# Patient Record
Sex: MALE | Race: WHITE | HISPANIC OR LATINO | Employment: OTHER | URBAN - METROPOLITAN AREA
[De-identification: names, ages, dates, MRNs, and addresses within clinical notes are randomized per-mention and may not be internally consistent; named-entity substitution may affect disease eponyms.]

---

## 2017-02-22 ENCOUNTER — PATIENT MESSAGE (OUTPATIENT)
Dept: INTERNAL MEDICINE | Facility: CLINIC | Age: 77
End: 2017-02-22

## 2017-02-24 ENCOUNTER — OFFICE VISIT (OUTPATIENT)
Dept: INTERNAL MEDICINE | Facility: CLINIC | Age: 77
End: 2017-02-24
Payer: MEDICARE

## 2017-02-24 VITALS
BODY MASS INDEX: 25.25 KG/M2 | DIASTOLIC BLOOD PRESSURE: 90 MMHG | SYSTOLIC BLOOD PRESSURE: 179 MMHG | HEIGHT: 64 IN | WEIGHT: 147.94 LBS | HEART RATE: 81 BPM

## 2017-02-24 DIAGNOSIS — B36.0 TINEA VERSICOLOR: Primary | ICD-10-CM

## 2017-02-24 DIAGNOSIS — R21 RASH: ICD-10-CM

## 2017-02-24 DIAGNOSIS — I10 ESSENTIAL HYPERTENSION: ICD-10-CM

## 2017-02-24 PROCEDURE — 1157F ADVNC CARE PLAN IN RCRD: CPT | Mod: S$GLB,,, | Performed by: INTERNAL MEDICINE

## 2017-02-24 PROCEDURE — 3080F DIAST BP >= 90 MM HG: CPT | Mod: S$GLB,,, | Performed by: INTERNAL MEDICINE

## 2017-02-24 PROCEDURE — 99213 OFFICE O/P EST LOW 20 MIN: CPT | Mod: S$GLB,,, | Performed by: INTERNAL MEDICINE

## 2017-02-24 PROCEDURE — 99999 PR PBB SHADOW E&M-EST. PATIENT-LVL III: CPT | Mod: PBBFAC,,, | Performed by: INTERNAL MEDICINE

## 2017-02-24 PROCEDURE — 1159F MED LIST DOCD IN RCRD: CPT | Mod: S$GLB,,, | Performed by: INTERNAL MEDICINE

## 2017-02-24 PROCEDURE — 99499 UNLISTED E&M SERVICE: CPT | Mod: S$GLB,,, | Performed by: INTERNAL MEDICINE

## 2017-02-24 PROCEDURE — 3077F SYST BP >= 140 MM HG: CPT | Mod: S$GLB,,, | Performed by: INTERNAL MEDICINE

## 2017-02-24 PROCEDURE — 1160F RVW MEDS BY RX/DR IN RCRD: CPT | Mod: S$GLB,,, | Performed by: INTERNAL MEDICINE

## 2017-02-24 PROCEDURE — 1126F AMNT PAIN NOTED NONE PRSNT: CPT | Mod: S$GLB,,, | Performed by: INTERNAL MEDICINE

## 2017-02-24 RX ORDER — KETOCONAZOLE 20 MG/ML
SHAMPOO, SUSPENSION TOPICAL
Qty: 120 ML | Refills: 0 | Status: SHIPPED | OUTPATIENT
Start: 2017-02-27 | End: 2017-05-27

## 2017-02-24 RX ORDER — KETOCONAZOLE 20 MG/G
CREAM TOPICAL DAILY
Qty: 60 G | Refills: 0 | Status: SHIPPED | OUTPATIENT
Start: 2017-02-24 | End: 2017-03-10

## 2017-02-24 NOTE — MR AVS SNAPSHOT
[facility]  [address]  [place] [address]  Phone: [phone]  Fax: [phone]                  Taran Hernandes   2017 2:20 PM   Office Visit    Description:  Male : 1940   Provider:  Malissa King MD   Department:  [facility] Internal Medicine           Reason for Visit     Rash           Diagnoses this Visit        Comments    Tinea versicolor    -  Primary     Rash         Essential hypertension                To Do List           Goals (5 Years of Data)     None      Follow-Up and Disposition     Return if symptoms worsen or fail to improve.       These Medications        Disp Refills Start End    ketoconazole (NIZORAL) 2 % shampoo 120 mL 0 2017     Apply topically twice a week. - Topical (Top)    Pharmacy: [facility] [address] Ph #: [phone]       ketoconazole (NIZORAL) 2 % cream 60 g 0 2017 3/10/2017    Apply topically once daily. - Topical (Top)    Pharmacy: [facility] [address] Ph #: [phone]         [facility]     [facility] On Call Nurse [facility] -  Assistance  Registered nurses in the [facility] provide clinical advisement, health education, appointment booking, and other advisory services.  Call for this free service at [phone].             Medications           Message regarding Medications     Verify the changes and/or additions to your medication regime listed below are the same as discussed with your clinician today.  If any of these changes or additions are incorrect, please notify your healthcare provider.        START taking these NEW medications        Refills    ketoconazole (NIZORAL) 2 % shampoo 0    Starting on: 2017    Sig: Apply topically twice a week.    Class: Normal    Route: Topical (Top)    ketoconazole (NIZORAL) 2  % cream 0    Sig: Apply topically once daily.    Class: Normal    Route: Topical (Top)      STOP taking these medications     ibuprofen (ADVIL) 200 MG tablet Take 200 mg by mouth every 6 (six) hours as needed for Pain.           Verify that the below list of medications is an accurate representation of the medications you are currently taking.  If none reported, the list may be blank. If incorrect, please contact your healthcare provider. Carry this list with you in case of emergency.           Current Medications     amlodipine (NORVASC) 10 MG tablet Take 1 tablet (10 mg total) by mouth once daily.    aspirin (ECOTRIN) 81 MG EC tablet Take 81 mg by mouth once daily.    benazepril (LOTENSIN) 20 MG tablet Take 1 tablet (20 mg total) by mouth once daily.    hydrocodone-acetaminophen 5-325mg (NORCO) 5-325 mg per tablet Take 1 tablet by mouth every 6 (six) hours as needed.    omeprazole (PRILOSEC) 20 MG capsule Take 1 capsule (20 mg total) by mouth once daily.    tamsulosin (FLOMAX) 0.4 mg Cp24 TAKE 1 CAPSULE(0.4 MG) BY MOUTH EVERY DAY    vitamin D 185 MG Tab Take by mouth. 1 Tablet Oral Every day    ketoconazole (NIZORAL) 2 % cream Apply topically once daily.    ketoconazole (NIZORAL) 2 % shampoo Starting on Feb 27, 2017. Apply topically twice a week.           Clinical Reference Information           Your Vitals Were     BP                   179/90 (BP Location: Left arm, Patient Position: Sitting, BP Method: Automatic)           Blood Pressure          Most Recent Value    BP  (!)  179/90      Allergies as of 2/24/2017     No Known Allergies      Immunizations Administered on Date of Encounter - 2/24/2017     None      Instructions    Dear Taran Hernandes,     Thank you for choosing [facility] for your healthcare needs.    Please use the anti-fungal cream and shampoo as we discussed.     Please take your blood pressure medications as prescribed, and avoid excess salt in your diet. Please keep a log of  your blood pressures, and bring the log and your blood pressure machine with you to your next appointment.     Please schedule an appointment with your primary care doctor for follow up of your blood pressure and routine health maintenance.     It was a pleasure taking care of you, and we wish you the best health!     Sincerely,     Malissa King MD  Hospital Medicine [facility]     .  Fungal Skin Infection (Tinea)  A fungal infection is when too much fungus grows on or in the body. Fungus normally lives on the skin in small amounts and does not cause harm. But when too much grows on the skin, it causes an infection. This is also known as tinea. Fungal skin infections are common and not often serious.  The infection often starts as a small red area the size of a pea. The skin may turn dry and flaky. The area may itch. As the fungus grows, it spreads out in a red [place]. Because of how it looks, fungal skin infection is often called ringworm, but it is not caused by a worm. Fungal skin infections can occur on many parts of the body. They can grow on the head, chest, arms, or legs. They can occur on the buttocks. On the feet, fungal infection is known as athletes foot. It causes itchy, sometimes painful sores between the toes and the bottom or sides of the feet. In the groin, the rash is called jock itch.  People with weakened immune systems can get a fungal infection more easily. This includes people with diabetes or HIV, or who are being treated for cancer. In these cases, the fungal infection can spread and cause severe illness. Fungal infections are also more common in people who are obese.  In most cases, treatment is done with antifungal cream or ointment. If the infection is on your scalp, you may take oral medication. In some cases, a tiny piece of the skin (biopsy) may be taken. This is so it can be tested in a lab.  Common fungal infections are treated with creams on the skin or oral  medicine.  Home care  Follow all instructions when using antifungal cream or ointment on your skin. The health care provider may advise using cornstarch powder to keep your skin dry or petroleum jelly to provide a barrier.  General care:  · If you were prescribed an oral medicine, read the patient information. Talk with the health care provider about the risks and side effects.  · Let your skin dry completely after bathing. Carefully dry your feet and between your toes.  · Dress in loose cotton clothing.  · Dont scratch the affected area. This can delay healing and may spread the infection. It can also cause a bacterial infection.  · Keep your skin clean, but dont wash the skin too much. This can irritate your skin.  · Keep in mind that it may take a week before the fungus starts to go away. It can take 2 to 4 weeks to fully clear. To prevent it from coming back, use the medicine until the rash is all gone.  Follow-up care  Follow up with your health care provider if the rash does not get better after 10 days of treatment. Also follow up if the rash spreads to other parts of your body.  When to seek medical advice  Call your health care provider right away if any of these occur:  · Fever of 100.4°F (38°C) or higher  · Redness or swelling that gets worse  · Pain that gets worse  · Foul-smelling fluid leaking from the skin  Date Last Reviewed: 7/23/2014  © 7356-1717 [employer]. [address] [place] [address]. All rights reserved. This information is not intended as a substitute for professional medical care. Always follow your healthcare professional's instructions.        Step-by-Step  Checking Your Blood Pressure    Date Last Reviewed: 4/27/2016  © 0277-0514 [employer]. [address] [place] [address]. All rights reserved. This information is not intended as a substitute for professional medical care. Always follow your healthcare professional's  instructions.             Language Assistance Services     ATTENTION: Language assistance services are available, free of charge. Please call [phone].      ATENCIÓN: Si habla everett, tiene a cano disposición servicios gratuitos de asistencia lingüística. Llame al [phone].     CHÚ Ý: N?u b?n nói Ti?ng Vi?t, có các d?ch v? h? tr? ngôn ng? mi?n phí dành cho b?n. G?i s? [phone].         [facility] complies with applicable Federal civil rights laws and does not discriminate on the basis of race, color, national origin, age, disability, or sex.

## 2017-02-24 NOTE — PATIENT INSTRUCTIONS
Dear Taran Hernandes,     Thank you for choosing [facility] for your healthcare needs.    Please use the anti-fungal cream and shampoo as we discussed.     Please take your blood pressure medications as prescribed, and avoid excess salt in your diet. Please keep a log of your blood pressures, and bring the log and your blood pressure machine with you to your next appointment.     Please schedule an appointment with your primary care doctor for follow up of your blood pressure and routine health maintenance.     It was a pleasure taking care of you, and we wish you the best health!     Sincerely,     Malissa King MD  Hospital Medicine  [facility]     .  Fungal Skin Infection (Tinea)  A fungal infection is when too much fungus grows on or in the body. Fungus normally lives on the skin in small amounts and does not cause harm. But when too much grows on the skin, it causes an infection. This is also known as tinea. Fungal skin infections are common and not often serious.  The infection often starts as a small red area the size of a pea. The skin may turn dry and flaky. The area may itch. As the fungus grows, it spreads out in a red [place]. Because of how it looks, fungal skin infection is often called ringworm, but it is not caused by a worm. Fungal skin infections can occur on many parts of the body. They can grow on the head, chest, arms, or legs. They can occur on the buttocks. On the feet, fungal infection is known as athletes foot. It causes itchy, sometimes painful sores between the toes and the bottom or sides of the feet. In the groin, the rash is called jock itch.  People with weakened immune systems can get a fungal infection more easily. This includes people with diabetes or HIV, or who are being treated for cancer. In these cases, the fungal infection can spread and cause severe illness. Fungal infections are also more common in people who are obese.  In most cases,  treatment is done with antifungal cream or ointment. If the infection is on your scalp, you may take oral medication. In some cases, a tiny piece of the skin (biopsy) may be taken. This is so it can be tested in a lab.  Common fungal infections are treated with creams on the skin or oral medicine.  Home care  Follow all instructions when using antifungal cream or ointment on your skin. The health care provider may advise using cornstarch powder to keep your skin dry or petroleum jelly to provide a barrier.  General care:  · If you were prescribed an oral medicine, read the patient information. Talk with the health care provider about the risks and side effects.  · Let your skin dry completely after bathing. Carefully dry your feet and between your toes.  · Dress in loose cotton clothing.  · Dont scratch the affected area. This can delay healing and may spread the infection. It can also cause a bacterial infection.  · Keep your skin clean, but dont wash the skin too much. This can irritate your skin.  · Keep in mind that it may take a week before the fungus starts to go away. It can take 2 to 4 weeks to fully clear. To prevent it from coming back, use the medicine until the rash is all gone.  Follow-up care  Follow up with your health care provider if the rash does not get better after 10 days of treatment. Also follow up if the rash spreads to other parts of your body.  When to seek medical advice  Call your health care provider right away if any of these occur:  · Fever of 100.4°F (38°C) or higher  · Redness or swelling that gets worse  · Pain that gets worse  · Foul-smelling fluid leaking from the skin  Date Last Reviewed: 7/23/2014  © 7548-4291 [employer]. [address] [place] [address], [place] [address]. All rights reserved. This information is not intended as a substitute for professional medical care. Always follow your healthcare professional's instructions.        Step-by-Step  Checking Your Blood  Pressure    Date Last Reviewed: 4/27/2016  © 4752-1456 [employer]. [address] [place] [address], [place] [address]. All rights reserved. This information is not intended as a substitute for professional medical care. Always follow your healthcare professional's instructions.

## 2017-02-24 NOTE — PROGRESS NOTES
[facility]  Reason for Visit: Rash (itchy on pt back)    HPI:   I had the pleasure to meet Taran Hernandes, who is a 76 y.o. male, with HTN on 2 antihypertensives, and BPH on tamsulosin, who came in for evaluation of Rash (itchy on pt back).     Rash has been going on for 2 months, located on his back, 0/10 pain, but pruritic. The rash began at his scalp, but it spread down from his neck to his back. His wife said he has a discoloration as well. Patient notes darkening on his neck and back as well. Flakiness improves with aveno and moisturizing lotions. Patient denies any change in his soaps and or shampoos, or laundry detergents. Denies h/o rash like this before.     Patient reports taking his BP medications in the AM, amlodipine 10mg daily, benazepril 20mg daily. Patient's home BP is usually under 140 systolic. Patient gets daily headaches in the evenings, and feels like it's when his BP is up. Patient eats out once weekly, he loves seafood, catfish, shrimp, and oysters, and notes that it's very salty. Denies CP, SOB. Palpitations, N/V, F/C.     ROS:    Review of Systems   Constitutional: Negative for fever and unexpected weight change.   HENT: Negative for hearing loss and sore throat.    Respiratory: Negative for shortness of breath and wheezing.    Cardiovascular: Negative for chest pain and palpitations.   Gastrointestinal: Negative for abdominal pain, blood in stool, nausea and vomiting.   Genitourinary: Positive for frequency (3-4 times per night). Negative for dysuria and hematuria.   Skin: Positive for rash. Negative for pallor.   Neurological: Positive for headaches. Negative for dizziness and numbness.   Psychiatric/Behavioral: Negative for confusion and sleep disturbance.   All other systems reviewed and are negative.    PMH:     Past Medical History:   Diagnosis Date    BPH (benign prostatic hyperplasia)     DDD (degenerative disc disease), lumbar     Dyslipidemia     Fatty liver      GERD (gastroesophageal reflux disease)     Hematuria     History of colonic polyps     Hypertension     Lumbar disc disease     Mild vitamin D deficiency     Renal disorder      Past Surgical History:   Procedure Laterality Date    CATARACT EXTRACTION W/  INTRAOCULAR LENS IMPLANT Left     Dr Ruggiero     CATARACT EXTRACTION W/  INTRAOCULAR LENS IMPLANT Right 03/27/16    Dr Ruggiero    CHOLECYSTECTOMY      FRACTURE SURGERY      left ankle     Allergies:     Review of patient's allergies indicates:   Allergen Reactions    No known allergies      Medications:     Current Outpatient Prescriptions on File Prior to Visit   Medication Sig Dispense Refill    amlodipine (NORVASC) 10 MG tablet Take 1 tablet (10 mg total) by mouth once daily. 90 tablet 3    aspirin (ECOTRIN) 81 MG EC tablet Take 81 mg by mouth once daily.      benazepril (LOTENSIN) 20 MG tablet Take 1 tablet (20 mg total) by mouth once daily. 90 tablet 3    hydrocodone-acetaminophen 5-325mg (NORCO) 5-325 mg per tablet Take 1 tablet by mouth every 6 (six) hours as needed. 20 tablet 0    ibuprofen (ADVIL) 200 MG tablet Take 200 mg by mouth every 6 (six) hours as needed for Pain.      omeprazole (PRILOSEC) 20 MG capsule Take 1 capsule (20 mg total) by mouth once daily. 90 capsule 3    tamsulosin (FLOMAX) 0.4 mg Cp24 TAKE 1 CAPSULE(0.4 MG) BY MOUTH EVERY DAY 90 capsule 3    vitamin D 185 MG Tab Take by mouth. 1 Tablet Oral Every day       No current facility-administered medications on file prior to visit.      Medications have been reviewed and reconciled.    Social History:     Social History   Substance Use Topics    Smoking status: Never Smoker    Smokeless tobacco: Not on file    Alcohol use Yes      Comment: A couple of beers a week   Patient works as an .   Family History:     Family History   Problem Relation Age of Onset    Cancer Mother      complications of c-scope/ suspect colon cancer    Hypertension Father     Diabetes  "Father     Heart disease Father     Cancer Brother      liver/ etoh    Hypertension Brother     Melanoma Neg Hx      Physical Exam:   BP (!) 179/90 (BP Location: Left arm, Patient Position: Sitting, BP Method: Automatic)  Pulse 81  Ht 5' 4" (1.626 m)  Wt 67.1 kg (147 lb 14.9 oz)  BMI 25.39 kg/m2   Body mass index is 25.39 kg/(m^2).    Repeat /82    Physical Exam   Constitutional: He is oriented to person, place, and time. He appears well-developed. No distress.   HENT:   Head: Normocephalic and atraumatic.   Eyes: EOM are normal.   Neck: Neck supple. No JVD present. No thyromegaly present.   Cardiovascular: Normal rate, regular rhythm and normal heart sounds.  Exam reveals no gallop and no friction rub.    No murmur heard.  Pulmonary/Chest: Breath sounds normal. No respiratory distress. He has no wheezes. He has no rales.   Abdominal: Soft. Bowel sounds are normal. There is no tenderness. There is no rebound and no guarding.   Musculoskeletal: He exhibits no edema.   Neurological: He is alert and oriented to person, place, and time.   Skin: Skin is warm and dry. Rash noted. Rash is macular and papular.   Hyperpigmented and erythematous macules on the posterior neck extending from the scalp, also noted on the upper third of the back, NTTP (see pictures with measurements).   Vitals reviewed.                        Labs:     Lab Results   Component Value Date     08/29/2016    K 4.0 08/29/2016     08/29/2016    CO2 23 08/29/2016    BUN 9 08/29/2016    CREATININE 1.2 08/29/2016    ANIONGAP 8 08/29/2016     Lab Results   Component Value Date    HGBA1C 5.9 07/11/2013    Lab Results   Component Value Date    WBC 6.76 08/29/2016    HGB 13.9 (L) 08/29/2016    HCT 40.0 08/29/2016     08/29/2016    GRAN 3.7 08/29/2016    GRAN 55.1 08/29/2016     Lab Results   Component Value Date    CHOL 171 07/20/2016    HDL 32 (L) 07/20/2016    LDLCALC 117.4 07/20/2016    TRIG 67 08/27/2016      "     Assessment:       1. Tinea versicolor    2. Rash    3. Essential hypertension        Plan:   Tinea versicolor  Rash  Patient has a superficial fungal infection. Will do empiric trial of topical antifungals, and follow up response.    ketoconazole (NIZORAL) 2 % shampoo; Apply topically twice a week.  Dispense: 120 mL; Refill: 0   ketoconazole (NIZORAL) 2 % cream; Apply topically once daily.  Dispense: 60 g; Refill: 0   Patient advised that changes in cutaneous pigment often persist after successful treatment. Restoration of normal pigmentation may take months after the completion of successful therapy.    If rash does not improve, can refer patient to Derm for JUSTO prep and further evaluation.     Essential hypertension  Initial /90, then after rest was 140/82 which is similar to his reported home BP's and within tolerable range.   Continue home antihypertensives as prescribed   Counseled patient on high blood pressure and lifestyle modifications that can help, like avoiding salty foods and excess salt, patient understood   Avoid NSAIDS   Patient to keep BP log and bring it and his BP machine with him to his PCP f/u    Recommend PCP F/U in 2-4 weeks for repeat BP check and routine health maintenance.    It was a pleasure seeing Taran Hernandes in clinic today.     Signed:    Malissa King MD  Internal Medicine  2/24/2017 2:45 PM

## 2017-03-03 ENCOUNTER — PATIENT MESSAGE (OUTPATIENT)
Dept: INTERNAL MEDICINE | Facility: CLINIC | Age: 77
End: 2017-03-03

## 2017-03-09 ENCOUNTER — OFFICE VISIT (OUTPATIENT)
Dept: INTERNAL MEDICINE | Facility: CLINIC | Age: 77
End: 2017-03-09
Payer: MEDICARE

## 2017-03-09 ENCOUNTER — PATIENT MESSAGE (OUTPATIENT)
Dept: INTERNAL MEDICINE | Facility: CLINIC | Age: 77
End: 2017-03-09

## 2017-03-09 VITALS
HEART RATE: 77 BPM | BODY MASS INDEX: 30.26 KG/M2 | SYSTOLIC BLOOD PRESSURE: 134 MMHG | DIASTOLIC BLOOD PRESSURE: 78 MMHG | WEIGHT: 177.25 LBS | HEIGHT: 64 IN

## 2017-03-09 DIAGNOSIS — R73.09 ELEVATED RANDOM BLOOD GLUCOSE LEVEL: ICD-10-CM

## 2017-03-09 DIAGNOSIS — N40.1 BPH WITH URINARY OBSTRUCTION: ICD-10-CM

## 2017-03-09 DIAGNOSIS — R10.13 EPIGASTRIC ABDOMINAL PAIN: ICD-10-CM

## 2017-03-09 DIAGNOSIS — N13.8 BPH WITH URINARY OBSTRUCTION: ICD-10-CM

## 2017-03-09 DIAGNOSIS — N18.2 CHRONIC KIDNEY DISEASE, STAGE II (MILD): ICD-10-CM

## 2017-03-09 DIAGNOSIS — I10 ESSENTIAL HYPERTENSION: Primary | ICD-10-CM

## 2017-03-09 LAB
BILIRUB UR QL STRIP: NEGATIVE
CLARITY UR REFRACT.AUTO: CLEAR
COLOR UR AUTO: YELLOW
GLUCOSE UR QL STRIP: NEGATIVE
HGB UR QL STRIP: NEGATIVE
KETONES UR QL STRIP: NEGATIVE
LEUKOCYTE ESTERASE UR QL STRIP: NEGATIVE
NITRITE UR QL STRIP: NEGATIVE
PH UR STRIP: 6 [PH] (ref 5–8)
PROT UR QL STRIP: ABNORMAL
SP GR UR STRIP: 1.02 (ref 1–1.03)
URN SPEC COLLECT METH UR: ABNORMAL
UROBILINOGEN UR STRIP-ACNC: NEGATIVE EU/DL

## 2017-03-09 PROCEDURE — 1157F ADVNC CARE PLAN IN RCRD: CPT | Mod: S$GLB,,, | Performed by: INTERNAL MEDICINE

## 2017-03-09 PROCEDURE — 1159F MED LIST DOCD IN RCRD: CPT | Mod: S$GLB,,, | Performed by: INTERNAL MEDICINE

## 2017-03-09 PROCEDURE — 99214 OFFICE O/P EST MOD 30 MIN: CPT | Mod: S$GLB,,, | Performed by: INTERNAL MEDICINE

## 2017-03-09 PROCEDURE — 99999 PR PBB SHADOW E&M-EST. PATIENT-LVL III: CPT | Mod: PBBFAC,,, | Performed by: INTERNAL MEDICINE

## 2017-03-09 PROCEDURE — 99499 UNLISTED E&M SERVICE: CPT | Mod: S$GLB,,, | Performed by: INTERNAL MEDICINE

## 2017-03-09 PROCEDURE — 1125F AMNT PAIN NOTED PAIN PRSNT: CPT | Mod: S$GLB,,, | Performed by: INTERNAL MEDICINE

## 2017-03-09 PROCEDURE — 3075F SYST BP GE 130 - 139MM HG: CPT | Mod: S$GLB,,, | Performed by: INTERNAL MEDICINE

## 2017-03-09 PROCEDURE — 1160F RVW MEDS BY RX/DR IN RCRD: CPT | Mod: S$GLB,,, | Performed by: INTERNAL MEDICINE

## 2017-03-09 PROCEDURE — 81003 URINALYSIS AUTO W/O SCOPE: CPT

## 2017-03-09 PROCEDURE — 3078F DIAST BP <80 MM HG: CPT | Mod: S$GLB,,, | Performed by: INTERNAL MEDICINE

## 2017-03-09 RX ORDER — LOSARTAN POTASSIUM 50 MG/1
50 TABLET ORAL DAILY
Qty: 30 TABLET | Refills: 3 | Status: SHIPPED | OUTPATIENT
Start: 2017-03-09 | End: 2017-06-06 | Stop reason: SDUPTHER

## 2017-03-09 NOTE — MR AVS SNAPSHOT
[facility]  [address]  [facility] [address]  Phone: [phone]  Fax: [phone]                  Taran Hernandes   3/9/2017 11:30 AM   Office Visit    Description:  Male : 1940   Provider:  Rosita Rios MD   Department:  [facility]           Reason for Visit     Hypertension           Diagnoses this Visit        Comments    Essential hypertension    -  Primary     Chronic kidney disease, stage II (mild)         BPH with urinary obstruction         Epigastric abdominal pain         Elevated random blood glucose level                To Do List           Future Appointments        Provider Department Dept Phone    3/9/2017 12:20 PM LAB, APPOINTMENT [facility] [phone]      Goals (5 Years of Data)     None       These Medications        Disp Refills Start End    losartan (COZAAR) 50 MG tablet 30 tablet 3 3/9/2017     Take 1 tablet (50 mg total) by mouth once daily. - Oral    Pharmacy: [facility] [address] [place] [address] [address] AT [facility] [address] Ph #: [phone]       Notes to Pharmacy: Stop Benazepril.      [facility]     [facility] On [facility] Nurse Care Line -  Assistance  Registered nurses in the [facility] Center provide clinical advisement, health education, appointment booking, and other advisory services.  Call for this free service at [phone].             Medications           Message regarding Medications     Verify the changes and/or additions to your medication regime listed below are the same as discussed with your clinician today.  If any of these changes or additions are incorrect, please notify your healthcare provider.        START taking these NEW medications        Refills    losartan (COZAAR) 50 MG tablet 3    Sig: Take 1 tablet (50 mg total) by mouth once daily.    Class: Normal    Route: Oral      STOP taking these medications   "   benazepril (LOTENSIN) 20 MG tablet Take 1 tablet (20 mg total) by mouth once daily.           Verify that the below list of medications is an accurate representation of the medications you are currently taking.  If none reported, the list may be blank. If incorrect, please contact your healthcare provider. Carry this list with you in case of emergency.           Current Medications     amlodipine (NORVASC) 10 MG tablet Take 1 tablet (10 mg total) by mouth once daily.    aspirin (ECOTRIN) 81 MG EC tablet Take 81 mg by mouth once daily.    hydrocodone-acetaminophen 5-325mg (NORCO) 5-325 mg per tablet Take 1 tablet by mouth every 6 (six) hours as needed.    ketoconazole (NIZORAL) 2 % cream Apply topically once daily.    ketoconazole (NIZORAL) 2 % shampoo Apply topically twice a week.    omeprazole (PRILOSEC) 20 MG capsule Take 1 capsule (20 mg total) by mouth once daily.    tamsulosin (FLOMAX) 0.4 mg Cp24 TAKE 1 CAPSULE(0.4 MG) BY MOUTH EVERY DAY    vitamin D 185 MG Tab Take by mouth. 1 Tablet Oral Every day    losartan (COZAAR) 50 MG tablet Take 1 tablet (50 mg total) by mouth once daily.           Clinical Reference Information           Your Vitals Were     BP Pulse Height             134/78 77 5' 4" (1.626 m)         Blood Pressure          Most Recent Value    BP  134/78      Allergies as of 3/9/2017     No Known Allergies      Immunizations Administered on Date of Encounter - 3/9/2017     None      Orders Placed During Today's Visit      Normal Orders This Visit    Urinalysis     Future Labs/Procedures Expected by Expires    CBC auto differential  3/9/2017 3/9/2018    Comprehensive metabolic panel  3/9/2017 3/9/2018    Hemoglobin A1c  3/9/2017 3/9/2018    Lipase  3/9/2017 5/8/2018      Language Assistance Services     ATTENTION: Language assistance services are available, free of charge. Please call [phone].      ATENCIÓN: Si doritala everett, tiene a cano disposición servicios gratuitos de asistencia " lingüística. Dale al [phone].     DANNY Ý: N?u b?n nói Ti?ng Vi?t, có các d?ch v? h? tr? ngôn ng? mi?n phí dành cho b?n. G?i s? [phone].         [facility] complies with applicable Federal civil rights laws and does not discriminate on the basis of race, color, national origin, age, disability, or sex.

## 2017-03-09 NOTE — PROGRESS NOTES
"Subjective:       Patient ID: Taran Hernandes is a 76 y.o. male.    Chief Complaint: Hypertension (bp elevated)    HPI Comments: Patient known to me, last seen for annual exam 8 months ago. Returns urgently c/o elevated blood pressure. He was seen by another provider for a rash two weeks ago, BP was 170/90. Started monitoring daily at home, range is 140-150/70-80's. He has a bitemporal headache (no associated neurologic symptoms), and complains of "Flomax not working" because he has nocturia 4-5 times per night. Compliant with daily dosing of Amlodipine maxed at 10mg and Benazepril 20mg. Complains of a dry hacking cough, so Benazepril will not be increased. Also, still has mild mid-epigastric pain since an episode of Acute Idiopathic Pancreatitis in . Evaluated in the past six months with EGD and EUS revealing no peptic ulcer disease or malignancy. Labs have been stable aside from increased Lipase. Noted very slightly elevated Glucose in the 110's.     PMH:  Hypertension. Stress Echo negative , EF 60%.  Mild Renal Insufficiency.   Fatty Liver.  GERD.  Colon Polyp, adenomas.   Hematuria, recurrent UTI, BPH - Cystoscopy 3/10, TRUS with biopsy .  Mild Dyslipidemia.   Mild Lumbar DJD/Disc disease.   Diffuse Diverticulosis.  Acute Prostatitis with sepsis 2013.  Acute Pancreatitis Aug. '16.      Colonoscopy 7/15 one tubular adenoma, subepithelial lipoma in rectum. PSA 4.2 , followed by Dr. King/Urology. Eye exam . Flu shot . Tetanus . Pneumovax . Prevnar 6/15. Labs : TChol 171, , HDL 32, .     PSH: Cholecystectomy. Left Ankle Fracture. Bilateral Cataract Extraction.    Social: Never smoked, No alcohol. , 6 children all living, 5 local.  - plans to retire soon and would like to move back to [place].     FMH: Father  age 76 with HTN, DM, Heart disease. Mother  age 82 complications of C-scope, suspected to have Colon cancer. " "Half brother had osteosarcoma. Half brother had liver cancer, heavy EtOH. Half brother  of a stroke noncompliant with HTN management.     NKDA. No food allergies.     MEDICATIONS: list reviewed and reconciled.           Review of Systems   Constitutional: Negative for chills, diaphoresis and fever.        Mild weight gain 5-6 pounds in past few weeks.   Respiratory: Negative for chest tightness, shortness of breath and wheezing.    Cardiovascular: Negative for chest pain, palpitations and leg swelling.   Gastrointestinal: Positive for abdominal pain. Negative for blood in stool, diarrhea, nausea and vomiting.   Genitourinary: Positive for frequency. Negative for dysuria and hematuria.   Neurological: Negative for dizziness, seizures, syncope, facial asymmetry, speech difficulty and weakness.       Objective:    /78, Pulse 77, Ht 5' 4", Wt 177 lbs (from 170)  Physical Exam   Constitutional: He is oriented to person, place, and time. He appears well-developed and well-nourished. No distress.   HENT:   Nose: Nose normal.   Mouth/Throat: Oropharynx is clear and moist.   Eyes: Conjunctivae are normal. No scleral icterus.   Cardiovascular: Normal rate, regular rhythm and normal heart sounds.    Pulmonary/Chest: Effort normal and breath sounds normal. No respiratory distress. He has no wheezes. He has no rales.   Musculoskeletal: Normal range of motion. He exhibits no edema.   Neurological: He is alert and oriented to person, place, and time. No cranial nerve deficit. Coordination normal.   Skin: Skin is warm and dry. He is not diaphoretic.   Psychiatric: He has a normal mood and affect. His behavior is normal. Thought content normal.       Assessment:       1. Essential hypertension    2. Chronic kidney disease, stage II (mild)    3. BPH with urinary obstruction    4. Epigastric abdominal pain    5. Elevated random blood glucose level        Plan:       Essential hypertension  -     Change Benazepril to Losartan " (COZAAR) 50 MG tablet; Take 1 tablet (50 mg total) by mouth once daily.  Dispense: 30 tablet; Refill: 3  -     Continue Amlodipine the same.     Chronic kidney disease, stage II (mild)  -     Urinalysis  -     Avoid NSAIDS (uses Ibuprofen for back pain - recommended Tylenol).    BPH with urinary obstruction        -     Continue Flomax. If no other cause for nocturia identified, may need f/u with Urology.    Epigastric abdominal pain  -     CBC auto differential; Future; Expected date: 3/9/17  -     Comprehensive metabolic panel; Future; Expected date: 3/9/17  -     Lipase; Future; Expected date: 3/9/17  -     Currently on daily Omeprazole which I would have him avoid if evidence of progressive renal disease.     Elevated random blood glucose level  -     Hemoglobin A1c; Future; Expected date: 3/9/17

## 2017-05-02 ENCOUNTER — TELEPHONE (OUTPATIENT)
Dept: ORTHOPEDICS | Facility: CLINIC | Age: 77
End: 2017-05-02

## 2017-05-02 ENCOUNTER — OFFICE VISIT (OUTPATIENT)
Dept: INTERNAL MEDICINE | Facility: CLINIC | Age: 77
End: 2017-05-02
Payer: MEDICARE

## 2017-05-02 ENCOUNTER — TELEPHONE (OUTPATIENT)
Dept: INTERNAL MEDICINE | Facility: CLINIC | Age: 77
End: 2017-05-02

## 2017-05-02 VITALS
BODY MASS INDEX: 30.45 KG/M2 | HEIGHT: 64 IN | HEART RATE: 60 BPM | SYSTOLIC BLOOD PRESSURE: 140 MMHG | WEIGHT: 178.38 LBS | DIASTOLIC BLOOD PRESSURE: 80 MMHG

## 2017-05-02 DIAGNOSIS — N18.30 CKD (CHRONIC KIDNEY DISEASE), STAGE III: ICD-10-CM

## 2017-05-02 DIAGNOSIS — Z00.00 ENCOUNTER FOR PREVENTIVE HEALTH EXAMINATION: ICD-10-CM

## 2017-05-02 DIAGNOSIS — N40.1 BPH WITH URINARY OBSTRUCTION: ICD-10-CM

## 2017-05-02 DIAGNOSIS — R97.20 ELEVATED PROSTATE SPECIFIC ANTIGEN (PSA): ICD-10-CM

## 2017-05-02 DIAGNOSIS — E78.5 HYPERLIPIDEMIA, UNSPECIFIED HYPERLIPIDEMIA TYPE: ICD-10-CM

## 2017-05-02 DIAGNOSIS — N13.8 BPH WITH URINARY OBSTRUCTION: ICD-10-CM

## 2017-05-02 DIAGNOSIS — M51.36 DDD (DEGENERATIVE DISC DISEASE), LUMBAR: Primary | ICD-10-CM

## 2017-05-02 DIAGNOSIS — M54.50 LUMBAR SPINE PAIN: Primary | ICD-10-CM

## 2017-05-02 DIAGNOSIS — I10 ESSENTIAL HYPERTENSION: ICD-10-CM

## 2017-05-02 DIAGNOSIS — Z86.010 HISTORY OF ADENOMATOUS POLYP OF COLON: ICD-10-CM

## 2017-05-02 PROBLEM — B36.0 TINEA VERSICOLOR: Status: RESOLVED | Noted: 2017-02-24 | Resolved: 2017-05-02

## 2017-05-02 PROCEDURE — 99999 PR PBB SHADOW E&M-EST. PATIENT-LVL IV: CPT | Mod: PBBFAC,,, | Performed by: NURSE PRACTITIONER

## 2017-05-02 PROCEDURE — 99499 UNLISTED E&M SERVICE: CPT | Mod: S$GLB,,, | Performed by: NURSE PRACTITIONER

## 2017-05-02 PROCEDURE — 3079F DIAST BP 80-89 MM HG: CPT | Mod: S$GLB,,, | Performed by: NURSE PRACTITIONER

## 2017-05-02 PROCEDURE — G0439 PPPS, SUBSEQ VISIT: HCPCS | Mod: S$GLB,,, | Performed by: NURSE PRACTITIONER

## 2017-05-02 PROCEDURE — 3077F SYST BP >= 140 MM HG: CPT | Mod: S$GLB,,, | Performed by: NURSE PRACTITIONER

## 2017-05-02 NOTE — TELEPHONE ENCOUNTER
Spoke to pt regarding appt Wednesday 5/3/17 at 330p with Erin Rabago PA-C. Pt was informed to arrive on the 2nd floor at 3p, then up to floor 5 to see Erin. Pt verbalized understanding. Phone number was provided for any further questions.    Kayy BOWMAN

## 2017-05-02 NOTE — PATIENT INSTRUCTIONS
Counseling and Referral of Other Preventative  (Italic type indicates deductible and co-insurance are waived)    Patient Name: Taran Hernandes  Today's Date: 5/2/2017      SERVICE LIMITATIONS RECOMMENDATION    Vaccines    · Pneumococcal (once after 65)    · Influenza (annually)    · Hepatitis B (if medium/high risk)    · Prevnar 13      Hepatitis B medium/high risk factors:       - End-stage renal disease       - Hemophiliacs who received Factor VII or         IX concentrates       - Clients of institutions for the mentally             retarded       - Persons who live in the same house as          a HepB carrier       - Homosexual men       - Illicit injectable drug abusers     Pneumococcal: Scheduled - see appointments     Influenza: Done, repeat in one year     Hepatitis B: N/A     Prevnar 13: Done, no repeat necessary    Prostate cancer screening (annually to age 75)     Prostate specific antigen (PSA) Shared decision making with Provider. Sometimes a co-pay may be required if the patient decides to have this test. The USPSTF no longer recommends prostate cancer screening routinely in medicine: not to follow    Colorectal cancer screening (to age 75)    · Fecal occult blood test (annual)  · Flexible sigmoidoscopy (5y)  · Screening colonoscopy (10y)  · Barium enema   Last done 2015, recommend to repeat every 5  years    Diabetes self-management training (no USPSTF recommendations)  Requires referral by treating physician for patient with diabetes or renal disease. 10 hours of initial DSMT sessions of no less than 30 minutes each in a continuous 12-month period. 2 hours of follow-up DSMT in subsequent years.  N/A    Glaucoma screening (no USPSTF recommendation)  Diabetes mellitus, family history   , age 50 or over    American, age 65 or over  Done this year, repeat every year    Medical nutrition therapy for diabetes or renal disease (no recommended schedule)  Requires referral by  treating physician for patient with diabetes or renal disease or kidney transplant within the past 3 years.  Can be provided in same year as diabetes self-management training (DSMT), and CMS recommends medical nutrition therapy take place after DSMT. Up to 3 hours for initial year and 2 hours in subsequent years.  Done this year, repeat every year    Cardiovascular screening blood tests (every 5 years)  · Fasting lipid panel  Order as a panel if possible  Done this year, repeat every year    Diabetes screening tests (at least every 3 years, Medicare covers annually or at 6-month intervals for prediabetic patients)  · Fasting blood sugar (FBS) or glucose tolerance test (GTT)  Patient must be diagnosed with one of the following:       - Hypertension       - Dyslipidemia       - Obesity (BMI 30kg/m2)       - Previous elevated impaired FBS or GTT       ... or any two of the following:       - Overweight (BMI 25 but <30)       - Family history of diabetes       - Age 65 or older       - History of gestational diabetes or birth of baby weighing more than 9 pounds  Done this year, repeat every year    Abdominal aortic aneurysm screening (once)  · Sonogram   Limited to patients who meet one of the following criteria:       - Men who are 65-75 years old and have smoked more than 100 cigarette in their lifetime       - Anyone with a family history of abdominal aortic aneurysm       - Anyone recommended for screening by the USPSTF  N/A    HIV screening (annually for increased risk patients)  · HIV-1 and HIV-2 by EIA, or JUWAN, rapid antibody test or oral mucosa transudate  Patients must be at increased risk for HIV infection per USPSTF guidelines or pregnant. Tests covered annually for patient at increased risk or as requested by the patient. Pregnant patients may receive up to 3 tests during pregnancy.  Risks discussed, screening is not recommended    Smoking cessation counseling (up to 8 sessions per year)  Patients must be  asymptomatic of tobacco-related conditions to receive as a preventative service.  Non-smoker    Subsequent annual wellness visit  At least 12 months since last AWV  Return in one year     The following information is provided to all patients.  This information is to help you find resources for any of the problems found today that may be affecting your health:                Living healthy guide: www.[facility].[place].[facility]      Understanding Diabetes: www.diabetes.org      Eating healthy: www.cdc.gov/healthyweight      CDC home safety checklist: www.cdc.gov/steadi/patient.html      Agency on Aging: www.goea.[place].[facility]      Alcoholics anonymous (AA): www.aa.org      Physical Activity: www.christina.nih.gov/ur7spqs      Tobacco use: www.quitwithusla.org     Counseling and Referral of Other Preventative  (Italic type indicates deductible and co-insurance are waived)    Patient Name: Taran Hernandes  Today's Date: 5/2/2017      SERVICE LIMITATIONS RECOMMENDATION    Vaccines    · Pneumococcal (once after 65)    · Influenza (annually)    · Hepatitis B (if medium/high risk)    · Prevnar 13      Hepatitis B medium/high risk factors:       - End-stage renal disease       - Hemophiliacs who received Factor VII or         IX concentrates       - Clients of institutions for the mentally             retarded       - Persons who live in the same house as          a HepB carrier       - Homosexual men       - Illicit injectable drug abusers     Pneumococcal: Done, no repeat necessary     Influenza: Done, repeat in one year     Hepatitis B: N/A     Prevnar 13: Done, no repeat necessary    Prostate cancer screening (annually to age 75)     Prostate specific antigen (PSA) Shared decision making with Provider. Sometimes a co-pay may be required if the patient decides to have this test. The USPSTF no longer recommends prostate cancer screening routinely in medicine: not to follow    Colorectal cancer screening (to age 75)    · Fecal occult  blood test (annual)  · Flexible sigmoidoscopy (5y)  · Screening colonoscopy (10y)  · Barium enema   Last done 2015, recommend to repeat every 5  years    Diabetes self-management training (no USPSTF recommendations)  Requires referral by treating physician for patient with diabetes or renal disease. 10 hours of initial DSMT sessions of no less than 30 minutes each in a continuous 12-month period. 2 hours of follow-up DSMT in subsequent years.  N/A    Glaucoma screening (no USPSTF recommendation)  Diabetes mellitus, family history   , age 50 or over    American, age 65 or over  Done this year, repeat every year    Medical nutrition therapy for diabetes or renal disease (no recommended schedule)  Requires referral by treating physician for patient with diabetes or renal disease or kidney transplant within the past 3 years.  Can be provided in same year as diabetes self-management training (DSMT), and CMS recommends medical nutrition therapy take place after DSMT. Up to 3 hours for initial year and 2 hours in subsequent years.  Done this year, repeat every year    Cardiovascular screening blood tests (every 5 years)  · Fasting lipid panel  Order as a panel if possible  Done this year, repeat every year    Diabetes screening tests (at least every 3 years, Medicare covers annually or at 6-month intervals for prediabetic patients)  · Fasting blood sugar (FBS) or glucose tolerance test (GTT)  Patient must be diagnosed with one of the following:       - Hypertension       - Dyslipidemia       - Obesity (BMI 30kg/m2)       - Previous elevated impaired FBS or GTT       ... or any two of the following:       - Overweight (BMI 25 but <30)       - Family history of diabetes       - Age 65 or older       - History of gestational diabetes or birth of baby weighing more than 9 pounds  Done this year, repeat every year    Abdominal aortic aneurysm screening (once)  · Sonogram   Limited to patients who meet  one of the following criteria:       - Men who are 65-75 years old and have smoked more than 100 cigarette in their lifetime       - Anyone with a family history of abdominal aortic aneurysm       - Anyone recommended for screening by the USPSTF  N/A    HIV screening (annually for increased risk patients)  · HIV-1 and HIV-2 by EIA, or JUWAN, rapid antibody test or oral mucosa transudate  Patients must be at increased risk for HIV infection per USPSTF guidelines or pregnant. Tests covered annually for patient at increased risk or as requested by the patient. Pregnant patients may receive up to 3 tests during pregnancy.  Risks discussed, screening is not recommended    Smoking cessation counseling (up to 8 sessions per year)  Patients must be asymptomatic of tobacco-related conditions to receive as a preventative service.  Non-smoker    Subsequent annual wellness visit  At least 12 months since last AWV  Return in one year     The following information is provided to all patients.  This information is to help you find resources for any of the problems found today that may be affecting your health:                Living healthy guide: www.[facility].[place].[facility]      Understanding Diabetes: www.diabetes.org      Eating healthy: www.cdc.gov/healthyweight      CDC home safety checklist: www.cdc.gov/steadi/patient.html      Agency on Aging: www.goea.[place].[facility]      Alcoholics anonymous (AA): www.aa.org      Physical Activity: www.christina.nih.gov/qg8zwxa      Tobacco use: www.quitwithusla.org

## 2017-05-02 NOTE — MR AVS SNAPSHOT
[facility]  [address]  [place] [address]  Phone: [phone]  Fax: [phone]                  Taran Hernandes   2017 10:00 AM   Office Visit    Description:  Male : 1940   Provider:  ERLINDA GARZON 6   Department:  [facility]           Reason for Visit     Health Risk Assessment           Diagnoses this Visit        Comments    DDD (degenerative disc disease), lumbar    -  Primary            To Do List           Goals (5 Years of Data)     None      [facility]     [facility] On Call Nurse Care Line -  Assistance  Unless otherwise directed by your provider, please contact [facility], our nurse care line that is available for  assistance.     Registered nurses in the [facility] Call Center provide: appointment scheduling, clinical advisement, health education, and other advisory services.  Call: [phone] (toll free)               Medications           Message regarding Medications     Verify the changes and/or additions to your medication regime listed below are the same as discussed with your clinician today.  If any of these changes or additions are incorrect, please notify your healthcare provider.             Verify that the below list of medications is an accurate representation of the medications you are currently taking.  If none reported, the list may be blank. If incorrect, please contact your healthcare provider. Carry this list with you in case of emergency.           Current Medications     amlodipine (NORVASC) 10 MG tablet Take 1 tablet (10 mg total) by mouth once daily.    aspirin (ECOTRIN) 81 MG EC tablet Take 81 mg by mouth once daily.    losartan (COZAAR) 50 MG tablet Take 1 tablet (50 mg total) by mouth once daily.    omeprazole (PRILOSEC) 20 MG capsule Take 1 capsule (20 mg total) by mouth once daily.    tamsulosin (FLOMAX) 0.4 mg Cp24 TAKE 1 CAPSULE(0.4 MG) BY MOUTH EVERY DAY    vitamin D 185 MG Tab Take by  "mouth. 1 Tablet Oral Every day    ketoconazole (NIZORAL) 2 % shampoo Apply topically twice a week.           Clinical Reference Information           Your Vitals Were     BP Pulse Height Weight BMI    140/80 60 5' 4" (1.626 m) 80.9 kg (178 lb 5.6 oz) 30.61 kg/m2      Blood Pressure          Most Recent Value    BP  (!)  140/80      Allergies as of 5/2/2017     No Known Allergies      Immunizations Administered on Date of Encounter - 5/2/2017     None      Orders Placed During Today's Visit      Normal Orders This Visit    Ambulatory Referral to Orthopedics       Language Assistance Services     ATTENTION: Language assistance services are available, free of charge. Please call [phone].      ATENCIÓN: Si kenyon everett, tiene a cano disposición servicios gratuitos de asistencia lingüística. Llame al [phone].     DANNY Ý: N?u b?n nói Ti?ng Vi?t, có các d?ch v? h? tr? ngôn ng? mi?n phí dành cho b?n. G?i s? [phone].         [facility] complies with applicable Federal civil rights laws and does not discriminate on the basis of race, color, national origin, age, disability, or sex.        "

## 2017-05-03 ENCOUNTER — HOSPITAL ENCOUNTER (OUTPATIENT)
Dept: RADIOLOGY | Facility: HOSPITAL | Age: 77
Discharge: HOME | End: 2017-05-03
Attending: ORTHOPAEDIC SURGERY
Payer: MEDICARE

## 2017-05-03 ENCOUNTER — OFFICE VISIT (OUTPATIENT)
Dept: ORTHOPEDICS | Facility: CLINIC | Age: 77
End: 2017-05-03
Payer: MEDICARE

## 2017-05-03 VITALS
DIASTOLIC BLOOD PRESSURE: 83 MMHG | WEIGHT: 180.44 LBS | BODY MASS INDEX: 30.81 KG/M2 | HEIGHT: 64 IN | HEART RATE: 86 BPM | SYSTOLIC BLOOD PRESSURE: 142 MMHG

## 2017-05-03 DIAGNOSIS — M54.16 LUMBAR RADICULOPATHY: Primary | ICD-10-CM

## 2017-05-03 DIAGNOSIS — M54.50 LUMBAR SPINE PAIN: ICD-10-CM

## 2017-05-03 PROCEDURE — 1125F AMNT PAIN NOTED PAIN PRSNT: CPT | Mod: S$GLB,,, | Performed by: PHYSICIAN ASSISTANT

## 2017-05-03 PROCEDURE — 1160F RVW MEDS BY RX/DR IN RCRD: CPT | Mod: S$GLB,,, | Performed by: PHYSICIAN ASSISTANT

## 2017-05-03 PROCEDURE — 1159F MED LIST DOCD IN RCRD: CPT | Mod: S$GLB,,, | Performed by: PHYSICIAN ASSISTANT

## 2017-05-03 PROCEDURE — 99204 OFFICE O/P NEW MOD 45 MIN: CPT | Mod: S$GLB,,, | Performed by: PHYSICIAN ASSISTANT

## 2017-05-03 PROCEDURE — 99999 PR PBB SHADOW E&M-EST. PATIENT-LVL III: CPT | Mod: PBBFAC,,, | Performed by: PHYSICIAN ASSISTANT

## 2017-05-03 PROCEDURE — 72120 X-RAY BEND ONLY L-S SPINE: CPT | Mod: 26,,, | Performed by: RADIOLOGY

## 2017-05-03 PROCEDURE — 72100 X-RAY EXAM L-S SPINE 2/3 VWS: CPT | Mod: 26,,, | Performed by: RADIOLOGY

## 2017-05-03 PROCEDURE — 3077F SYST BP >= 140 MM HG: CPT | Mod: S$GLB,,, | Performed by: PHYSICIAN ASSISTANT

## 2017-05-03 PROCEDURE — 3079F DIAST BP 80-89 MM HG: CPT | Mod: S$GLB,,, | Performed by: PHYSICIAN ASSISTANT

## 2017-05-03 RX ORDER — METHYLPREDNISOLONE 4 MG/1
TABLET ORAL
Qty: 1 PACKAGE | Refills: 0 | Status: SHIPPED | OUTPATIENT
Start: 2017-05-03 | End: 2017-05-27

## 2017-05-03 NOTE — LETTER
May 3, 2017      Janel Robles, FNP  [address]  [place] [address]           [facility]  [address]  [place] [address]  Phone: [phone]          Patient: Taran Hernandes   MR Number: [id number]   YOB: 1940   Date of Visit: 5/3/2017       Dear Janel Robles:    Thank you for referring Taran Hernandes to me for evaluation. Attached you will find relevant portions of my assessment and plan of care.    If you have questions, please do not hesitate to call me. I look forward to following Taran Hernandes along with you.    Sincerely,    Erin Rabago PA-C    Enclosure  CC:  No Recipients    If you would like to receive this communication electronically, please contact [email or web address] or [phone] to request more information on [employer] access.    For providers and/or their staff who would like to refer a patient to [facility], please contact us through our one-stop-shop provider referral line, [facility] , at [phone].    If you feel you have received this communication in error or would no longer like to receive these types of communications, please e-mail [email or web address]

## 2017-05-03 NOTE — PROGRESS NOTES
DATE: 5/3/2017  PATIENT: Taran Hernandes    Supervising Physician: Torsten Vo M.D.    CHIEF COMPLAINT: back and right leg pain    HISTORY:  Taran Hernandes is a 76 y.o. male here for initial evaluation of low back and right lateral leg pain (Back - 6, Leg - 8).  The pain in the leg is what bothers him most.  The pain has been present for 2 years but has progressively worsened over the last few months. The patient describes the pain as aching and numbness/tingling.  The pain is worse with walking and improved by sitting or leaning over a shopping cart. There is associated numbness and tingling. There is no subjective weakness. Prior treatments have included ibuprofen and BC poweder, but no ESIs or surgery.    The patient denies myelopathic symptoms such as handwriting changes or difficulty with buttons/coins/keys. Denies perineal paresthesias, bowel/bladder dysfunction.    PAST MEDICAL/SURGICAL HISTORY:  Past Medical History:   Diagnosis Date    BPH (benign prostatic hyperplasia)     DDD (degenerative disc disease), lumbar     Dyslipidemia     Fatty liver     GERD (gastroesophageal reflux disease)     Hematuria     History of colonic polyps     Hypertension     Lumbar disc disease     Mild vitamin D deficiency     Renal disorder      Past Surgical History:   Procedure Laterality Date    CATARACT EXTRACTION W/  INTRAOCULAR LENS IMPLANT Left     Dr Ruggiero     CATARACT EXTRACTION W/  INTRAOCULAR LENS IMPLANT Right 03/27/16    Dr Ruggiero    CHOLECYSTECTOMY      FRACTURE SURGERY      left ankle    NEUROMA SURGERY Right 2006       Medications:   Current Outpatient Prescriptions on File Prior to Visit   Medication Sig Dispense Refill    amlodipine (NORVASC) 10 MG tablet Take 1 tablet (10 mg total) by mouth once daily. 90 tablet 3    aspirin (ECOTRIN) 81 MG EC tablet Take 81 mg by mouth once daily.      losartan (COZAAR) 50 MG tablet Take 1 tablet (50 mg total) by mouth once daily. 30 tablet 3     "omeprazole (PRILOSEC) 20 MG capsule Take 1 capsule (20 mg total) by mouth once daily. 90 capsule 3    tamsulosin (FLOMAX) 0.4 mg Cp24 TAKE 1 CAPSULE(0.4 MG) BY MOUTH EVERY DAY 90 capsule 3    vitamin D 185 MG Tab Take by mouth. 1 Tablet Oral Every day      ketoconazole (NIZORAL) 2 % shampoo Apply topically twice a week. 120 mL 0     No current facility-administered medications on file prior to visit.        Social History:   Social History     Social History    Marital status:      Spouse name: N/A    Number of children: N/A    Years of education: N/A     Occupational History           Social History Main Topics    Smoking status: Never Smoker    Smokeless tobacco: Not on file    Alcohol use Yes      Comment: A couple of beers a week    Drug use: No    Sexual activity: Not on file     Other Topics Concern    Not on file     Social History Narrative       REVIEW OF SYSTEMS:  Constitution: Negative. Negative for chills, fever and night sweats.   Cardiovascular: Negative for chest pain and syncope.   Respiratory: Negative for cough and shortness of breath.   Gastrointestinal: See HPI. Negative for nausea/vomiting. Negative for abdominal pain.  Genitourinary: See HPI. Negative for discoloration or dysuria.  Skin: Negative for dry skin, itching and rash.   Hematologic/Lymphatic: Negative for bleeding problem. Does not bruise/bleed easily.   Musculoskeletal: Negative for falls and muscle weakness.   Neurological: See HPI. No seizures.   Endocrine: Negative for polydipsia, polyphagia and polyuria.   Allergic/Immunologic: Negative for hives and persistent infections.     EXAM:  BP (!) 142/83 (BP Location: Right arm, Patient Position: Sitting, BP Method: Automatic)  Pulse 86  Ht 5' 4" (1.626 m)  Wt 81.8 kg (180 lb 7.1 oz)  BMI 30.97 kg/m2    General: The patient is a very pleasant 76 y.o. male in no apparent distress, the patient is oriented to person, place and time.  Psych: Normal mood and " affect  HEENT: Vision grossly intact, hearing intact to the spoken word.  Lungs: Respirations unlabored.  Gait: Normal station and gait, no difficulty with toe or heel walk.   Skin: Dorsal lumbar skin negative for rashes, lesions, hairy patches and surgical scars. There is mild lumbar tenderness to palpation.  Range of motion: Lumbar range of motion is acceptable.  Spinal Balance: Global saggital and coronal spinal balance acceptable, not significant for scoliosis and kyphosis.  Musculoskeletal: No pain with the range of motion of the bilateral hips. No trochanteric tenderness to palpation.  Vascular: Bilateral lower extremities warm and well perfused, dorsalis pedis pulses 2+ bilaterally.  Neurological: Normal strength and tone in all major motor groups in the bilateral lower extremities. Normal sensation to light touch in the L2-S1 dermatomes bilaterally with the exception of decreased sensation in the L4/5 and L5/S1 drematomes on the right.  Deep tendon reflexes symmetric 1+ in the bilateral lower extremities.  Negative Babinski bilaterally. Straight leg raise reproduces back pain on the right.    IMAGING:      Today I personally reviewed AP, Lat and Flex/Ex  upright L-spine films that demonstrate mild degenerative changes with slight anterolisthesis of L5/S1.      ASSESSMENT/PLAN:    Diagnoses and all orders for this visit:    Lumbar radiculopathy  -     methylPREDNISolone (MEDROL DOSEPACK) 4 mg tablet; use as directed  -     MRI Lumbar Spine Without Contrast; Future    The patient is having back pain with right lower extremity radiculopathy.  He has decreased sensation in the right lower extremity.  MRI lumbar spine for further evaluation.  Follow up after the MRI to discuss results and further treatment including ESIs and possibly surgery.       Return if symptoms worsen or fail to improve.

## 2017-05-03 NOTE — PROGRESS NOTES
"Taran Hernandes presented for a  Medicare AWV and comprehensive Health Risk Assessment today. The following components were reviewed and updated:    · Medical history  · Family History  · Social history  · Allergies and Current Medications  · Health Risk Assessment  · Health Maintenance  · Care Team     ** See Completed Assessments for Annual Wellness Visit within the encounter summary.**       The following assessments were completed:  · Living Situation  · CAGE  · Depression Screening  · Timed Get Up and Go  · Whisper Test  · Cognitive Function Screening  · Nutrition Screening  · ADL Screening  · PAQ Screening    Vitals:    05/02/17 0957   BP: (!) 140/80   Pulse: 60   Weight: 80.9 kg (178 lb 5.6 oz)   Height: 5' 4" (1.626 m)     Body mass index is 30.61 kg/(m^2).  Physical Exam   Constitutional: He is oriented to person, place, and time. He appears well-developed and well-nourished.   HENT:   Head: Normocephalic and atraumatic.   Nose: Nose normal.   Eyes: Conjunctivae and EOM are normal.   Neck: Normal range of motion. Neck supple.   Cardiovascular: Normal rate, regular rhythm, normal heart sounds and intact distal pulses.    Pulmonary/Chest: Effort normal and breath sounds normal.   Musculoskeletal: Normal range of motion.   Neurological: He is alert and oriented to person, place, and time.   Skin: Skin is warm and dry.   Psychiatric: He has a normal mood and affect. His behavior is normal. Judgment and thought content normal.   Nursing note and vitals reviewed.        Diagnoses and health risks identified today and associated recommendations/orders:    1. Encounter for preventive health examination  Assessment performed. Health maintenance updated. Chart review completed.    2. DDD (degenerative disc disease), lumbar  - Ambulatory Referral to Orthopedics    3. Essential hypertension  Chronic. Stable on medication. Followed by PCP.    4. Hyperlipidemia, unspecified hyperlipidemia type  Stable. HDL low. " Followed by PCP.    5. Elevated prostate specific antigen (PSA)  Chronic. Not to follow per Urology not 8/22/2016.    6. History of adenomatous polyp of colon  Stable. Followed by PCP. Colonoscopy due 2020.    7. BPH with urinary obstruction  Stable. Followed by Urology.    8. CKD (chronic kidney disease), stage III  Stable. Last creatinine 1.2. Followed by PCP.    9. BMI 30.0-30.9,adult  Exercises regularly. Stable. Discuss eating habits.    *Discussed immunizations. Declined today.    Provided Taran with a 5-10 year written screening schedule and personal prevention plan. Recommendations were developed using the USPSTF age appropriate recommendations. Education, counseling, and referrals were provided as needed. After Visit Summary printed and given to patient which includes a list of additional screenings\tests needed.    Return for follow up visit with Primary Care Provider, ;sooner if problems, HRA in 1 year.    SEAN Fisher

## 2017-05-10 ENCOUNTER — HOSPITAL ENCOUNTER (OUTPATIENT)
Dept: RADIOLOGY | Facility: HOSPITAL | Age: 77
Discharge: HOME | End: 2017-05-10
Attending: ORTHOPAEDIC SURGERY
Payer: MEDICARE

## 2017-05-10 DIAGNOSIS — M54.16 LUMBAR RADICULOPATHY: ICD-10-CM

## 2017-05-10 PROCEDURE — 72148 MRI LUMBAR SPINE W/O DYE: CPT | Mod: TC

## 2017-05-10 PROCEDURE — 72148 MRI LUMBAR SPINE W/O DYE: CPT | Mod: 26,,, | Performed by: RADIOLOGY

## 2017-05-12 ENCOUNTER — OFFICE VISIT (OUTPATIENT)
Dept: ORTHOPEDICS | Facility: CLINIC | Age: 77
End: 2017-05-12
Payer: MEDICARE

## 2017-05-12 VITALS — HEIGHT: 64 IN | WEIGHT: 175.94 LBS | BODY MASS INDEX: 30.04 KG/M2

## 2017-05-12 DIAGNOSIS — M43.10 SPONDYLOLISTHESIS, UNSPECIFIED SPINAL REGION: Primary | ICD-10-CM

## 2017-05-12 PROCEDURE — 1126F AMNT PAIN NOTED NONE PRSNT: CPT | Mod: S$GLB,,, | Performed by: PHYSICIAN ASSISTANT

## 2017-05-12 PROCEDURE — 99213 OFFICE O/P EST LOW 20 MIN: CPT | Mod: S$GLB,,, | Performed by: PHYSICIAN ASSISTANT

## 2017-05-12 PROCEDURE — 1160F RVW MEDS BY RX/DR IN RCRD: CPT | Mod: S$GLB,,, | Performed by: PHYSICIAN ASSISTANT

## 2017-05-12 PROCEDURE — 1159F MED LIST DOCD IN RCRD: CPT | Mod: S$GLB,,, | Performed by: PHYSICIAN ASSISTANT

## 2017-05-12 PROCEDURE — 99999 PR PBB SHADOW E&M-EST. PATIENT-LVL III: CPT | Mod: PBBFAC,,, | Performed by: PHYSICIAN ASSISTANT

## 2017-05-12 NOTE — PROGRESS NOTES
"DATE: 5/12/2017  PATIENT: Taran Hernandes    Attending Physician: Torsten Vo M.D.    HISTORY:  Taran Hernandes is a 76 y.o. male who returns to me today for MRI results.  He was last seen by me 5/3/2017.  Today he is doing well but notes his pain is significantly improved.  He denies pain today.  He does still have some pain in his right foot where he had a neuroma removed.  He says it feels like he is walking on rocks.     The Patient denies myelopathic symptoms such as handwriting changes or difficulty with buttons/coins/keys. Denies perineal paresthesias, bowel/bladder dysfunction.    PMH/PSH/FamHx/SocHx:  Unchanged from prior visit    ROS:  REVIEW OF SYSTEMS:  Constitution: Negative. Negative for chills, fever and night sweats.   HENT: Negative for congestion and headaches.    Eyes: Negative for blurred vision, left vision loss and right vision loss.   Cardiovascular: Negative for chest pain and syncope.   Respiratory: Negative for cough and shortness of breath.    Endocrine: Negative for polydipsia, polyphagia and polyuria.   Hematologic/Lymphatic: Negative for bleeding problem. Does not bruise/bleed easily.   Skin: Negative for dry skin, itching and rash.   Musculoskeletal: Negative for falls and muscle weakness.   Gastrointestinal: Negative for abdominal pain and bowel incontinence.   Allergic/Immunologic: Negative for hives and persistent infections.  Genitourinary: Negative for urinary retention/incontinence and nocturia.   Neurological: Negative for disturbances in coordination, no myelopathic symptoms such as handwriting changes or difficulty with buttons, coins, keys or small objects. No loss of balance and seizures.   Psychiatric/Behavioral: Negative for depression. The patient does not have insomnia.   Denies perineal paresthesias, bowel or bladder incontinence    EXAM:  Ht 5' 4" (1.626 m)  Wt 79.8 kg (175 lb 14.8 oz)  BMI 30.2 kg/m2    My physical examination was notable for the following " findings:     Normal station and gait, no difficulty with toe or heel walk.   Dorsal lumbar skin negative for rashes, lesions, hairy patches and surgical scars. There is minimal lumbar tenderness to palpation.  Lumbar range of motion is acceptable.  Global saggital and coronal spinal balance acceptable, not significant for scoliosis and kyphosis.  No pain with the range of motion of the bilateral hips. No trochanteric tenderness to palpation.  Bilateral lower extremities warm and well perfused, dorsalis pedis pulses 2+ bilaterally.  Normal strength and tone in all major motor groups in the bilateral lower extremities. Normal sensation to light touch in the L2-S1 dermatomes bilaterally.  Deep tendon reflexes symmetric 1+ in the bilateral lower extremities.  Negative Babinski bilaterally. Straight leg raise negative bilaterally.      IMAGING:  No new imaging today.    Today I personally re- reviewed AP, Lat and Flex/Ex  upright L-spine that demonstrate mild degenerative changes with slight anterolisthesis of L5/S1.      MRI lumbar spine demonstrates mild disc protrusion at L4/5 with moderate spinal stenosis.        ASSESSMENT/PLAN:    Diagnoses and all orders for this visit:    Spondylolisthesis, unspecified spinal region      The patient's pain is significantly improved.  He has an appointment with Dr. Barry in June to discuss the hall's neuroma.  Follow up with any new or worsening symptoms.  Will consider LUBNA if symptoms return.     Return if symptoms worsen or fail to improve.

## 2017-05-26 ENCOUNTER — PATIENT MESSAGE (OUTPATIENT)
Dept: INTERNAL MEDICINE | Facility: CLINIC | Age: 77
End: 2017-05-26

## 2017-05-27 ENCOUNTER — OFFICE VISIT (OUTPATIENT)
Dept: INTERNAL MEDICINE | Facility: CLINIC | Age: 77
End: 2017-05-27
Payer: MEDICARE

## 2017-05-27 VITALS
SYSTOLIC BLOOD PRESSURE: 137 MMHG | DIASTOLIC BLOOD PRESSURE: 83 MMHG | HEART RATE: 85 BPM | WEIGHT: 178.81 LBS | TEMPERATURE: 98 F | BODY MASS INDEX: 30.53 KG/M2 | HEIGHT: 64 IN | OXYGEN SATURATION: 96 %

## 2017-05-27 DIAGNOSIS — J30.9 CHRONIC ALLERGIC RHINITIS: Primary | ICD-10-CM

## 2017-05-27 PROCEDURE — 1126F AMNT PAIN NOTED NONE PRSNT: CPT | Mod: S$GLB,,, | Performed by: INTERNAL MEDICINE

## 2017-05-27 PROCEDURE — 1159F MED LIST DOCD IN RCRD: CPT | Mod: S$GLB,,, | Performed by: INTERNAL MEDICINE

## 2017-05-27 PROCEDURE — 99999 PR PBB SHADOW E&M-EST. PATIENT-LVL IV: CPT | Mod: PBBFAC,,, | Performed by: INTERNAL MEDICINE

## 2017-05-27 PROCEDURE — 99214 OFFICE O/P EST MOD 30 MIN: CPT | Mod: S$GLB,,, | Performed by: INTERNAL MEDICINE

## 2017-05-27 RX ORDER — DEXAMETHASONE 4 MG/1
4 TABLET ORAL EVERY 12 HOURS
Qty: 10 TABLET | Refills: 0 | Status: SHIPPED | OUTPATIENT
Start: 2017-05-27 | End: 2017-06-01

## 2017-05-27 RX ORDER — CETIRIZINE HYDROCHLORIDE 10 MG/1
10 TABLET ORAL DAILY
Refills: 0 | COMMUNITY
Start: 2017-05-27 | End: 2019-03-27

## 2017-05-27 RX ORDER — FLUTICASONE PROPIONATE 50 MCG
2 SPRAY, SUSPENSION (ML) NASAL DAILY
Qty: 16 G | Refills: 12 | Status: SHIPPED | OUTPATIENT
Start: 2017-05-27 | End: 2019-03-27

## 2017-05-27 NOTE — PATIENT INSTRUCTIONS
Allergic Rhinitis Treatment:    1. Take Zyrtec 1 tablet daily as needed during allergy season.  2. Take Flonase 2 puff each nostril daily during allergy season/air pollution.  3. Take Decadron 4 m tablet twice daily for 5 days.

## 2017-05-27 NOTE — PROGRESS NOTES
[facility]  Progress Note    PRESENTING HISTORY     PCP: Rosita Rios MD  Chief Complaint/Reason for Visit:     Chief Complaint   Patient presents with    URI     History of Present Illness & ROS : Mr. Taran Hernandes is a 76 y.o. male.      He has a cold for 2 weeks. He is taking OTC meds.  He has dry cough. Watery eyes and itchy.  Chest congestion. No chest pain.  SOB with cough.  Running nose.    Usually he has sinus allergy throughout the year.  No fever or chills.    PAST HISTORY:     Past Medical History:   Diagnosis Date    BPH (benign prostatic hyperplasia)     BPH with urinary obstruction 8/22/2016    DDD (degenerative disc disease), lumbar     Dyslipidemia     Elevated prostate specific antigen (PSA) 8/22/2016    Fatty liver     GERD (gastroesophageal reflux disease)     Hematuria     History of colonic polyps     Hypertension     Lumbar disc disease     Mild vitamin D deficiency     Renal disorder        Past Surgical History:   Procedure Laterality Date    CATARACT EXTRACTION W/  INTRAOCULAR LENS IMPLANT Left     Dr Ruggiero     CATARACT EXTRACTION W/  INTRAOCULAR LENS IMPLANT Right 03/27/16    Dr Ruggiero    CHOLECYSTECTOMY      FRACTURE SURGERY      left ankle    NEUROMA SURGERY Right 2006       Family History   Problem Relation Age of Onset    Cancer Mother      complications of c-scope/ suspect colon cancer    Hypertension Father     Diabetes Father     Heart disease Father     Cancer Brother      liver/ etoh    Hypertension Brother     No Known Problems Daughter     Heart attack Son     No Known Problems Son     No Known Problems Son     No Known Problems Son     Melanoma Neg Hx        Social History     Social History    Marital status:      Spouse name: N/A    Number of children: N/A    Years of education: N/A     Occupational History           Social History Main Topics    Smoking status: Never Smoker    Smokeless tobacco: None     Alcohol use Yes      Comment: A couple of beers a week    Drug use: No    Sexual activity: Not Asked     Other Topics Concern    None     Social History Narrative    None       MEDICATIONS & ALLERGIES:     Current Outpatient Prescriptions on File Prior to Visit   Medication Sig Dispense Refill    amlodipine (NORVASC) 10 MG tablet Take 1 tablet (10 mg total) by mouth once daily. 90 tablet 3    aspirin (ECOTRIN) 81 MG EC tablet Take 81 mg by mouth once daily.      losartan (COZAAR) 50 MG tablet Take 1 tablet (50 mg total) by mouth once daily. 30 tablet 3    omeprazole (PRILOSEC) 20 MG capsule Take 1 capsule (20 mg total) by mouth once daily. 90 capsule 3    vitamin D 185 MG Tab Take by mouth. 1 Tablet Oral Every day         0       0       3     No current facility-administered medications on file prior to visit.         Review of patient's allergies indicates:   Allergen Reactions    No known allergies        Medications Reconciliation:   I have reconciled the patient's home medications with the patient/family. I have updated all changes.  Refer to After-Visit Medication List.    OBJECTIVE:     Vital Signs:  Vitals:    05/27/17 1406   BP: 137/83   Pulse: 85   Temp: 98.3 °F (36.8 °C)     Wt Readings from Last 1 Encounters:   05/27/17 1406 81.1 kg (178 lb 12.7 oz)     Body mass index is 30.69 kg/m².     Physical Exam:  General: Well developed, well nourished. No distress.  HEENT: Head is normocephalic, atraumatic.  Ears: both external ears are normal.  TMs are normal bilaterally.  Nasal turbinates - moderately swollen and erythematous.  Pharynx - clear.  Sinus - mild.  Eyes: Clear conjunctiva bilaterally.  Neck: Supple, symmetrical neck; trachea midline.  Lymph Nodes: No cervical or supraclavicular adenopathy.  Lungs: Clear to auscultation bilaterally and normal respiratory effort.  Cardiovascular: Heart with regular rate and rhythm.      Laboratory  Lab Results   Component Value Date    WBC 7.13 03/09/2017     HGB 14.9 2017    HCT 44.3 2017     2017    CHOL 171 2016    TRIG 67 2016    HDL 32 (L) 2016    ALT 64 (H) 2017    AST 70 (H) 2017     2017    K 4.7 2017     2017    CREATININE 1.2 2017    BUN 13 2017    CO2 23 2017    TSH 2.016 2014    PSA 4.2 (H) 2016    INR 1.1 2010    HGBA1C 5.8 2017       ASSESSMENT & PLAN:     Chronic allergic rhinitis  - Due to pollution from airport [place].    Will treat PRN.  -     fluticasone (FLONASE) 50 mcg/actuation nasal spray; 2 sprays by Each Nare route once daily.  Dispense: 16 g; Refill: 12  -     cetirizine (ZYRTEC) 10 MG tablet; Take 1 tablet (10 mg total) by mouth once daily.; Refill: 0  -     dexamethasone (DECADRON) 4 MG Tab; Take 1 tablet (4 mg total) by mouth every 12 (twelve) hours.  Dispense: 10 tablet; Refill: 0    Instructions for the patient:  Allergic Rhinitis Treatment:\  1. Take Zyrtec 1 tablet daily as needed during allergy season.  2. Take Flonase 2 puff each nostril daily during allergy season/air pollution.  3. Take Decadron 4 m tablet twice daily for 5 days.    Scheduled Follow-up :  Future Appointments  Date Time Provider Department Center   2017 8:00 AM Sheng Barry MD [facility]       After Visit Medication List :     Medication List          Accurate as of 17  2:29 PM. Always use your most recent med list.               amlodipine 10 MG tablet  Commonly known as:  NORVASC  Take 1 tablet (10 mg total) by mouth once daily.     aspirin 81 MG EC tablet  Commonly known as:  ECOTRIN     cetirizine 10 MG tablet  Commonly known as:  ZYRTEC  Take 1 tablet (10 mg total) by mouth once daily.     dexamethasone 4 MG Tab  Commonly known as:  DECADRON  Take 1 tablet (4 mg total) by mouth every 12 (twelve) hours.     fluticasone 50 mcg/actuation nasal spray  Commonly known as:  FLONASE  2 sprays by Each Nare  route once daily.     losartan 50 MG tablet  Commonly known as:  COZAAR  Take 1 tablet (50 mg total) by mouth once daily.     omeprazole 20 MG capsule  Commonly known as:  PRILOSEC  Take 1 capsule (20 mg total) by mouth once daily.     vitamin D 1000 units Tab           Where to Get Your Medications      These medications were sent to [facility] [address] [place] [address] [address] [facility]  [address] [facility] [address]    Phone:  [phone]    dexamethasone 4 MG Tab   fluticasone 50 mcg/actuation nasal spray     You can get these medications from any pharmacy    You don't need a prescription for these medications   cetirizine 10 MG tablet         Signing Physician:  John Israel MD

## 2017-06-06 DIAGNOSIS — I10 ESSENTIAL HYPERTENSION: ICD-10-CM

## 2017-06-06 RX ORDER — LOSARTAN POTASSIUM 50 MG/1
TABLET ORAL
Qty: 90 TABLET | Refills: 0 | Status: SHIPPED | OUTPATIENT
Start: 2017-06-06 | End: 2017-10-04 | Stop reason: SDUPTHER

## 2017-06-12 ENCOUNTER — OFFICE VISIT (OUTPATIENT)
Dept: ORTHOPEDICS | Facility: CLINIC | Age: 77
End: 2017-06-12
Payer: MEDICARE

## 2017-06-12 ENCOUNTER — HOSPITAL ENCOUNTER (OUTPATIENT)
Dept: RADIOLOGY | Facility: HOSPITAL | Age: 77
Discharge: HOME | End: 2017-06-12
Attending: ORTHOPAEDIC SURGERY
Payer: MEDICARE

## 2017-06-12 VITALS — WEIGHT: 181.31 LBS | HEIGHT: 64 IN | BODY MASS INDEX: 30.95 KG/M2

## 2017-06-12 DIAGNOSIS — G57.61 MORTON'S NEUROMA OF THIRD INTERSPACE OF RIGHT FOOT: ICD-10-CM

## 2017-06-12 DIAGNOSIS — M79.671 RIGHT FOOT PAIN: Primary | ICD-10-CM

## 2017-06-12 DIAGNOSIS — M79.671 RIGHT FOOT PAIN: ICD-10-CM

## 2017-06-12 PROCEDURE — 99214 OFFICE O/P EST MOD 30 MIN: CPT | Mod: S$GLB,,, | Performed by: ORTHOPAEDIC SURGERY

## 2017-06-12 PROCEDURE — 1159F MED LIST DOCD IN RCRD: CPT | Mod: S$GLB,,, | Performed by: ORTHOPAEDIC SURGERY

## 2017-06-12 PROCEDURE — 73630 X-RAY EXAM OF FOOT: CPT | Mod: TC,RT

## 2017-06-12 PROCEDURE — 1125F AMNT PAIN NOTED PAIN PRSNT: CPT | Mod: S$GLB,,, | Performed by: ORTHOPAEDIC SURGERY

## 2017-06-12 PROCEDURE — 73630 X-RAY EXAM OF FOOT: CPT | Mod: 26,RT,, | Performed by: RADIOLOGY

## 2017-06-12 PROCEDURE — 99999 PR PBB SHADOW E&M-EST. PATIENT-LVL II: CPT | Mod: PBBFAC,,, | Performed by: ORTHOPAEDIC SURGERY

## 2017-06-12 RX ORDER — METHYLPREDNISOLONE 4 MG/1
TABLET ORAL
Qty: 1 PACKAGE | Refills: 0 | Status: SHIPPED | OUTPATIENT
Start: 2017-06-12 | End: 2017-08-02

## 2017-06-12 NOTE — PROGRESS NOTES
DATE: 6/12/2017  PATIENT: Taran Hernandes    CHIEF COMPLAINT: right plantar forefoot pain    HISTORY:  Taran Hernandes is a 76 y.o. male  here for initial evaluation of right plantar forefoot pain. The pain has been present for mildly and intermittently over past couple years but worse in past few months.  Pt underwent hall's neurectomy by myself in 2006 with resolution of pain initially.   . There is no history of trauma. The patient describes the pain as sharp; 8/10 a few weeks ago when he had steroid pack for his spine, now it is 3/10.  The pain is worse with ambulation and improved by rest. There is  associated numbness and tingling in 2nd and 3rd toes.  Prior treatments have included steroid pack.      PAST MEDICAL/SURGICAL HISTORY:  Past Medical History:   Diagnosis Date    BPH (benign prostatic hyperplasia)     BPH with urinary obstruction 8/22/2016    DDD (degenerative disc disease), lumbar     Dyslipidemia     Elevated prostate specific antigen (PSA) 8/22/2016    Fatty liver     GERD (gastroesophageal reflux disease)     Hematuria     History of colonic polyps     Hypertension     Lumbar disc disease     Mild vitamin D deficiency     Renal disorder      Past Surgical History:   Procedure Laterality Date    CATARACT EXTRACTION W/  INTRAOCULAR LENS IMPLANT Left     Dr Ruggiero     CATARACT EXTRACTION W/  INTRAOCULAR LENS IMPLANT Right 03/27/16    Dr Ruggiero    CHOLECYSTECTOMY      FRACTURE SURGERY      left ankle    NEUROMA SURGERY Right 2006       Current Medications:   Current Outpatient Prescriptions:     amlodipine (NORVASC) 10 MG tablet, Take 1 tablet (10 mg total) by mouth once daily., Disp: 90 tablet, Rfl: 3    aspirin (ECOTRIN) 81 MG EC tablet, Take 81 mg by mouth once daily., Disp: , Rfl:     cetirizine (ZYRTEC) 10 MG tablet, Take 1 tablet (10 mg total) by mouth once daily., Disp: , Rfl: 0    fluticasone (FLONASE) 50 mcg/actuation nasal spray, 2 sprays by Each Nare route once  "daily., Disp: 16 g, Rfl: 12    losartan (COZAAR) 50 MG tablet, TAKE 1 TABLET BY MOUTH ONCE DAILY(STOP BENAZEPRIL), Disp: 90 tablet, Rfl: 0    omeprazole (PRILOSEC) 20 MG capsule, Take 1 capsule (20 mg total) by mouth once daily., Disp: 90 capsule, Rfl: 3    vitamin D 185 MG Tab, Take by mouth. 1 Tablet Oral Every day, Disp: , Rfl:     Social History:   Social History     Social History    Marital status:      Spouse name: N/A    Number of children: N/A    Years of education: N/A     Occupational History           Social History Main Topics    Smoking status: Never Smoker    Smokeless tobacco: Never Used    Alcohol use Yes      Comment: A couple of beers a week    Drug use: No    Sexual activity: Not on file     Other Topics Concern    Not on file     Social History Narrative    No narrative on file       REVIEW OF SYSTEMS:  Constitution: Negative. Negative for chills, fever and night sweats.   Cardiovascular: Negative for chest pain and syncope.   Respiratory: Negative for cough and shortness of breath.   Gastrointestinal: See HPI. Negative for nausea/vomiting. Negative for abdominal pain.  Genitourinary: See HPI. Negative for discoloration or dysuria.  Skin: Negative for dry skin, itching and rash.   Hematologic/Lymphatic: Negative for bleeding problem. Does not bruise/bleed easily.   Musculoskeletal: Negative for falls and muscle weakness.   Neurological: See HPI. No seizures.   Endocrine: Negative for polydipsia, polyphagia and polyuria.   Allergic/Immunologic: Negative for hives and persistent infections.    PHYSICAL EXAMINATION:    Ht 5' 4" (1.626 m)   Wt 82.2 kg (181 lb 5.3 oz)   BMI 31.12 kg/m²     General: The patient is a  76 y.o. male in no apparent distress, the patient is orientatied to person, place and time.   Psych: Normal mood and affect  HEENT:  NCAT  Lungs:  Respirations are equal and unlabored.  CV:  2+ bilateral upper and lower extremity pulses.  Skin:  Intact " throughout.    MSK:    RLE:  - minimal scarring; tenderness in 2nd webspace plantarly  - AROM and PROM intact.  - TA/EHL/Gastroc/FHL assessed in isolation without deficit  - SILT throughout  - DP and PT palpated  2+  - Capillary Refill <3s          IMAGING:     Radiographs of the right foot were ordered and personally reviewed with the patient today.  They show no significant bony abnormality.    ASSESSMENT/PLAN:    Taran was seen today for pain.    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    Improved on medrol dose pack.  Will prescribe another dose pack as pt is going on vacation in a couple of weeks.  Will see back in 3 months for follow-up.    I have personally taken the history and examined this patient and agree with the residents note as stated above.  Possible recurrent neuroma from surgery for primary neuroma in 2006 from right 3rd space. Current flare up resolved with medrol mony. I prescribed another that he will take with him on vacation in July just in case he has another flare up.  May require further intervention if symptoms worsen.

## 2017-06-12 NOTE — LETTER
June 12, 2017      Erin Rabago PA-C  [address] [address]           [facility] - Orthopedics  [address]  [facility] [address]  Phone: [phone]          Patient: Taran Hernandes   MR Number: [id number]   YOB: 1940   Date of Visit: 6/12/2017       Dear Erin Rabago:    Thank you for referring Taran Hernandes to me for evaluation. Attached you will find relevant portions of my assessment and plan of care.    If you have questions, please do not hesitate to call me. I look forward to following Taran Hernandes along with you.    Sincerely,    Sheng Barry MD    Enclosure  CC:  No Recipients    If you would like to receive this communication electronically, please contact [email or web address] or [phone] to request more information on [employer] Link access.    For providers and/or their staff who would like to refer a patient to [facility], please contact us through our one-stop-shop provider referral line, [facility], at [phone].    If you feel you have received this communication in error or would no longer like to receive these types of communications, please e-mail [email or web address]

## 2017-07-17 ENCOUNTER — PATIENT MESSAGE (OUTPATIENT)
Dept: UROLOGY | Facility: CLINIC | Age: 77
End: 2017-07-17

## 2017-08-02 ENCOUNTER — OFFICE VISIT (OUTPATIENT)
Dept: UROLOGY | Facility: CLINIC | Age: 77
End: 2017-08-02
Payer: MEDICARE

## 2017-08-02 VITALS
BODY MASS INDEX: 30.78 KG/M2 | SYSTOLIC BLOOD PRESSURE: 131 MMHG | HEIGHT: 64 IN | DIASTOLIC BLOOD PRESSURE: 79 MMHG | WEIGHT: 180.31 LBS | HEART RATE: 75 BPM

## 2017-08-02 DIAGNOSIS — N40.1 BPH WITH URINARY OBSTRUCTION: Primary | ICD-10-CM

## 2017-08-02 DIAGNOSIS — N13.8 BPH WITH URINARY OBSTRUCTION: Primary | ICD-10-CM

## 2017-08-02 PROCEDURE — 99999 PR PBB SHADOW E&M-EST. PATIENT-LVL III: CPT | Mod: PBBFAC,,, | Performed by: UROLOGY

## 2017-08-02 PROCEDURE — 1159F MED LIST DOCD IN RCRD: CPT | Mod: S$GLB,,, | Performed by: UROLOGY

## 2017-08-02 PROCEDURE — 1126F AMNT PAIN NOTED NONE PRSNT: CPT | Mod: S$GLB,,, | Performed by: UROLOGY

## 2017-08-02 PROCEDURE — 99214 OFFICE O/P EST MOD 30 MIN: CPT | Mod: S$GLB,,, | Performed by: UROLOGY

## 2017-08-02 RX ORDER — TAMSULOSIN HYDROCHLORIDE 0.4 MG/1
0.4 CAPSULE ORAL DAILY
Qty: 30 CAPSULE | Refills: 11 | Status: SHIPPED | OUTPATIENT
Start: 2017-08-02 | End: 2019-01-21 | Stop reason: SDUPTHER

## 2017-08-02 NOTE — PROGRESS NOTES
CHIEF COMPLAINT:    Mr. Hernandes is a 76 y.o. male presenting with LUTS.    PRESENTING ILLNESS:    Taran Hernandes is a 76 y.o. male with a history of an elevated PSA s/p a negative TRUS bx in 2013 when his PSA was 4.6.      He c/o LUTS.   He was started on flomax.  This improved his symptoms somewhat.  However, he stopped the flomax on his own and now re-presents.   He now c/o nocturia x 6-8.  + hesitancy.  + intermittency.  + decreased FOS.  No dysuria.  No hematuria.  No f/c    He denies ED.      REVIEW OF SYSTEMS:    Patient denies bleeding diathesis, chills, dysuria, fever, flank pain, frequency or urgency, hematuria, stones, stress or urgency incontinence, TB or genitourinary trauma and urethral discharge.  Taran Hernandes denies any history of headache, blurred vision, fever, nausea, vomiting, chills, abdominal pain, bleeding per rectum, cough, SOB, recent loss of consciousness, recent mental status changes, seizures, dizziness, or upper or lower extremity weakness.    FALLON  1. 3  2. 3  3. 3  4. 3  5. 3     PATIENT HISTORY:    Past Medical History:   Diagnosis Date    BPH (benign prostatic hyperplasia)     BPH with urinary obstruction 8/22/2016    DDD (degenerative disc disease), lumbar     Dyslipidemia     Elevated prostate specific antigen (PSA) 8/22/2016    Fatty liver     GERD (gastroesophageal reflux disease)     Hematuria     History of adenomatous polyp of colon 7/17/2015    History of colonic polyps     Hypertension     Lumbar disc disease     Mild vitamin D deficiency     Renal disorder        Past Surgical History:   Procedure Laterality Date    CATARACT EXTRACTION W/  INTRAOCULAR LENS IMPLANT Left     Dr Ruggiero     CATARACT EXTRACTION W/  INTRAOCULAR LENS IMPLANT Right 03/27/16    Dr Ruggiero    CHOLECYSTECTOMY      FRACTURE SURGERY      left ankle    LASIK Bilateral 2016    NEUROMA SURGERY Right 2006       Family History   Problem Relation Age of Onset    Cancer Mother       complications of c-scope/ suspect colon cancer    Hypertension Father     Diabetes Father     Heart disease Father     Cancer Brother      liver/ etoh    Hypertension Brother     No Known Problems Daughter     Heart attack Son     No Known Problems Son     No Known Problems Son     No Known Problems Son     Melanoma Neg Hx        Social History     Social History    Marital status:      Spouse name: N/A    Number of children: N/A    Years of education: N/A     Occupational History           Social History Main Topics    Smoking status: Never Smoker    Smokeless tobacco: Never Used    Alcohol use Yes      Comment: A couple of beers a week    Drug use: No    Sexual activity: Yes     Partners: Female     Other Topics Concern    Not on file     Social History Narrative    No narrative on file       Allergies:  No known allergies    Medications:    Current Outpatient Prescriptions:     amlodipine (NORVASC) 10 MG tablet, Take 1 tablet (10 mg total) by mouth once daily., Disp: 90 tablet, Rfl: 3    aspirin (ECOTRIN) 81 MG EC tablet, Take 81 mg by mouth once daily., Disp: , Rfl:     cetirizine (ZYRTEC) 10 MG tablet, Take 1 tablet (10 mg total) by mouth once daily., Disp: , Rfl: 0    fluticasone (FLONASE) 50 mcg/actuation nasal spray, 2 sprays by Each Nare route once daily., Disp: 16 g, Rfl: 12    losartan (COZAAR) 50 MG tablet, TAKE 1 TABLET BY MOUTH ONCE DAILY(STOP BENAZEPRIL), Disp: 90 tablet, Rfl: 0    omeprazole (PRILOSEC) 20 MG capsule, Take 1 capsule (20 mg total) by mouth once daily., Disp: 90 capsule, Rfl: 3    vitamin D 185 MG Tab, Take by mouth. 1 Tablet Oral Every day, Disp: , Rfl:     PHYSICAL EXAMINATION:    The patient generally appears in good health, is appropriately interactive, and is in no apparent distress.     Eyes: anicteric sclerae, moist conjunctivae; no lid-lag; PERRLA     HENT: Atraumatic; oropharynx clear with moist mucous membranes and no mucosal  ulcerations;normal hard and soft palate.  No evidence of lymphadenopathy.    Neck: Trachea midline.  No thyromegaly.    Musculoskeletal: No abnormal gait.    Skin: No lesions.    Mental: Cooperative with normal affect.  Is oriented to time, place, and person.    Neuro: Grossly intact.    Chest: Normal inspiratory effort.   No accessory muscles.  No audible wheezes.  Respirations symmetric on inspiration and expiration.    Heart: Regular rhythm.      Abdomen:  Soft, non-tender. No masses or organomegaly. Bladder is not palpable. No evidence of flank discomfort. No evidence of inguinal hernia.    Genitourinary: The penis is not circumcised with no evidence of plaques or induration. The urethral meatus is normal. The testes, epididymides, and cord structures are normal in size and contour bilaterally. The scrotum is normal in size and contour.    Normal anal sphincter tone. No rectal mass.    The prostate is 40 g. Normal landmarks. Lateral sulci. Median furrow intact.  No nodularity or induration. Seminal vesicles are normal.    Extremities: No clubbing, cyanosis, or edema      LABS:      Lab Results   Component Value Date    PSA 4.2 (H) 07/20/2016    PSA 4.9 (H) 06/03/2015    PSA 5.4 (H) 05/27/2014       IMPRESSION:    Encounter Diagnoses   Name Primary?    BPH with urinary obstruction Yes         PLAN:    1. Would not recommend checking any further PSA's based off age and life expectancy.  2. Recommend that he restart flomax. Side effects discussed.  A new Rx was given  3. RTC 3 months to see an CARYL.    Copy to:

## 2017-09-14 ENCOUNTER — OFFICE VISIT (OUTPATIENT)
Dept: ORTHOPEDICS | Facility: CLINIC | Age: 77
End: 2017-09-14
Payer: MEDICARE

## 2017-09-14 VITALS — WEIGHT: 180.44 LBS | HEIGHT: 64 IN | BODY MASS INDEX: 30.81 KG/M2

## 2017-09-14 DIAGNOSIS — G57.61 MORTON'S NEUROMA OF THIRD INTERSPACE OF RIGHT FOOT: ICD-10-CM

## 2017-09-14 DIAGNOSIS — M79.671 RIGHT FOOT PAIN: Primary | ICD-10-CM

## 2017-09-14 PROCEDURE — 99213 OFFICE O/P EST LOW 20 MIN: CPT | Mod: S$GLB,,, | Performed by: ORTHOPAEDIC SURGERY

## 2017-09-14 PROCEDURE — 1159F MED LIST DOCD IN RCRD: CPT | Mod: S$GLB,,, | Performed by: ORTHOPAEDIC SURGERY

## 2017-09-14 PROCEDURE — 3008F BODY MASS INDEX DOCD: CPT | Mod: S$GLB,,, | Performed by: ORTHOPAEDIC SURGERY

## 2017-09-14 PROCEDURE — 99999 PR PBB SHADOW E&M-EST. PATIENT-LVL III: CPT | Mod: PBBFAC,,, | Performed by: ORTHOPAEDIC SURGERY

## 2017-09-14 PROCEDURE — 1126F AMNT PAIN NOTED NONE PRSNT: CPT | Mod: S$GLB,,, | Performed by: ORTHOPAEDIC SURGERY

## 2017-09-14 RX ORDER — METHYLPREDNISOLONE 4 MG/1
TABLET ORAL
Qty: 1 PACKAGE | Refills: 0 | Status: SHIPPED | OUTPATIENT
Start: 2017-09-14 | End: 2017-10-23

## 2017-09-15 NOTE — PROGRESS NOTES
Mr. Hernandes returns today.  This is a 76-year-old gentleman who I performed   Henao neuroma surgery on in 2006.  A nerve was removed from his right third   intermetatarsal space.  He presented to me about three months ago with a couple   of year history of recurrent pain in the bottom of his foot in the region of his   previous surgery.  His clinical exam at that time suggested a possible   recurrent neuroma of his right third space.  X-rays at that time did not show   any obvious abnormalities.  He was getting ready to go on a trip, so I gave him   a Medrol pack, which he states helped for a couple of weeks.  He returns today   for followup.  He has continued symptoms.  He would like to get another Medrol   pack for another trip he is leaving on tomorrow.  Examination today reveals   continued pain.  I did have some discussion regarding possible reoperation, but   I would like to get an MRI scan prior to considering any further surgery.  We   are going to set him up for the MRI and will have him return to see me with the   results.      SUDHAKAR/EDUARDO  dd: 09/14/2017 09:44:20 (CELINE)  td: 09/14/2017 21:38:09 (CELINE)  Doc ID   #[id number]  Job ID #[id number]    CC:

## 2017-09-25 ENCOUNTER — HOSPITAL ENCOUNTER (OUTPATIENT)
Dept: RADIOLOGY | Facility: HOSPITAL | Age: 77
Discharge: HOME | End: 2017-09-25
Attending: ORTHOPAEDIC SURGERY
Payer: MEDICARE

## 2017-09-25 DIAGNOSIS — M79.671 RIGHT FOOT PAIN: ICD-10-CM

## 2017-09-25 DIAGNOSIS — G57.61 MORTON'S NEUROMA OF THIRD INTERSPACE OF RIGHT FOOT: ICD-10-CM

## 2017-09-25 LAB
CREAT SERPL-MCNC: 1.1 MG/DL (ref 0.5–1.4)
SAMPLE: NORMAL

## 2017-09-25 PROCEDURE — A9585 GADOBUTROL INJECTION: HCPCS | Performed by: ORTHOPAEDIC SURGERY

## 2017-09-25 PROCEDURE — 73718 MRI LOWER EXTREMITY W/O DYE: CPT | Mod: TC,RT

## 2017-09-25 PROCEDURE — 25500020 PHARM REV CODE 255: Performed by: ORTHOPAEDIC SURGERY

## 2017-09-25 PROCEDURE — 73718 MRI LOWER EXTREMITY W/O DYE: CPT | Mod: 26,RT,, | Performed by: RADIOLOGY

## 2017-09-25 RX ORDER — GADOBUTROL 604.72 MG/ML
9 INJECTION INTRAVENOUS
Status: COMPLETED | OUTPATIENT
Start: 2017-09-25 | End: 2017-09-25

## 2017-09-25 RX ADMIN — GADOBUTROL 9 ML: 604.72 INJECTION INTRAVENOUS at 05:09

## 2017-10-04 DIAGNOSIS — I10 ESSENTIAL HYPERTENSION: ICD-10-CM

## 2017-10-04 RX ORDER — AMLODIPINE BESYLATE 10 MG/1
TABLET ORAL
Qty: 90 TABLET | Refills: 1 | Status: SHIPPED | OUTPATIENT
Start: 2017-10-04 | End: 2018-02-27 | Stop reason: SDUPTHER

## 2017-10-04 RX ORDER — LOSARTAN POTASSIUM 50 MG/1
TABLET ORAL
Qty: 90 TABLET | Refills: 0 | Status: SHIPPED | OUTPATIENT
Start: 2017-10-04 | End: 2017-11-30 | Stop reason: DRUGHIGH

## 2017-10-23 ENCOUNTER — OFFICE VISIT (OUTPATIENT)
Dept: ORTHOPEDICS | Facility: CLINIC | Age: 77
End: 2017-10-23
Payer: MEDICARE

## 2017-10-23 VITALS — HEIGHT: 64 IN | WEIGHT: 177.5 LBS | BODY MASS INDEX: 30.3 KG/M2

## 2017-10-23 DIAGNOSIS — M79.671 RIGHT FOOT PAIN: ICD-10-CM

## 2017-10-23 DIAGNOSIS — G57.61 MORTON'S NEUROMA OF THIRD INTERSPACE OF RIGHT FOOT: Primary | ICD-10-CM

## 2017-10-23 PROCEDURE — 64455 NJX AA&/STRD PLTR COM DG NRV: CPT | Mod: RT,S$GLB,, | Performed by: ORTHOPAEDIC SURGERY

## 2017-10-23 PROCEDURE — 99213 OFFICE O/P EST LOW 20 MIN: CPT | Mod: 25,S$GLB,, | Performed by: ORTHOPAEDIC SURGERY

## 2017-10-23 PROCEDURE — 99999 PR PBB SHADOW E&M-EST. PATIENT-LVL III: CPT | Mod: PBBFAC,,, | Performed by: ORTHOPAEDIC SURGERY

## 2017-10-23 RX ORDER — METHYLPREDNISOLONE ACETATE 80 MG/ML
80 INJECTION, SUSPENSION INTRA-ARTICULAR; INTRALESIONAL; INTRAMUSCULAR; SOFT TISSUE
Status: COMPLETED | OUTPATIENT
Start: 2017-10-23 | End: 2017-10-23

## 2017-10-23 RX ORDER — METHYLPREDNISOLONE 4 MG/1
TABLET ORAL
Qty: 1 PACKAGE | Refills: 0 | Status: SHIPPED | OUTPATIENT
Start: 2017-10-23 | End: 2017-11-30 | Stop reason: ALTCHOICE

## 2017-10-23 RX ORDER — ACETAMINOPHEN 500 MG
1000 TABLET ORAL DAILY PRN
COMMUNITY

## 2017-10-23 RX ADMIN — METHYLPREDNISOLONE ACETATE 80 MG: 80 INJECTION, SUSPENSION INTRA-ARTICULAR; INTRALESIONAL; INTRAMUSCULAR; SOFT TISSUE at 11:10

## 2017-10-24 NOTE — PROGRESS NOTES
Mr. Ackermans negative as returns today.  This is a 76-year-old gentleman from   whom I removed a neuroma on his right foot third intermetatarsal space in 2006.    He came back recently with a recurrence of pain.  There is previous surgery and   I was concerned that he may have a recurrent neuroma.  I obtained an MRI scan   to rule out any other structural abnormalities.  An MRI does not show any   structural abnormalities in the region of his pain.  He has gotten temporary   relief from Medrol George when he has gone on trips.  I suggested that we do a   diagnostic and possibly therapeutic injection today.  He continues to have   tenderness in the third intermetatarsal space, especially plantarly.  After   verbal consent and sterile prep, I injected the third intermetatarsal space with   2 mL of lidocaine and 1 mL of Depo-Medrol.  We will see how he does over the   next several weeks.  I am going to have him return to see me to discuss his   response to the injection.      DEEPAK  dd: 10/23/2017 11:11:00 (CELINE)  td: 10/23/2017 22:57:46 (CELINE)  Doc ID   #[id number]  Job ID #[id number]    CC:

## 2017-11-27 ENCOUNTER — PATIENT MESSAGE (OUTPATIENT)
Dept: INTERNAL MEDICINE | Facility: CLINIC | Age: 77
End: 2017-11-27

## 2017-11-30 ENCOUNTER — IMMUNIZATION (OUTPATIENT)
Dept: INTERNAL MEDICINE | Facility: CLINIC | Age: 77
End: 2017-11-30
Payer: MEDICARE

## 2017-11-30 ENCOUNTER — OFFICE VISIT (OUTPATIENT)
Dept: INTERNAL MEDICINE | Facility: CLINIC | Age: 77
End: 2017-11-30
Payer: MEDICARE

## 2017-11-30 VITALS
BODY MASS INDEX: 30.5 KG/M2 | HEIGHT: 64 IN | HEART RATE: 94 BPM | SYSTOLIC BLOOD PRESSURE: 140 MMHG | WEIGHT: 178.63 LBS | DIASTOLIC BLOOD PRESSURE: 80 MMHG | TEMPERATURE: 99 F

## 2017-11-30 DIAGNOSIS — R35.89 FREQUENCY OF URINATION AND POLYURIA: ICD-10-CM

## 2017-11-30 DIAGNOSIS — Z23 INFLUENZA VACCINE NEEDED: ICD-10-CM

## 2017-11-30 DIAGNOSIS — N18.30 CKD (CHRONIC KIDNEY DISEASE) STAGE 3, GFR 30-59 ML/MIN: ICD-10-CM

## 2017-11-30 DIAGNOSIS — N40.1 BPH WITH URINARY OBSTRUCTION: ICD-10-CM

## 2017-11-30 DIAGNOSIS — H91.93 BILATERAL HEARING LOSS, UNSPECIFIED HEARING LOSS TYPE: ICD-10-CM

## 2017-11-30 DIAGNOSIS — R35.0 FREQUENCY OF URINATION AND POLYURIA: ICD-10-CM

## 2017-11-30 DIAGNOSIS — K76.0 FATTY LIVER: ICD-10-CM

## 2017-11-30 DIAGNOSIS — N13.8 BPH WITH URINARY OBSTRUCTION: ICD-10-CM

## 2017-11-30 DIAGNOSIS — I10 ESSENTIAL HYPERTENSION: Primary | ICD-10-CM

## 2017-11-30 DIAGNOSIS — R73.03 PRE-DIABETES: ICD-10-CM

## 2017-11-30 LAB
BILIRUB UR QL STRIP: NEGATIVE
CLARITY UR REFRACT.AUTO: CLEAR
COLOR UR AUTO: YELLOW
GLUCOSE SERPL-MCNC: 102 MG/DL (ref 70–110)
GLUCOSE UR QL STRIP: NEGATIVE
HGB UR QL STRIP: NEGATIVE
KETONES UR QL STRIP: NEGATIVE
LEUKOCYTE ESTERASE UR QL STRIP: NEGATIVE
NITRITE UR QL STRIP: NEGATIVE
PH UR STRIP: 7 [PH] (ref 5–8)
PROT UR QL STRIP: NEGATIVE
SP GR UR STRIP: 1.01 (ref 1–1.03)
URN SPEC COLLECT METH UR: NORMAL
UROBILINOGEN UR STRIP-ACNC: 4 EU/DL

## 2017-11-30 PROCEDURE — 90662 IIV NO PRSV INCREASED AG IM: CPT | Mod: S$GLB,,, | Performed by: INTERNAL MEDICINE

## 2017-11-30 PROCEDURE — 82948 REAGENT STRIP/BLOOD GLUCOSE: CPT | Mod: S$GLB,,, | Performed by: INTERNAL MEDICINE

## 2017-11-30 PROCEDURE — 81003 URINALYSIS AUTO W/O SCOPE: CPT

## 2017-11-30 PROCEDURE — 99499 UNLISTED E&M SERVICE: CPT | Mod: S$GLB,,, | Performed by: INTERNAL MEDICINE

## 2017-11-30 PROCEDURE — 99999 PR PBB SHADOW E&M-EST. PATIENT-LVL V: CPT | Mod: PBBFAC,,, | Performed by: INTERNAL MEDICINE

## 2017-11-30 PROCEDURE — 87086 URINE CULTURE/COLONY COUNT: CPT

## 2017-11-30 PROCEDURE — G0008 ADMIN INFLUENZA VIRUS VAC: HCPCS | Mod: S$GLB,,, | Performed by: INTERNAL MEDICINE

## 2017-11-30 PROCEDURE — 99214 OFFICE O/P EST MOD 30 MIN: CPT | Mod: S$GLB,,, | Performed by: INTERNAL MEDICINE

## 2017-11-30 RX ORDER — LOSARTAN POTASSIUM 100 MG/1
100 TABLET ORAL DAILY
Qty: 90 TABLET | Refills: 1 | Status: SHIPPED | OUTPATIENT
Start: 2017-11-30 | End: 2018-06-10 | Stop reason: SDUPTHER

## 2017-11-30 NOTE — PROGRESS NOTES
Two patient identifiers and allergies reviewed . High Dose Flu Vaccine ordered per Blanca Narayan ,  verified and administered to left  deltoid. Pt tolerated injection well; no swelling, redness, or bruising noted at injection site. Pt advised to remain in clinic 15 minutes following injection for observation , verbalizes understanding .

## 2017-12-01 LAB — BACTERIA UR CULT: NO GROWTH

## 2017-12-01 NOTE — PROGRESS NOTES
Subjective:       Patient ID: Taran Hernandes is a 77 y.o. male.    Chief Complaint: Urinary Frequency    Last seen 9 months ago. Returns urgently with multiple complaints - headache, urinary frequency, hearing loss.   1. Headache began one week ago in the setting of acute viral syndrome with fever, malaise and generalized body aches. He never developed any respiratory symptoms, but did have nausea, with no vomiting or diarrhea. Symptoms began to subside after about 48 hours. Mild frontal headache remains, relieved with Tylenol. No recent travel. He wakes with a bitter taste in his mouth and when he rinses it out the water is brown; denies any oral or dental pain, nosebleeds or sinus symptoms. Has had itchy watery eyes. No home BP monitoring lately.   2. Increased urinary frequency is chronic and unchanged since he was evaluated by Urology three months ago and prescribed Flomax for BPH with no improvement. No dysuria, hematuria. No polydipsia.   3. Gradual hearing loss both ears, no ear pain, tinnitus. Just noticed he has to play the TV louder.    He requests a Flu shot today, wants to get it before leaving for [place] in three weeks.     PMH:  Hypertension. Stress Echo negative 12/11, EF 60%.  Mild Renal Insufficiency.   Fatty Liver.  GERD.  Colon Polyp, adenomas.   Hematuria, recurrent UTI, BPH - Cystoscopy 3/10, TRUS with biopsy 2/13.  Mild Dyslipidemia.   Mild Lumbar DJD/Disc disease.   Diffuse Diverticulosis.  Acute Prostatitis with sepsis Feb. 2013.  Acute Pancreatitis Aug. '16.      Colonoscopy 7/15 one tubular adenoma, subepithelial lipoma in rectum. PSA 4.2 July '16, followed by Dr. King/Urology. Eye exam 2016. Flu shot 2014. Tetanus 2008. Pneumovax 12/11. Prevnar 6/15. Labs 3/17: CBC normal, CMP normal except AST 70, ALT 64, GFR 58, Lipase 51, HbA1c 5.8%, Urinalysis clear.     PSH: Cholecystectomy. Left Ankle Fracture. Bilateral Cataract Extraction.    Social: Never smoked, No alcohol. , 6  children all living, 5 local.  - plans to retire soon and would like to move back to [place].     FMH: Father  age 76 with HTN, DM, Heart disease. Mother  age 82 complications of C-scope, suspected to have Colon cancer. Half brother had osteosarcoma. Half brother had liver cancer, heavy EtOH. Half brother  of a stroke noncompliant with HTN management.     NKDA. No food allergies.     MEDICATIONS: list reviewed and reconciled. Uses Omeprazole daily. Compliant with daily BP meds. Took a Medrol pack a month ago.             Review of Systems   Constitutional: Negative for activity change, appetite change, chills, fatigue, fever and unexpected weight change.   HENT: Positive for hearing loss. Negative for congestion, ear pain, postnasal drip, rhinorrhea, sore throat, trouble swallowing and voice change.    Eyes: Positive for discharge. Negative for pain, itching and visual disturbance.   Respiratory: Negative for apnea, cough, chest tightness, shortness of breath and wheezing.    Cardiovascular: Negative for chest pain, palpitations and leg swelling.   Gastrointestinal: Negative for abdominal pain, blood in stool, constipation, diarrhea, nausea and vomiting.   Endocrine: Positive for polyuria. Negative for polydipsia.   Genitourinary: Positive for frequency and urgency. Negative for difficulty urinating, dysuria, hematuria and scrotal swelling.   Musculoskeletal: Positive for arthralgias and joint swelling. Negative for back pain, gait problem, myalgias, neck pain and neck stiffness.        Mild paresthesias in legs with no pain or weakness; known history of lumbar spondylosis.    Skin: Negative for color change, rash and wound.   Neurological: Positive for headaches. Negative for dizziness, seizures, syncope, weakness and numbness.   Hematological: Negative for adenopathy. Does not bruise/bleed easily.   Psychiatric/Behavioral: Negative for agitation, confusion, decreased concentration, dysphoric  "mood and sleep disturbance. The patient is not nervous/anxious.        Objective:    /90, repeat 140/80, Pulse 94, Ht 5' 4", Wt 178.6 lbs (stable), BMI=30.7, Non-fasting Glucose =102 here this afternoon.   Physical Exam   Constitutional: He is oriented to person, place, and time. He appears well-developed and well-nourished. No distress.   Well groomed, ambulatory, not ill-appearing.    HENT:   Head: Normocephalic and atraumatic.   Right Ear: External ear normal.   Left Ear: External ear normal.   Nose: Nose normal.   Mouth/Throat: Oropharynx is clear and moist.   Eyes: Conjunctivae and EOM are normal. Pupils are equal, round, and reactive to light. Right eye exhibits no discharge. Left eye exhibits no discharge. No scleral icterus.   Neck: Normal range of motion. Neck supple. No JVD present. No thyromegaly present.   Cardiovascular: Normal rate, regular rhythm, normal heart sounds and intact distal pulses.  Exam reveals no gallop and no friction rub.    No murmur heard.  Pulmonary/Chest: Effort normal and breath sounds normal. No respiratory distress. He has no wheezes. He has no rales.   Abdominal: Soft. Bowel sounds are normal. He exhibits no distension and no mass. There is no tenderness.   Musculoskeletal: Normal range of motion. He exhibits no edema, tenderness or deformity.   Lymphadenopathy:     He has no cervical adenopathy.   Neurological: He is alert and oriented to person, place, and time. He has normal reflexes. No cranial nerve deficit. He exhibits normal muscle tone. Coordination normal.   Skin: Skin is warm and dry. No rash noted. He is not diaphoretic.   Psychiatric: He has a normal mood and affect. His behavior is normal. Thought content normal.       Assessment:       1. Essential hypertension    2. Pre-diabetes    3. BPH with urinary obstruction    4. CKD (chronic kidney disease) stage 3, GFR 30-59 ml/min    5. Fatty liver    6. Frequency of urination and polyuria    7. Bilateral hearing " loss, unspecified hearing loss type    8. Influenza vaccine needed        Plan:       Essential hypertension  -     Increase Losartan (COZAAR) from 50 to 100 MG tablet; Take 1 tablet (100 mg total) by mouth once daily.  Dispense: 90 tablet; Refill: 1  -     Comprehensive metabolic panel; Future; Expected date: 11/30/2017  -     Lipid panel; Future; Expected date: 11/30/2017    Pre-diabetes  -     POCT Glucose    BPH with urinary obstruction        -     Continue Flomax.    CKD (chronic kidney disease) stage 3, GFR 30-59 ml/min    Fatty liver    Frequency of urination and polyuria  -     Urinalysis  -     Urine culture    Bilateral hearing loss, unspecified hearing loss type  -     Ambulatory Referral to ENT    Influenza vaccine needed        -     Flu shot today.

## 2017-12-12 ENCOUNTER — LAB VISIT (OUTPATIENT)
Dept: LAB | Facility: HOSPITAL | Age: 77
End: 2017-12-12
Attending: INTERNAL MEDICINE
Payer: MEDICARE

## 2017-12-12 ENCOUNTER — PATIENT MESSAGE (OUTPATIENT)
Dept: INTERNAL MEDICINE | Facility: CLINIC | Age: 77
End: 2017-12-12

## 2017-12-12 DIAGNOSIS — I10 ESSENTIAL HYPERTENSION: ICD-10-CM

## 2017-12-12 LAB
ALBUMIN SERPL BCP-MCNC: 4.1 G/DL
ALP SERPL-CCNC: 65 U/L
ALT SERPL W/O P-5'-P-CCNC: 47 U/L
ANION GAP SERPL CALC-SCNC: 10 MMOL/L
AST SERPL-CCNC: 38 U/L
BILIRUB SERPL-MCNC: 1 MG/DL
BUN SERPL-MCNC: 16 MG/DL
CALCIUM SERPL-MCNC: 9.2 MG/DL
CHLORIDE SERPL-SCNC: 108 MMOL/L
CHOLEST SERPL-MCNC: 188 MG/DL
CHOLEST/HDLC SERPL: 4.9 {RATIO}
CO2 SERPL-SCNC: 23 MMOL/L
CREAT SERPL-MCNC: 1.4 MG/DL
EST. GFR  (AFRICAN AMERICAN): 55 ML/MIN/1.73 M^2
EST. GFR  (NON AFRICAN AMERICAN): 48 ML/MIN/1.73 M^2
GLUCOSE SERPL-MCNC: 96 MG/DL
HDLC SERPL-MCNC: 38 MG/DL
HDLC SERPL: 20.2 %
LDLC SERPL CALC-MCNC: 130.6 MG/DL
NONHDLC SERPL-MCNC: 150 MG/DL
POTASSIUM SERPL-SCNC: 4.1 MMOL/L
PROT SERPL-MCNC: 7.9 G/DL
SODIUM SERPL-SCNC: 141 MMOL/L
TRIGL SERPL-MCNC: 97 MG/DL

## 2017-12-12 PROCEDURE — 80061 LIPID PANEL: CPT

## 2017-12-12 PROCEDURE — 80053 COMPREHEN METABOLIC PANEL: CPT

## 2017-12-12 PROCEDURE — 36415 COLL VENOUS BLD VENIPUNCTURE: CPT

## 2018-01-09 ENCOUNTER — CLINICAL SUPPORT (OUTPATIENT)
Dept: AUDIOLOGY | Facility: CLINIC | Age: 78
End: 2018-01-09
Payer: MEDICARE

## 2018-01-09 ENCOUNTER — OFFICE VISIT (OUTPATIENT)
Dept: OTOLARYNGOLOGY | Facility: CLINIC | Age: 78
End: 2018-01-09
Payer: MEDICARE

## 2018-01-09 VITALS
SYSTOLIC BLOOD PRESSURE: 149 MMHG | HEART RATE: 91 BPM | WEIGHT: 186.06 LBS | BODY MASS INDEX: 31.77 KG/M2 | HEIGHT: 64 IN | TEMPERATURE: 98 F | DIASTOLIC BLOOD PRESSURE: 88 MMHG

## 2018-01-09 DIAGNOSIS — Z77.122 HISTORY OF EXPOSURE TO NOISE: ICD-10-CM

## 2018-01-09 DIAGNOSIS — H90.3 SENSORINEURAL HEARING LOSS, BILATERAL: Primary | ICD-10-CM

## 2018-01-09 DIAGNOSIS — Q18.1 CYST OF EAR CANAL: ICD-10-CM

## 2018-01-09 PROCEDURE — 92550 TYMPANOMETRY & REFLEX THRESH: CPT | Mod: S$GLB,,, | Performed by: PHYSICIAN ASSISTANT

## 2018-01-09 PROCEDURE — 99999 PR PBB SHADOW E&M-EST. PATIENT-LVL III: CPT | Mod: PBBFAC,,, | Performed by: OTOLARYNGOLOGY

## 2018-01-09 PROCEDURE — 99203 OFFICE O/P NEW LOW 30 MIN: CPT | Mod: S$GLB,,, | Performed by: OTOLARYNGOLOGY

## 2018-01-09 PROCEDURE — 92557 COMPREHENSIVE HEARING TEST: CPT | Mod: S$GLB,,, | Performed by: PHYSICIAN ASSISTANT

## 2018-01-09 PROCEDURE — 99999 PR PBB SHADOW E&M-EST. PATIENT-LVL I: CPT | Mod: PBBFAC,,,

## 2018-01-09 NOTE — PROGRESS NOTES
Taran Hernandes was seen in the clinic today for an audiological evaluation.   He reported bilateral hearing loss but denied tinnitus and vertigo.    Audiological testing revealed normal sloping to a profound SNHL, AD and a normal sloping to severe SNHL, AS.  A speech reception threshold was obtained at 45 dBHL for the right ear and at 30 dBHL for the left ear.  Speech discrimination testing was 32% at 85 dBHL for the right ear and 56% at 70 dBHL for the left ear.      Tympanometry testing revealed normal tympanograms, AU.  Ipsilateral acoustic reflexes were present at 95 dBHL for 1000 Hz and were absent for 2000 Hz, AD and were present at 95 dBHL for 1000 Hz and 2000 Hz, AS.      Recommendations:  1. Otologic evaluation  2. Annual hearing evaluation  3. Hearing protection when in noise   4. Hearing aid consultation following medical clearance

## 2018-01-09 NOTE — LETTER
January 10, 2018      Rosita Rios MD  [address]  [place] [address]           [facility]  [address]  [place] [address]  Phone: [phone]  Fax: [phone]          Patient: Taran Hernandes   MR Number: [id number]   YOB: 1940   Date of Visit: 1/9/2018       Dear Dr. Rosita Rios:    Thank you for referring Taran Hernandes to me for evaluation. Attached you will find relevant portions of my assessment and plan of care.    If you have questions, please do not hesitate to call me. I look forward to following Taran Hernandes along with you.    Sincerely,    Jim Grant III, MD    Enclosure  CC:  No Recipients    If you would like to receive this communication electronically, please contact [email or web address] or [phone] to request more information on [employer] Link access.    For providers and/or their staff who would like to refer a patient to [facility], please contact us through our one-stop-shop provider referral line, [facility], at [phone].    If you feel you have received this communication in error or would no longer like to receive these types of communications, please e-mail [email or web address]

## 2018-01-09 NOTE — PATIENT INSTRUCTIONS
Audiometry reviewed: AD > AS SNHL  Pt. is a candidate for amplificatoon for one or both ears   copy of audiogram/SIDNEY Silva' card/Rx to obtain hearing aid(s) provided  Hearing protection encouraged prn  MRI brain/IACs ordered; call for results  Monitor hearing yearly   Consider excision of right ear canal cyst re: use of hearing aid ( Dr CECILIA Thompson, Dr. MOOK Vo [phone])
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
## 2018-01-15 ENCOUNTER — HOSPITAL ENCOUNTER (OUTPATIENT)
Dept: RADIOLOGY | Facility: HOSPITAL | Age: 78
Discharge: HOME | End: 2018-01-15
Attending: OTOLARYNGOLOGY
Payer: MEDICARE

## 2018-01-15 PROCEDURE — 25500020 PHARM REV CODE 255: Performed by: OTOLARYNGOLOGY

## 2018-01-15 PROCEDURE — A9585 GADOBUTROL INJECTION: HCPCS | Performed by: OTOLARYNGOLOGY

## 2018-01-15 PROCEDURE — 70553 MRI BRAIN STEM W/O & W/DYE: CPT | Mod: TC

## 2018-01-15 PROCEDURE — 70553 MRI BRAIN STEM W/O & W/DYE: CPT | Mod: 26,,, | Performed by: RADIOLOGY

## 2018-01-15 RX ORDER — GADOBUTROL 604.72 MG/ML
9 INJECTION INTRAVENOUS
Status: COMPLETED | OUTPATIENT
Start: 2018-01-15 | End: 2018-01-15

## 2018-01-15 RX ADMIN — GADOBUTROL 9 ML: 604.72 INJECTION INTRAVENOUS at 08:01

## 2018-02-27 ENCOUNTER — OFFICE VISIT (OUTPATIENT)
Dept: INTERNAL MEDICINE | Facility: CLINIC | Age: 78
End: 2018-02-27
Payer: MEDICARE

## 2018-02-27 VITALS
BODY MASS INDEX: 30.87 KG/M2 | DIASTOLIC BLOOD PRESSURE: 80 MMHG | SYSTOLIC BLOOD PRESSURE: 130 MMHG | HEART RATE: 74 BPM | HEIGHT: 64 IN | WEIGHT: 180.81 LBS

## 2018-02-27 DIAGNOSIS — I10 ESSENTIAL HYPERTENSION: Primary | ICD-10-CM

## 2018-02-27 DIAGNOSIS — E78.5 DYSLIPIDEMIA: ICD-10-CM

## 2018-02-27 DIAGNOSIS — R73.03 PRE-DIABETES: ICD-10-CM

## 2018-02-27 DIAGNOSIS — I70.0 ATHEROSCLEROSIS OF ABDOMINAL AORTA: ICD-10-CM

## 2018-02-27 DIAGNOSIS — N18.30 CKD (CHRONIC KIDNEY DISEASE) STAGE 3, GFR 30-59 ML/MIN: ICD-10-CM

## 2018-02-27 DIAGNOSIS — Z23 NEED FOR PNEUMOCOCCAL VACCINE: ICD-10-CM

## 2018-02-27 DIAGNOSIS — R90.82 WHITE MATTER DISEASE: ICD-10-CM

## 2018-02-27 PROCEDURE — 99499 UNLISTED E&M SERVICE: CPT | Mod: S$GLB,,, | Performed by: INTERNAL MEDICINE

## 2018-02-27 PROCEDURE — 3008F BODY MASS INDEX DOCD: CPT | Mod: S$GLB,,, | Performed by: INTERNAL MEDICINE

## 2018-02-27 PROCEDURE — 99999 PR PBB SHADOW E&M-EST. PATIENT-LVL III: CPT | Mod: PBBFAC,,, | Performed by: INTERNAL MEDICINE

## 2018-02-27 PROCEDURE — 99214 OFFICE O/P EST MOD 30 MIN: CPT | Mod: S$GLB,,, | Performed by: INTERNAL MEDICINE

## 2018-02-27 PROCEDURE — 90732 PPSV23 VACC 2 YRS+ SUBQ/IM: CPT | Mod: S$GLB,,, | Performed by: INTERNAL MEDICINE

## 2018-02-27 PROCEDURE — 1126F AMNT PAIN NOTED NONE PRSNT: CPT | Mod: S$GLB,,, | Performed by: INTERNAL MEDICINE

## 2018-02-27 PROCEDURE — G0009 ADMIN PNEUMOCOCCAL VACCINE: HCPCS | Mod: S$GLB,,, | Performed by: INTERNAL MEDICINE

## 2018-02-27 PROCEDURE — 1159F MED LIST DOCD IN RCRD: CPT | Mod: S$GLB,,, | Performed by: INTERNAL MEDICINE

## 2018-02-27 RX ORDER — ATORVASTATIN CALCIUM 10 MG/1
10 TABLET, FILM COATED ORAL DAILY
Qty: 90 TABLET | Refills: 1 | Status: SHIPPED | OUTPATIENT
Start: 2018-02-27 | End: 2018-08-27 | Stop reason: SDUPTHER

## 2018-02-27 RX ORDER — AMLODIPINE BESYLATE 10 MG/1
TABLET ORAL
Qty: 90 TABLET | Refills: 1 | Status: SHIPPED | OUTPATIENT
Start: 2018-02-27 | End: 2019-01-04 | Stop reason: SDUPTHER

## 2018-02-27 NOTE — PROGRESS NOTES
Subjective:       Patient ID: Taran Hernandes is a 77 y.o. male.    Chief Complaint: Hypertension    Last seen three months ago. Hypertension was not well controlled, increased Losartan from 50 to 100mg daily. Blood chemistry one month later showed stable renal function, Creat 1.4, GFR 48, Potassium 4.1. Has been evaluated by ENT for hearing loss. Brain MRI showed no lesions associated with this symptom, but did not chronic microvascular ischemic changes. Also of note is Aortic Atherosclerosis seen on Abdominal CT in the past. Though lipids are normal, I recommend he restart statin therapy.     PMH:  Hypertension. Stress Echo negative , EF 60%.  Pre-Diabetes, HbA1c 5.8% .  Mild Dyslipidemia, TChol 188, TG 97, HDL 38, .6 Dec. '17.   CKD stage 3, Creat 1.4, GFR 48.   Fatty Liver.  GERD.  Colon Polyp, adenomas.   Hematuria, recurrent UTI, BPH - Cystoscopy 3/10, TRUS with biopsy .  Mild Lumbar DJD/Disc disease.   Diffuse Diverticulosis.  Acute Prostatitis with sepsis 2013.  Acute Pancreatitis Aug. '16.      Colonoscopy 7/15 one tubular adenoma, subepithelial lipoma in rectum. PSA 4.2 , followed by Dr. King/Urology. Eye exam . Flu shot . Tetanus . Pneumovax . Prevnar 6/15.     PSH: Cholecystectomy. Left Ankle Fracture. Bilateral Cataract Extraction.    Social: Never smoked, No alcohol. , 6 children all living, 5 local.  - plans to retire soon and would like to move back to [place].     FMH: Father  age 76 with HTN, DM, Heart disease. Mother  age 82 complications of C-scope, suspected to have Colon cancer. Half brother had osteosarcoma. Half brother had liver cancer, heavy EtOH. Half brother  of a stroke noncompliant with HTN management.     NKDA. No food allergies.     MEDICATIONS: list reviewed and reconciled.           Review of Systems   Constitutional: Negative for activity change, fatigue, fever and unexpected weight change.  "  HENT: Positive for rhinorrhea. Negative for sinus pain, sinus pressure, sore throat and trouble swallowing.         Recent URI - resolved.   Eyes: Negative for pain and visual disturbance.   Respiratory: Negative for cough, chest tightness, shortness of breath and wheezing.    Cardiovascular: Negative for chest pain, palpitations and leg swelling.   Gastrointestinal: Negative for abdominal distention, abdominal pain, blood in stool, constipation, diarrhea and vomiting.   Endocrine: Negative for polydipsia and polyuria.   Genitourinary: Negative for difficulty urinating, hematuria and urgency.   Musculoskeletal: Negative for arthralgias, joint swelling and neck pain.   Skin: Negative for color change and rash.   Neurological: Negative for dizziness, syncope, weakness and headaches.   Psychiatric/Behavioral: Negative for confusion and dysphoric mood.       Objective:    /80, Pulse 74, Ht 5' 4", Wt 181 lbs, BMI=31  Physical Exam   Constitutional: He is oriented to person, place, and time. He appears well-developed and well-nourished. No distress.   HENT:   Nose: Nose normal.   Mouth/Throat: Oropharynx is clear and moist.   Cardiovascular: Normal rate, regular rhythm and normal heart sounds.    Pulmonary/Chest: Effort normal and breath sounds normal. No respiratory distress. He has no wheezes. He has no rales.   Musculoskeletal: Normal range of motion. He exhibits no edema.   Neurological: He is alert and oriented to person, place, and time. No cranial nerve deficit. Coordination normal.   Skin: Skin is warm and dry. He is not diaphoretic.   Psychiatric: He has a normal mood and affect. His behavior is normal.       Assessment:       1. Essential hypertension    2. Pre-diabetes    3. Dyslipidemia    4. Atherosclerosis of abdominal aorta    5. White matter disease    6. CKD (chronic kidney disease) stage 3, GFR 30-59 ml/min        Plan:       Essential hypertension controlled        -     Continue Losartan 100mg " daily  -     Continue AmLODIPine (NORVASC) 10 MG tablet; TAKE 1 TABLET(10 MG) BY MOUTH EVERY DAY  Dispense: 90 tablet; Refill: 1  -     CBC auto differential  -     Comprehensive metabolic panel    Pre-diabetes  -     Hemoglobin A1c; Future    Dyslipidemia  -     Start Atorvastatin (LIPITOR) 10 MG tablet; Take 1 tablet (10 mg total) by mouth once daily.  Dispense: 90 tablet; Refill: 1  -     Lipid panel    Atherosclerosis of abdominal aorta  -     Start Atorvastatin (LIPITOR) 10 MG tablet; Take 1 tablet (10 mg total) by mouth once daily.  Dispense: 90 tablet; Refill: 1    White matter disease  -     Start Atorvastatin (LIPITOR) 10 MG tablet; Take 1 tablet (10 mg total) by mouth once daily.  Dispense: 90 tablet; Refill: 1    CKD (chronic kidney disease) stage 3, GFR 30-59 ml/min - stable.     Need for pneumococcal vaccine  -     (In Office Administered) Pneumococcal Polysaccharide Vaccine (23 Valent) (SQ/IM)    Labs above in 2 months.

## 2018-04-26 ENCOUNTER — LAB VISIT (OUTPATIENT)
Dept: LAB | Facility: HOSPITAL | Age: 78
End: 2018-04-26
Attending: INTERNAL MEDICINE
Payer: MEDICARE

## 2018-04-26 DIAGNOSIS — E78.5 DYSLIPIDEMIA: ICD-10-CM

## 2018-04-26 DIAGNOSIS — I10 ESSENTIAL HYPERTENSION: ICD-10-CM

## 2018-04-26 DIAGNOSIS — R73.03 PRE-DIABETES: ICD-10-CM

## 2018-04-26 LAB
ALBUMIN SERPL BCP-MCNC: 4.1 G/DL
ALP SERPL-CCNC: 57 U/L
ALT SERPL W/O P-5'-P-CCNC: 85 U/L
ANION GAP SERPL CALC-SCNC: 8 MMOL/L
AST SERPL-CCNC: 84 U/L
BASOPHILS # BLD AUTO: 0.05 K/UL
BASOPHILS NFR BLD: 0.8 %
BILIRUB SERPL-MCNC: 0.8 MG/DL
BUN SERPL-MCNC: 19 MG/DL
CALCIUM SERPL-MCNC: 9.5 MG/DL
CHLORIDE SERPL-SCNC: 109 MMOL/L
CHOLEST SERPL-MCNC: 131 MG/DL
CHOLEST/HDLC SERPL: 3.6 {RATIO}
CO2 SERPL-SCNC: 26 MMOL/L
CREAT SERPL-MCNC: 1.4 MG/DL
DIFFERENTIAL METHOD: NORMAL
EOSINOPHIL # BLD AUTO: 0.2 K/UL
EOSINOPHIL NFR BLD: 3.6 %
ERYTHROCYTE [DISTWIDTH] IN BLOOD BY AUTOMATED COUNT: 13.6 %
EST. GFR  (AFRICAN AMERICAN): 55 ML/MIN/1.73 M^2
EST. GFR  (NON AFRICAN AMERICAN): 48 ML/MIN/1.73 M^2
ESTIMATED AVG GLUCOSE: 117 MG/DL
GLUCOSE SERPL-MCNC: 98 MG/DL
HBA1C MFR BLD HPLC: 5.7 %
HCT VFR BLD AUTO: 41.3 %
HDLC SERPL-MCNC: 36 MG/DL
HDLC SERPL: 27.5 %
HGB BLD-MCNC: 14 G/DL
LDLC SERPL CALC-MCNC: 83.2 MG/DL
LYMPHOCYTES # BLD AUTO: 2.4 K/UL
LYMPHOCYTES NFR BLD: 39.7 %
MCH RBC QN AUTO: 29.3 PG
MCHC RBC AUTO-ENTMCNC: 33.9 G/DL
MCV RBC AUTO: 86 FL
MONOCYTES # BLD AUTO: 0.6 K/UL
MONOCYTES NFR BLD: 9.6 %
NEUTROPHILS # BLD AUTO: 2.8 K/UL
NEUTROPHILS NFR BLD: 45.6 %
NONHDLC SERPL-MCNC: 95 MG/DL
PLATELET # BLD AUTO: 208 K/UL
PMV BLD AUTO: 9.7 FL
POTASSIUM SERPL-SCNC: 4.2 MMOL/L
PROT SERPL-MCNC: 7.9 G/DL
RBC # BLD AUTO: 4.78 M/UL
SODIUM SERPL-SCNC: 143 MMOL/L
TRIGL SERPL-MCNC: 59 MG/DL
WBC # BLD AUTO: 6.12 K/UL

## 2018-04-26 PROCEDURE — 83036 HEMOGLOBIN GLYCOSYLATED A1C: CPT

## 2018-04-26 PROCEDURE — 80061 LIPID PANEL: CPT

## 2018-04-26 PROCEDURE — 36415 COLL VENOUS BLD VENIPUNCTURE: CPT

## 2018-04-26 PROCEDURE — 80053 COMPREHEN METABOLIC PANEL: CPT

## 2018-04-26 PROCEDURE — 85025 COMPLETE CBC W/AUTO DIFF WBC: CPT

## 2018-05-06 ENCOUNTER — PATIENT MESSAGE (OUTPATIENT)
Dept: INTERNAL MEDICINE | Facility: CLINIC | Age: 78
End: 2018-05-06

## 2018-06-10 DIAGNOSIS — I10 ESSENTIAL HYPERTENSION: ICD-10-CM

## 2018-06-10 RX ORDER — LOSARTAN POTASSIUM 100 MG/1
TABLET ORAL
Qty: 90 TABLET | Refills: 1 | Status: SHIPPED | OUTPATIENT
Start: 2018-06-10 | End: 2019-03-27 | Stop reason: SDUPTHER

## 2018-08-27 ENCOUNTER — PES CALL (OUTPATIENT)
Dept: ADMINISTRATIVE | Facility: CLINIC | Age: 78
End: 2018-08-27

## 2018-08-27 DIAGNOSIS — E78.5 DYSLIPIDEMIA: ICD-10-CM

## 2018-08-27 DIAGNOSIS — R90.82 WHITE MATTER DISEASE: ICD-10-CM

## 2018-08-27 DIAGNOSIS — I70.0 ATHEROSCLEROSIS OF ABDOMINAL AORTA: ICD-10-CM

## 2018-08-28 RX ORDER — ATORVASTATIN CALCIUM 10 MG/1
TABLET, FILM COATED ORAL
Qty: 90 TABLET | Refills: 0 | OUTPATIENT
Start: 2018-08-28

## 2018-08-28 RX ORDER — ATORVASTATIN CALCIUM 10 MG/1
TABLET, FILM COATED ORAL
Qty: 90 TABLET | Refills: 2 | Status: SHIPPED | OUTPATIENT
Start: 2018-08-28 | End: 2019-01-10 | Stop reason: SDUPTHER

## 2018-10-05 ENCOUNTER — PES CALL (OUTPATIENT)
Dept: ADMINISTRATIVE | Facility: CLINIC | Age: 78
End: 2018-10-05

## 2018-10-30 ENCOUNTER — PES CALL (OUTPATIENT)
Dept: ADMINISTRATIVE | Facility: CLINIC | Age: 78
End: 2018-10-30

## 2019-01-04 DIAGNOSIS — I10 ESSENTIAL HYPERTENSION: ICD-10-CM

## 2019-01-04 RX ORDER — AMLODIPINE BESYLATE 10 MG/1
TABLET ORAL
Qty: 90 TABLET | Refills: 0 | Status: SHIPPED | OUTPATIENT
Start: 2019-01-04 | End: 2019-03-27 | Stop reason: SDUPTHER

## 2019-01-07 RX ORDER — TAMSULOSIN HYDROCHLORIDE 0.4 MG/1
CAPSULE ORAL
Qty: 30 CAPSULE | Refills: 0 | OUTPATIENT
Start: 2019-01-07

## 2019-01-10 DIAGNOSIS — E78.5 DYSLIPIDEMIA: ICD-10-CM

## 2019-01-10 DIAGNOSIS — I70.0 ATHEROSCLEROSIS OF ABDOMINAL AORTA: ICD-10-CM

## 2019-01-10 DIAGNOSIS — R90.82 WHITE MATTER DISEASE: ICD-10-CM

## 2019-01-10 RX ORDER — ATORVASTATIN CALCIUM 10 MG/1
10 TABLET, FILM COATED ORAL DAILY
Qty: 90 TABLET | Refills: 1 | Status: SHIPPED | OUTPATIENT
Start: 2019-01-10 | End: 2019-03-27 | Stop reason: SDUPTHER

## 2019-01-14 ENCOUNTER — OFFICE VISIT (OUTPATIENT)
Dept: UROLOGY | Facility: CLINIC | Age: 79
End: 2019-01-14
Payer: MEDICARE

## 2019-01-14 VITALS
BODY MASS INDEX: 30.86 KG/M2 | WEIGHT: 180.75 LBS | HEART RATE: 83 BPM | HEIGHT: 64 IN | SYSTOLIC BLOOD PRESSURE: 132 MMHG | DIASTOLIC BLOOD PRESSURE: 76 MMHG

## 2019-01-14 DIAGNOSIS — N40.1 BPH WITH URINARY OBSTRUCTION: Primary | ICD-10-CM

## 2019-01-14 DIAGNOSIS — N13.8 BPH WITH URINARY OBSTRUCTION: Primary | ICD-10-CM

## 2019-01-14 DIAGNOSIS — R35.0 URINARY FREQUENCY: ICD-10-CM

## 2019-01-14 LAB
BILIRUB SERPL-MCNC: ABNORMAL MG/DL
BLOOD URINE, POC: ABNORMAL
COLOR, POC UA: ABNORMAL
GLUCOSE UR QL STRIP: ABNORMAL
KETONES UR QL STRIP: ABNORMAL
LEUKOCYTE ESTERASE URINE, POC: ABNORMAL
NITRITE, POC UA: ABNORMAL
PH, POC UA: 5
POC RESIDUAL URINE VOLUME: 3 ML (ref 0–100)
PROTEIN, POC: 50
SPECIFIC GRAVITY, POC UA: 1.01
UROBILINOGEN, POC UA: ABNORMAL

## 2019-01-14 PROCEDURE — 3078F PR MOST RECENT DIASTOLIC BLOOD PRESSURE < 80 MM HG: ICD-10-PCS | Mod: CPTII,HCNC,S$GLB, | Performed by: NURSE PRACTITIONER

## 2019-01-14 PROCEDURE — 51798 US URINE CAPACITY MEASURE: CPT | Mod: HCNC,S$GLB,, | Performed by: NURSE PRACTITIONER

## 2019-01-14 PROCEDURE — 99214 OFFICE O/P EST MOD 30 MIN: CPT | Mod: 25,HCNC,S$GLB, | Performed by: NURSE PRACTITIONER

## 2019-01-14 PROCEDURE — 3078F DIAST BP <80 MM HG: CPT | Mod: CPTII,HCNC,S$GLB, | Performed by: NURSE PRACTITIONER

## 2019-01-14 PROCEDURE — 3075F PR MOST RECENT SYSTOLIC BLOOD PRESS GE 130-139MM HG: ICD-10-PCS | Mod: CPTII,HCNC,S$GLB, | Performed by: NURSE PRACTITIONER

## 2019-01-14 PROCEDURE — 81002 POCT URINE DIPSTICK WITHOUT MICROSCOPE: ICD-10-PCS | Mod: HCNC,S$GLB,, | Performed by: NURSE PRACTITIONER

## 2019-01-14 PROCEDURE — 3075F SYST BP GE 130 - 139MM HG: CPT | Mod: CPTII,HCNC,S$GLB, | Performed by: NURSE PRACTITIONER

## 2019-01-14 PROCEDURE — 99999 PR PBB SHADOW E&M-EST. PATIENT-LVL IV: CPT | Mod: PBBFAC,HCNC,, | Performed by: NURSE PRACTITIONER

## 2019-01-14 PROCEDURE — 99214 PR OFFICE/OUTPT VISIT, EST, LEVL IV, 30-39 MIN: ICD-10-PCS | Mod: 25,HCNC,S$GLB, | Performed by: NURSE PRACTITIONER

## 2019-01-14 PROCEDURE — 51798 POCT BLADDER SCAN: ICD-10-PCS | Mod: HCNC,S$GLB,, | Performed by: NURSE PRACTITIONER

## 2019-01-14 PROCEDURE — 1101F PR PT FALLS ASSESS DOC 0-1 FALLS W/OUT INJ PAST YR: ICD-10-PCS | Mod: CPTII,HCNC,S$GLB, | Performed by: NURSE PRACTITIONER

## 2019-01-14 PROCEDURE — 1101F PT FALLS ASSESS-DOCD LE1/YR: CPT | Mod: CPTII,HCNC,S$GLB, | Performed by: NURSE PRACTITIONER

## 2019-01-14 PROCEDURE — 81002 URINALYSIS NONAUTO W/O SCOPE: CPT | Mod: HCNC,S$GLB,, | Performed by: NURSE PRACTITIONER

## 2019-01-14 PROCEDURE — 99999 PR PBB SHADOW E&M-EST. PATIENT-LVL IV: ICD-10-PCS | Mod: PBBFAC,HCNC,, | Performed by: NURSE PRACTITIONER

## 2019-01-14 RX ORDER — TROSPIUM CHLORIDE 20 MG/1
20 TABLET, FILM COATED ORAL 2 TIMES DAILY
Qty: 60 TABLET | Refills: 11 | Status: SHIPPED | OUTPATIENT
Start: 2019-01-14 | End: 2019-01-18 | Stop reason: ALTCHOICE

## 2019-01-14 NOTE — LETTER
January 14, 2019      Rosita Rios MD  [address]  [facility] [address]           [facility] - Urology 4th Floor  [address]  [facility] [address]  Phone: [phone]          Patient: Taran Hernandes   MR Number: [id number]   YOB: 1940   Date of Visit: 1/14/2019       Dear Dr. Rosita Rios:    Thank you for referring Taran Hernandes to me for evaluation. Attached you will find relevant portions of my assessment and plan of care.    If you have questions, please do not hesitate to call me. I look forward to following Taran Hernandes along with you.    Sincerely,    Madonna Aguilar, NP    Enclosure  CC:  No Recipients    If you would like to receive this communication electronically, please contact [email or web address] or [phone] to request more information on [employer] Link access.    For providers and/or their staff who would like to refer a patient to [facility], please contact us through our one-stop-shop provider referral line, Pauline Long, at [phone].    If you feel you have received this communication in error or would no longer like to receive these types of communications, please e-mail [email or web address]

## 2019-01-14 NOTE — PATIENT INSTRUCTIONS
BPH (Enlarged Prostate)  The prostate is a gland at the base of the bladder. As some men get older, the prostate may get bigger in size. This problem is called benign prostatic hyperplasia (BPH). BPH puts pressure on the urethra. This is the tube that carries urine from the bladder to the penis. It may interfere with the flow of urine. It may also keep the bladder from emptying fully.    Symptoms of BPH include trouble starting urination and feeling as though the bladder isnt emptying all the way. It also includes a weak urine stream, dribbling and leaking of urine, and frequent and urgent urination (especially at night). BPH can increase the risk of urinary infections. It can also block off urine flow completely. If this occurs, a thin tube (catheter) may be passed into the bladder to help drain urine.  If symptoms are mild, no treatment may be needed right now. If symptoms are more severe, treatment is likely needed. The goal of treatment is to improve urine flow and reduce symptoms. Treatments can include medicine and procedures. Your healthcare provider will discuss treatment options with you as needed.  Home care  The following guidelines will help you care for yourself at home:  · Urinate as soon as you feel the urge. Don't try to hold your urine.  · Don't limit your fluid intake during the day. Drink 6 to 8 glasses of water or liquids a day. This prevents bacteria from building up in the bladder.  · Avoid drinking fluids after dinner to help reduce urination during the night.  · Avoid medicines that can worsen your symptoms. These include certain cold and allergy medicines and antidepressants. Diuretics used for high blood pressure can also worsen symptoms. Talk to your doctor about the medicines you take. Other choices may work better for you.  Prostate cancer screening  BPH does not increase the risk of prostate cancer. But because prostate cancer is a common cancer in men, screening is sometimes  recommended. This may help detect the cancer in its early stages when treatment is most effective. Factors that can increase the risk of prostate cancer include being -American or having a father or brother who had prostate cancer. A high-fat diet may also increase the risk of prostate cancer. Talk to your healthcare provider to see whether you should be screened for prostate cancer.  Follow-up care  Follow up with your healthcare provider, or as advised  To learn more, go to:  · National Kidney & Urologic Diseases Information Clearinghouse  kidney.niddk.nih.gov, 799.909.9806  When to seek medical advice  Call your healthcare provider right away if any of these occur:  · Fever of 100.4°F (38.0°C) or higher, or as advised  · Unable to pass urine for 8 hours  · Increasing pressure or pain in your bladder (lower abdomen)  · Blood in the urine  · Increasing low back pain, not related to injury  · Symptoms of urinary infection (increased urge to urinate, burning when passing urine, foul-smelling urine)  Date Last Reviewed: 7/1/2016  © 1788-5131 [employer]. [address] [place] [address]. All rights reserved. This information is not intended as a substitute for professional medical care. Always follow your healthcare professional's instructions.        Prostate Problems and Related Urinary Symptoms    Many men have problems with the prostate at some time in their lives. The prostate gland is part of the male reproductive system. Its located just below the bladder. The prostate surrounds the urethra (the tube that carries urine and semen out of the body). The main function of the prostate gland is to add fluid to the semen. When problems occur in the prostate, the bladder and urethra are often affected as well. The most common prostate problems are described below.  BPH  BPH (benign prostatic hyperplasia) develops when changing hormone levels cause the prostate to grow larger. This often  starts around age 50. Excess tissue can block the urethra, making it harder for urine to flow. The enlarged prostate can also press on the bladder, so you may need to urinate more often. Other symptoms include straining during urination, a weak urine stream, urinating more at night, incontinence, dribbling at the end of urination, and feeling that the bladder isnt emptying all the way. Note that BPH is not cancer and does not cause cancer.  How BPH affects the bladder  Pushing to urinate through a narrowed urethra can cause the bladder walls to thicken or stretch out of shape. A stretched bladder may have problems emptying all the way. If urine stays in the bladder longer than it should, you may develop an infection or bladder stones. Also, the kidneys cant drain properly into a bladder that doesnt empty completely. This can lead to kidney failure. Pressure from urine buildup can also cause leaking of urine (called overflow incontinence).  Other prostate problems  · Prostatitis is an infection or inflammation that causes the prostate to become painful and swollen. The swelling narrows the urethra and can block the bladder neck. Prostatitis can cause a burning sensation during urination. You may also feel pressure or pain in the genital area. In some cases, prostatitis can cause fever and chills, and can make you very sick.  · Cancer occurs when abnormal cells form a tumor (a lump of cells that grow uncontrolled). Some tumors can be felt during a physical exam, others cant. Prostate cancer often causes no symptoms at all, especially in its early stages. Prostate symptoms are more likely to be caused by a problem that is NOT cancer.   Date Last Reviewed: 1/1/2017 © 2000-2017 [employer]. [address] [place] [address], [place] [address]. All rights reserved. This information is not intended as a substitute for professional medical care. Always follow your healthcare professional's  instructions.        Overactive Bladder Syndrome (OAB)     Normally, urine stays in the bladder until a person decides to release it. With OAB, the bladder muscles contract involuntarily, causing a sudden urge to urinate and even urine leakage.   When the bladder muscle contract or squeeze involuntarily, it is called overactive bladder syndrome. This causes an intense urge to urinate, known as urgency. Urgency can occur many times during the day and night. If urine leaks with the urgency, it is called urge incontinence.  A disease that affects the bladder nerves, such as multiple sclerosis, can cause overactive bladder syndrome. Other conditions, such as urinary tract infection (UTI) or prostate problems in men, can also lead to OAB. But the exact cause is often not known.  How is overactive bladder syndrome diagnosed?  Your healthcare provider will examine you and ask about your symptoms and health history. You may also have one or more of the following:  · Urine test to take samples of urine and have them checked for problems.  · Urinary diary to record how much fluid you take in and urinate out in a 3 day period.  · Bladder ultrasound to study the bladder as it empties. Ultrasound uses sound waves to create detailed images of the inside of the body.  · Cystoscopy to allow the healthcare provider to look for problems in the urinary tract. The test uses a thin, flexible scope called a cystoscope with a light and camera on the end. The scope is inserted into the urethra (the tube that carries urine out of the body).  · Urodynamic studies, a battery of tests designed to measure and record many aspects of urinary bladder function, including pressures, volume, and urine flow.  How is overactive bladder syndrome treated?  Treatment depends on the cause and severity of your OAB. Treatments may include the following:  · Changing urination habits may be suggested. For instance, your healthcare provider may suggest that you  urinate as soon as you feel the urge. You may also need to limit how much fluid you have during the day.  · Exercising your pelvic muscles can help strengthen muscles used during urination. These exercises are called Kegels. They involve calvin as if you were stopping your urine stream and tightening your rectum as if trying not to pass gas. Your healthcare provider can help you learn how to do Kegels.  · Biofeedback to help you learn to control the movement of your bladder muscles. Sensors are placed on your abdomen. They turn signals given off by your muscles into lines on a computer screen.  · Medicine may be given to relax the bladder muscle. Medicine can also help ease bladder contractions, which reduces the urge to urinate.  · Neuromodulation may be done if medicine and behavioral changes dont work. Electrical pulses are sent to the sacral nerves (nerves that affect the pelvic area). These pulses help relieve OAB and urge incontinence.  · Surgery to make the bladder larger may be done in severe cases.  With treatment, OAB can be managed. A condition, such as UTI, that has caused you to have OAB will be treated. Treatment may involve taking medicine for months or years. You may also need to make changes in your daily routine. This may include going to the bathroom more often than you think you need to. Or, you may need to cut back on caffeine and alcohol because these can make symptoms worse. Your healthcare provider can tell you more.     When to call your healthcare provider  Call the healthcare provider right away if you have any of the following:  · Fever of 100.4°F (38.0 °C) or higher   · No improvement with treatment  · Trouble urinating because of pain  · Back or abdominal pain   Date Last Reviewed: 1/1/2017  © 1664-5436 [employer]. [address] [place] [address], [place] [address]. All rights reserved. This information is not intended as a substitute for professional medical care. Always  follow your healthcare professional's instructions.

## 2019-01-14 NOTE — PROGRESS NOTES
Subjective:       Patient ID: Taran Hernandes is a 78 y.o. male.    Chief Complaint: Medication Refill (flomax) and Nocturia (x5-7)    Taran Hernandes is a 78 y.o. male with a history of an elevated PSA s/p a negative TRUS bx in 2013 when his PSA was 4.6.    He was last seen in clinic with Dr. King 08/02/2017     He c/o LUTS.     He was restarted on flomax.  This improved his symptoms somewhat.    He now c/o nocturia x 6-8.  + hesitancy.  + intermittency.  + decreased FOS.       He denies ED.                            PSA                      4.2 (H)             07/20/2016                 PSA                      4.9 (H)             06/03/2015                 PSA                      5.4 (H)             05/27/2014                 PSA                      4.65 (H)            12/11/2012                 PSA                      3.03                12/06/2011                 PSA                      1.9                 12/08/2010                 PSA                      2.3                 01/14/2009                 PSA                      2.4                 12/14/2007                 PSA                      2.3                 11/04/2004                    Review of Systems   Constitutional: Negative for activity change, appetite change, chills and fever.   HENT: Negative for facial swelling and trouble swallowing.    Eyes: Negative for visual disturbance.   Respiratory: Negative for chest tightness and shortness of breath.    Cardiovascular: Negative for chest pain and palpitations.   Gastrointestinal: Negative.  Negative for abdominal pain, constipation, diarrhea, nausea and vomiting.   Genitourinary: Positive for frequency, nocturia and urgency. Negative for difficulty urinating, dysuria, flank pain, hematuria, penile pain, penile swelling, scrotal swelling and testicular pain.   Musculoskeletal: Negative for back pain, gait problem, myalgias and neck stiffness.   Skin: Negative for rash.   Neurological:  Negative for dizziness and speech difficulty.   Hematological: Does not bruise/bleed easily.   Psychiatric/Behavioral: Negative for behavioral problems.       Objective:      Physical Exam   Nursing note and vitals reviewed.  Constitutional: He is oriented to person, place, and time. He appears well-developed and well-nourished.  Non-toxic appearance. He does not have a sickly appearance.   Urine dipped clear of infection.  PVR in the office today by the nurse was 3.     HENT:   Head: Normocephalic and atraumatic.   Right Ear: External ear normal.   Left Ear: External ear normal.   Nose: Nose normal.   Mouth/Throat: Mucous membranes are normal.   Eyes: Conjunctivae and lids are normal. No scleral icterus.   Neck: Trachea normal, normal range of motion and full passive range of motion without pain. Neck supple. No JVD present. No tracheal deviation present.   Cardiovascular: Normal rate, S1 normal and S2 normal.    Pulmonary/Chest: Effort normal. No respiratory distress. He exhibits no tenderness.   Abdominal: Soft. Normal appearance and bowel sounds are normal. There is no hepatosplenomegaly. There is no tenderness. There is no CVA tenderness.   Genitourinary: Rectum normal, testes normal and penis normal. Rectal exam shows no external hemorrhoid, no mass and no tenderness. Prostate is enlarged. Prostate is not tender. Right testis shows no mass, no swelling and no tenderness. Left testis shows no mass, no swelling and no tenderness. Uncircumcised. No phimosis, paraphimosis, hypospadias, penile erythema or penile tenderness. No discharge found.       Musculoskeletal: Normal range of motion.   Lymphadenopathy: No inguinal adenopathy noted on the right or left side.   Neurological: He is alert and oriented to person, place, and time. He has normal strength.   Skin: Skin is warm, dry and intact.     Psychiatric: He has a normal mood and affect. His behavior is normal. Judgment and thought content normal.        Assessment:       1. BPH with urinary obstruction    2. Urinary frequency        Plan:         I spent 25 minutes with the patient of which more than half was spent in direct consultation with the patient in regards to our treatment and plan.  5  Education and recommendations of today's plan of care including home remedies.  We discussed his LUTS and contributory factors  Discussed office findings.  Continue the Flomax  Discussed adding anticholinergic (benefits,risks,se)  Add Trospuim 20mg BID; can start with just one to see if improves; take in the evening.  RTC 3 months to check LUTS

## 2019-01-15 RX ORDER — TAMSULOSIN HYDROCHLORIDE 0.4 MG/1
CAPSULE ORAL
Qty: 30 CAPSULE | Refills: 0 | OUTPATIENT
Start: 2019-01-15

## 2019-01-18 DIAGNOSIS — R35.0 URINARY FREQUENCY: Primary | ICD-10-CM

## 2019-01-18 RX ORDER — OXYBUTYNIN CHLORIDE 5 MG/1
5 TABLET, EXTENDED RELEASE ORAL DAILY
Qty: 30 TABLET | Refills: 11 | Status: SHIPPED | OUTPATIENT
Start: 2019-01-18 | End: 2019-03-27

## 2019-01-18 NOTE — PROGRESS NOTES
Seen for urinary frequency  Trospium was not covered.  Rx for oxybutynin 5mg ER sent to pharmacy

## 2019-01-20 ENCOUNTER — PATIENT MESSAGE (OUTPATIENT)
Dept: UROLOGY | Facility: CLINIC | Age: 79
End: 2019-01-20

## 2019-01-21 DIAGNOSIS — R35.0 URINARY FREQUENCY: ICD-10-CM

## 2019-01-21 DIAGNOSIS — N13.8 BPH WITH URINARY OBSTRUCTION: Primary | ICD-10-CM

## 2019-01-21 DIAGNOSIS — N40.1 BPH WITH URINARY OBSTRUCTION: Primary | ICD-10-CM

## 2019-01-21 RX ORDER — TAMSULOSIN HYDROCHLORIDE 0.4 MG/1
0.4 CAPSULE ORAL DAILY
Qty: 90 CAPSULE | Refills: 3 | Status: SHIPPED | OUTPATIENT
Start: 2019-01-21 | End: 2019-04-16 | Stop reason: SDUPTHER

## 2019-03-22 ENCOUNTER — OFFICE VISIT (OUTPATIENT)
Dept: INTERNAL MEDICINE | Facility: CLINIC | Age: 79
End: 2019-03-22
Payer: MEDICARE

## 2019-03-22 VITALS
BODY MASS INDEX: 29.96 KG/M2 | HEIGHT: 64 IN | DIASTOLIC BLOOD PRESSURE: 64 MMHG | OXYGEN SATURATION: 96 % | SYSTOLIC BLOOD PRESSURE: 112 MMHG | WEIGHT: 175.5 LBS | HEART RATE: 70 BPM

## 2019-03-22 DIAGNOSIS — M51.36 DDD (DEGENERATIVE DISC DISEASE), LUMBAR: ICD-10-CM

## 2019-03-22 DIAGNOSIS — I70.0 ATHEROSCLEROSIS OF ABDOMINAL AORTA: ICD-10-CM

## 2019-03-22 DIAGNOSIS — N18.30 CKD (CHRONIC KIDNEY DISEASE), STAGE III: ICD-10-CM

## 2019-03-22 DIAGNOSIS — Z00.00 ENCOUNTER FOR PREVENTIVE HEALTH EXAMINATION: Primary | ICD-10-CM

## 2019-03-22 DIAGNOSIS — G57.61 MORTON'S NEUROMA OF THIRD INTERSPACE OF RIGHT FOOT: ICD-10-CM

## 2019-03-22 DIAGNOSIS — E55.9 MILD VITAMIN D DEFICIENCY: ICD-10-CM

## 2019-03-22 PROCEDURE — 99499 UNLISTED E&M SERVICE: CPT | Mod: HCNC,S$GLB,, | Performed by: NURSE PRACTITIONER

## 2019-03-22 PROCEDURE — 99499 RISK ADDL DX/OHS AUDIT: ICD-10-PCS | Mod: HCNC,S$GLB,, | Performed by: NURSE PRACTITIONER

## 2019-03-22 PROCEDURE — 99999 PR PBB SHADOW E&M-EST. PATIENT-LVL IV: ICD-10-PCS | Mod: PBBFAC,HCNC,, | Performed by: NURSE PRACTITIONER

## 2019-03-22 PROCEDURE — 3074F PR MOST RECENT SYSTOLIC BLOOD PRESSURE < 130 MM HG: ICD-10-PCS | Mod: HCNC,CPTII,S$GLB, | Performed by: NURSE PRACTITIONER

## 2019-03-22 PROCEDURE — G0439 PPPS, SUBSEQ VISIT: HCPCS | Mod: HCNC,S$GLB,, | Performed by: NURSE PRACTITIONER

## 2019-03-22 PROCEDURE — 3074F SYST BP LT 130 MM HG: CPT | Mod: HCNC,CPTII,S$GLB, | Performed by: NURSE PRACTITIONER

## 2019-03-22 PROCEDURE — 3078F PR MOST RECENT DIASTOLIC BLOOD PRESSURE < 80 MM HG: ICD-10-PCS | Mod: HCNC,CPTII,S$GLB, | Performed by: NURSE PRACTITIONER

## 2019-03-22 PROCEDURE — 3078F DIAST BP <80 MM HG: CPT | Mod: HCNC,CPTII,S$GLB, | Performed by: NURSE PRACTITIONER

## 2019-03-22 PROCEDURE — 99999 PR PBB SHADOW E&M-EST. PATIENT-LVL IV: CPT | Mod: PBBFAC,HCNC,, | Performed by: NURSE PRACTITIONER

## 2019-03-22 PROCEDURE — G0439 PR MEDICARE ANNUAL WELLNESS SUBSEQUENT VISIT: ICD-10-PCS | Mod: HCNC,S$GLB,, | Performed by: NURSE PRACTITIONER

## 2019-03-22 NOTE — PROGRESS NOTES
"Taran Hernandes presented for a  Medicare AWV and comprehensive Health Risk Assessment today. The following components were reviewed and updated:    · Medical history  · Family History  · Social history  · Allergies and Current Medications  · Health Risk Assessment  · Health Maintenance  · Care Team     ** See Completed Assessments for Annual Wellness Visit within the encounter summary.**       The following assessments were completed:  · Living Situation  · CAGE  · Depression Screening  · Timed Get Up and Go  · Whisper Test  · Cognitive Function Screening  ·   ·   · Nutrition Screening  · ADL Screening  · PAQ Screening    Vitals:    03/22/19 1644   BP: 112/64   Pulse: 70   SpO2: 96%   Weight: 79.6 kg (175 lb 7.8 oz)   Height: 5' 4" (1.626 m)     Body mass index is 30.12 kg/m².  Physical Exam   Constitutional: He is oriented to person, place, and time. He appears well-developed.   HENT:   Head: Normocephalic and atraumatic.   Nose: Nose normal.   Eyes: Conjunctivae and EOM are normal.   Cardiovascular: Normal rate, regular rhythm, normal heart sounds and intact distal pulses.   Pulmonary/Chest: Effort normal and breath sounds normal.   Musculoskeletal: Normal range of motion.   Neurological: He is alert and oriented to person, place, and time.   Skin: Skin is warm and dry.   Psychiatric: He has a normal mood and affect. His behavior is normal. Judgment and thought content normal.   Nursing note and vitals reviewed.        Diagnoses and health risks identified today and associated recommendations/orders:    1. Encounter for preventive health examination  Assessment performed. Health maintenance updated. Chart review completed.    2. Atherosclerosis of abdominal aorta  Noted on imaging. Stable on current regimen. Followed by PCP.    3. CKD (chronic kidney disease), stage III  Chronic. Stable. Followed by PCP.  Component      Latest Ref Rng & Units 4/26/2018   Creatinine      0.5 - 1.4 mg/dL 1.4     4. 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 Stable. Followed by Neurology.    5. DDD (degenerative disc disease), lumbar  Chronic. Stable. Followed by Orthopedics.    6. Mild vitamin D deficiency  Stable.     7. BMI 30.0-30.9,adult  Chronic. Stable. Continue current exercise regimen. Followed by PCP.      Provided Taran with a 5-10 year written screening schedule and personal prevention plan. Recommendations were developed using the USPSTF age appropriate recommendations. Education, counseling, and referrals were provided as needed. After Visit Summary printed and given to patient which includes a list of additional screenings\tests needed.    Follow-up for Annual Wellness Visit in 1 year, follow up with Primary Care Provider as instructed, ;sooner if prob.    SEAN Fisher

## 2019-03-22 NOTE — PATIENT INSTRUCTIONS
Counseling and Referral of Other Preventative  (Italic type indicates deductible and co-insurance are waived)    Patient Name: Taran Hernandes  Today's Date: 3/22/2019    Health Maintenance       Date Due Completion Date    TETANUS VACCINE 11/27/1957 ---    Zoster Vaccine 11/27/1999 ---    Colonoscopy 07/17/2020 7/17/2015    Lipid Panel 04/26/2023 4/26/2018        No orders of the defined types were placed in this encounter.    The following information is provided to all patients.  This information is to help you find resources for any of the problems found today that may be affecting your health:                Living healthy guide: www.[facility]      Understanding Diabetes: www.diabetes.org      Eating healthy: www.cdc.gov/healthyweight      CDC home safety checklist: www.cdc.gov/steadi/patient.html      Agency on Aging: www.goea.[place].[facility]      Alcoholics anonymous (AA): www.aa.org      Physical Activity: www.christina.nih.gov/qn4jkzm      Tobacco use: www.quitwithusla.org

## 2019-03-27 ENCOUNTER — IMMUNIZATION (OUTPATIENT)
Dept: PHARMACY | Facility: CLINIC | Age: 79
End: 2019-03-27
Payer: MEDICARE

## 2019-03-27 ENCOUNTER — OFFICE VISIT (OUTPATIENT)
Dept: INTERNAL MEDICINE | Facility: CLINIC | Age: 79
End: 2019-03-27
Payer: MEDICARE

## 2019-03-27 VITALS
HEART RATE: 75 BPM | WEIGHT: 174.19 LBS | HEIGHT: 64 IN | DIASTOLIC BLOOD PRESSURE: 76 MMHG | BODY MASS INDEX: 29.74 KG/M2 | SYSTOLIC BLOOD PRESSURE: 122 MMHG

## 2019-03-27 DIAGNOSIS — K21.9 GASTROESOPHAGEAL REFLUX DISEASE, ESOPHAGITIS PRESENCE NOT SPECIFIED: ICD-10-CM

## 2019-03-27 DIAGNOSIS — I70.0 ATHEROSCLEROSIS OF ABDOMINAL AORTA: ICD-10-CM

## 2019-03-27 DIAGNOSIS — R73.03 PRE-DIABETES: ICD-10-CM

## 2019-03-27 DIAGNOSIS — K76.0 FATTY LIVER: ICD-10-CM

## 2019-03-27 DIAGNOSIS — I10 ESSENTIAL HYPERTENSION: Primary | ICD-10-CM

## 2019-03-27 DIAGNOSIS — Z23 NEED FOR DIPHTHERIA-TETANUS-PERTUSSIS (TDAP) VACCINE: ICD-10-CM

## 2019-03-27 DIAGNOSIS — E78.5 DYSLIPIDEMIA: ICD-10-CM

## 2019-03-27 DIAGNOSIS — N18.30 CKD (CHRONIC KIDNEY DISEASE) STAGE 3, GFR 30-59 ML/MIN: ICD-10-CM

## 2019-03-27 PROCEDURE — 99999 PR PBB SHADOW E&M-EST. PATIENT-LVL IV: CPT | Mod: PBBFAC,HCNC,, | Performed by: INTERNAL MEDICINE

## 2019-03-27 PROCEDURE — 3074F PR MOST RECENT SYSTOLIC BLOOD PRESSURE < 130 MM HG: ICD-10-PCS | Mod: HCNC,CPTII,S$GLB, | Performed by: INTERNAL MEDICINE

## 2019-03-27 PROCEDURE — 3074F SYST BP LT 130 MM HG: CPT | Mod: HCNC,CPTII,S$GLB, | Performed by: INTERNAL MEDICINE

## 2019-03-27 PROCEDURE — 1101F PR PT FALLS ASSESS DOC 0-1 FALLS W/OUT INJ PAST YR: ICD-10-PCS | Mod: HCNC,CPTII,S$GLB, | Performed by: INTERNAL MEDICINE

## 2019-03-27 PROCEDURE — 99214 OFFICE O/P EST MOD 30 MIN: CPT | Mod: HCNC,S$GLB,, | Performed by: INTERNAL MEDICINE

## 2019-03-27 PROCEDURE — 99999 PR PBB SHADOW E&M-EST. PATIENT-LVL IV: ICD-10-PCS | Mod: PBBFAC,HCNC,, | Performed by: INTERNAL MEDICINE

## 2019-03-27 PROCEDURE — 3078F DIAST BP <80 MM HG: CPT | Mod: HCNC,CPTII,S$GLB, | Performed by: INTERNAL MEDICINE

## 2019-03-27 PROCEDURE — 99214 PR OFFICE/OUTPT VISIT, EST, LEVL IV, 30-39 MIN: ICD-10-PCS | Mod: HCNC,S$GLB,, | Performed by: INTERNAL MEDICINE

## 2019-03-27 PROCEDURE — 3078F PR MOST RECENT DIASTOLIC BLOOD PRESSURE < 80 MM HG: ICD-10-PCS | Mod: HCNC,CPTII,S$GLB, | Performed by: INTERNAL MEDICINE

## 2019-03-27 PROCEDURE — 1101F PT FALLS ASSESS-DOCD LE1/YR: CPT | Mod: HCNC,CPTII,S$GLB, | Performed by: INTERNAL MEDICINE

## 2019-03-27 RX ORDER — ATORVASTATIN CALCIUM 10 MG/1
10 TABLET, FILM COATED ORAL DAILY
Qty: 90 TABLET | Refills: 1 | Status: SHIPPED | OUTPATIENT
Start: 2019-03-27 | End: 2019-10-07 | Stop reason: SDUPTHER

## 2019-03-27 RX ORDER — AMLODIPINE BESYLATE 10 MG/1
10 TABLET ORAL DAILY
Qty: 90 TABLET | Refills: 3 | Status: SHIPPED | OUTPATIENT
Start: 2019-03-27 | End: 2020-04-02

## 2019-03-27 RX ORDER — LOSARTAN POTASSIUM 100 MG/1
100 TABLET ORAL DAILY
Qty: 90 TABLET | Refills: 3 | Status: SHIPPED | OUTPATIENT
Start: 2019-03-27 | End: 2020-04-18

## 2019-03-28 ENCOUNTER — OFFICE VISIT (OUTPATIENT)
Dept: OPTOMETRY | Facility: CLINIC | Age: 79
End: 2019-03-28
Payer: MEDICARE

## 2019-03-28 ENCOUNTER — LAB VISIT (OUTPATIENT)
Dept: LAB | Facility: HOSPITAL | Age: 79
End: 2019-03-28
Attending: INTERNAL MEDICINE
Payer: MEDICARE

## 2019-03-28 DIAGNOSIS — H11.823 CONJUNCTIVAL CHALASIS, BILATERAL: ICD-10-CM

## 2019-03-28 DIAGNOSIS — R73.03 PRE-DIABETES: ICD-10-CM

## 2019-03-28 DIAGNOSIS — H35.033 HYPERTENSIVE RETINOPATHY OF BOTH EYES: Primary | ICD-10-CM

## 2019-03-28 DIAGNOSIS — E78.5 DYSLIPIDEMIA: ICD-10-CM

## 2019-03-28 DIAGNOSIS — I10 ESSENTIAL HYPERTENSION: ICD-10-CM

## 2019-03-28 DIAGNOSIS — Z96.1 PSEUDOPHAKIA OF BOTH EYES: ICD-10-CM

## 2019-03-28 LAB
ALBUMIN SERPL BCP-MCNC: 4.4 G/DL (ref 3.5–5.2)
ALP SERPL-CCNC: 65 U/L (ref 55–135)
ALT SERPL W/O P-5'-P-CCNC: 33 U/L (ref 10–44)
ANION GAP SERPL CALC-SCNC: 10 MMOL/L (ref 8–16)
AST SERPL-CCNC: 35 U/L (ref 10–40)
BASOPHILS # BLD AUTO: 0.02 K/UL (ref 0–0.2)
BASOPHILS NFR BLD: 0.3 % (ref 0–1.9)
BILIRUB SERPL-MCNC: 0.7 MG/DL (ref 0.1–1)
BUN SERPL-MCNC: 19 MG/DL (ref 8–23)
CALCIUM SERPL-MCNC: 9.5 MG/DL (ref 8.7–10.5)
CHLORIDE SERPL-SCNC: 106 MMOL/L (ref 95–110)
CHOLEST SERPL-MCNC: 114 MG/DL (ref 120–199)
CHOLEST/HDLC SERPL: 3.1 {RATIO} (ref 2–5)
CO2 SERPL-SCNC: 26 MMOL/L (ref 23–29)
CREAT SERPL-MCNC: 1.3 MG/DL (ref 0.5–1.4)
DIFFERENTIAL METHOD: NORMAL
EOSINOPHIL # BLD AUTO: 0.2 K/UL (ref 0–0.5)
EOSINOPHIL NFR BLD: 2.9 % (ref 0–8)
ERYTHROCYTE [DISTWIDTH] IN BLOOD BY AUTOMATED COUNT: 13.3 % (ref 11.5–14.5)
EST. GFR  (AFRICAN AMERICAN): 60 ML/MIN/1.73 M^2
EST. GFR  (NON AFRICAN AMERICAN): 52 ML/MIN/1.73 M^2
ESTIMATED AVG GLUCOSE: 117 MG/DL (ref 68–131)
GLUCOSE SERPL-MCNC: 97 MG/DL (ref 70–110)
HBA1C MFR BLD HPLC: 5.7 % (ref 4–5.6)
HCT VFR BLD AUTO: 44.2 % (ref 40–54)
HDLC SERPL-MCNC: 37 MG/DL (ref 40–75)
HDLC SERPL: 32.5 % (ref 20–50)
HGB BLD-MCNC: 14.3 G/DL (ref 14–18)
LDLC SERPL CALC-MCNC: 67.4 MG/DL (ref 63–159)
LYMPHOCYTES # BLD AUTO: 2.6 K/UL (ref 1–4.8)
LYMPHOCYTES NFR BLD: 40.5 % (ref 18–48)
MCH RBC QN AUTO: 28.8 PG (ref 27–31)
MCHC RBC AUTO-ENTMCNC: 32.4 G/DL (ref 32–36)
MCV RBC AUTO: 89 FL (ref 82–98)
MONOCYTES # BLD AUTO: 0.7 K/UL (ref 0.3–1)
MONOCYTES NFR BLD: 10.3 % (ref 4–15)
NEUTROPHILS # BLD AUTO: 2.9 K/UL (ref 1.8–7.7)
NEUTROPHILS NFR BLD: 45.7 % (ref 38–73)
NONHDLC SERPL-MCNC: 77 MG/DL
PLATELET # BLD AUTO: 193 K/UL (ref 150–350)
PMV BLD AUTO: 10.2 FL (ref 9.2–12.9)
POTASSIUM SERPL-SCNC: 4.2 MMOL/L (ref 3.5–5.1)
PROT SERPL-MCNC: 8.3 G/DL (ref 6–8.4)
RBC # BLD AUTO: 4.97 M/UL (ref 4.6–6.2)
SODIUM SERPL-SCNC: 142 MMOL/L (ref 136–145)
TRIGL SERPL-MCNC: 48 MG/DL (ref 30–150)
WBC # BLD AUTO: 6.29 K/UL (ref 3.9–12.7)

## 2019-03-28 PROCEDURE — 92014 COMPRE OPH EXAM EST PT 1/>: CPT | Mod: HCNC,S$GLB,, | Performed by: OPTOMETRIST

## 2019-03-28 PROCEDURE — 36415 COLL VENOUS BLD VENIPUNCTURE: CPT | Mod: HCNC

## 2019-03-28 PROCEDURE — 83036 HEMOGLOBIN GLYCOSYLATED A1C: CPT | Mod: HCNC

## 2019-03-28 PROCEDURE — 92014 PR EYE EXAM, EST PATIENT,COMPREHESV: ICD-10-PCS | Mod: HCNC,S$GLB,, | Performed by: OPTOMETRIST

## 2019-03-28 PROCEDURE — 99999 PR PBB SHADOW E&M-EST. PATIENT-LVL III: CPT | Mod: PBBFAC,HCNC,, | Performed by: OPTOMETRIST

## 2019-03-28 PROCEDURE — 85025 COMPLETE CBC W/AUTO DIFF WBC: CPT | Mod: HCNC

## 2019-03-28 PROCEDURE — 80053 COMPREHEN METABOLIC PANEL: CPT | Mod: HCNC

## 2019-03-28 PROCEDURE — 99999 PR PBB SHADOW E&M-EST. PATIENT-LVL III: ICD-10-PCS | Mod: PBBFAC,HCNC,, | Performed by: OPTOMETRIST

## 2019-03-28 PROCEDURE — 80061 LIPID PANEL: CPT | Mod: HCNC

## 2019-03-28 NOTE — LETTER
March 28, 2019      Rosita Rios MD  [address]  [facility] [address]           [facility]  [address]  [facility] [address]  Phone: [phone]          Patient: Taran Hernandes   MR Number: [id number]   YOB: 1940   Date of Visit: 3/28/2019       Dear Dr. Rosita Rios:    Thank you for referring Taran Hernandes to me for evaluation. Attached you will find relevant portions of my assessment and plan of care.    If you have questions, please do not hesitate to call me. I look forward to following Taran Hernandes along with you.    Sincerely,    Le Corey, OD    Enclosure  CC:  No Recipients    If you would like to receive this communication electronically, please contact [email or web address] or [phone] to request more information on [employer] Link access.    For providers and/or their staff who would like to refer a patient to [facility], please contact us through our one-stop-shop provider referral line, [facility], at [phone].    If you feel you have received this communication in error or would no longer like to receive these types of communications, please e-mail [email or web address]

## 2019-03-28 NOTE — PROGRESS NOTES
Subjective:       Patient ID: Taran Hernandes is a 78 y.o. male.    Chief Complaint: Annual Exam    Last seen 13 months ago. Returns for annual exam and f/u chronic medical conditions. Move to [place] did not work out, he has moved back here. Will have extended traveling throughout [place] soon. Home BP ranges about the same as it is here per history, no log for review.    PMH:  Hypertension. Stress Echo negative , EF 60%.  Pre-Diabetes. HbA1c 5.7%.  Mild Dyslipidemia. TChol 131, TG 59, HDL 36, LDL 83 .  CKD stage 3, Creat 1.4, GFR 48.   Abdominal Aorta Atherosclerosis on imaging.   Fatty Liver.  GERD.  Colon Polyp, adenomas.   Hematuria, recurrent UTI, BPH - Cystoscopy 3/10, TRUS with biopsy , Urology f/u .  Mild Lumbar DJD/Disc disease, MRI .   Diffuse Diverticulosis.  Acute Prostatitis with sepsis 2013.  Acute Pancreatitis Aug. '16.    Hearing impairment, eval by ENT in the past.     Colonoscopy 7/15 one tubular adenoma, subepithelial lipoma in rectum. PSA 4.2 , followed by Dr. King/Urology. Eye exam . Flu shot . Tetanus . Pneumovax . Prevnar 6/15.     PSH: Cholecystectomy. Left Ankle Fracture. Bilateral Cataract Extraction.    Social: Never smoked, No alcohol. , 6 children all living, 5 local.  - plans to retire soon and would like to move back to [place].     FMH: Father  age 76 with HTN, DM, Heart disease. Mother  age 82 complications of C-scope, suspected to have Colon cancer. Half brother had osteosarcoma. Half brother had liver cancer, heavy EtOH. Half brother  of a stroke noncompliant with HTN management.     NKDA. No food allergies.     MEDICATIONS: list reviewed and reconciled. Tylenol ES 500mg prn, one tab about twice a week.         Review of Systems   Constitutional: Negative for activity change, appetite change, chills, fatigue, fever and unexpected weight change.        Exercises at the gym three days a week  "or more.    HENT: Positive for hearing loss. Negative for congestion, ear pain, postnasal drip, rhinorrhea, sore throat, trouble swallowing and voice change.    Eyes: Negative for pain, discharge and visual disturbance.   Respiratory: Negative for apnea, cough, chest tightness, shortness of breath and wheezing.    Cardiovascular: Negative for chest pain, palpitations and leg swelling.   Gastrointestinal: Negative for abdominal pain, blood in stool, constipation, diarrhea, nausea and vomiting.        Mild reflux, using Omeprazole as needed.    Endocrine: Negative for polydipsia and polyuria.   Genitourinary: Negative for difficulty urinating, dysuria, frequency, hematuria, scrotal swelling and urgency.   Musculoskeletal: Negative for arthralgias, back pain, gait problem, joint swelling, myalgias, neck pain and neck stiffness.        Intermittent sciatica in right leg - pain along lateral thigh and calf, relieved with Tylenol, does not feel the need for more intensive intervention at this time.    Skin: Negative for color change and rash.        Pruritis of skin without rash often.    Neurological: Negative for dizziness, seizures, syncope, weakness, numbness and headaches.   Hematological: Negative for adenopathy. Does not bruise/bleed easily.   Psychiatric/Behavioral: Negative for agitation, confusion, dysphoric mood and sleep disturbance. The patient is not nervous/anxious.        Objective:    /76, Pulse 75, Ht 5' 4", Wt 174 lbs, BMI=29.9  Physical Exam   Constitutional: He is oriented to person, place, and time. He appears well-developed and well-nourished. No distress.   HENT:   Head: Normocephalic and atraumatic.   Right Ear: External ear normal.   Left Ear: External ear normal.   Nose: Nose normal.   Mouth/Throat: Oropharynx is clear and moist.   Eyes: Pupils are equal, round, and reactive to light. Conjunctivae and EOM are normal. No scleral icterus.   Neck: Normal range of motion. Neck supple. No JVD " present. Carotid bruit is not present. No thyromegaly present.   Cardiovascular: Normal rate, regular rhythm, normal heart sounds and intact distal pulses. Exam reveals no gallop and no friction rub.   No murmur heard.  Pulmonary/Chest: Effort normal and breath sounds normal. No respiratory distress. He has no wheezes. He has no rales.   Abdominal: Soft. Bowel sounds are normal. He exhibits no distension and no mass. There is no tenderness.   Musculoskeletal: Normal range of motion. He exhibits no edema, tenderness or deformity.   Lymphadenopathy:     He has no cervical adenopathy.   Neurological: He is alert and oriented to person, place, and time. He has normal reflexes. No cranial nerve deficit. He exhibits normal muscle tone. Coordination normal.   Skin: Skin is warm and dry. No rash noted. He is not diaphoretic.   Psychiatric: He has a normal mood and affect. His behavior is normal. Judgment and thought content normal.       Assessment:       1. Essential hypertension    2. Pre-diabetes    3. Dyslipidemia    4. Atherosclerosis of abdominal aorta    5. CKD (chronic kidney disease) stage 3, GFR 30-59 ml/min    6. Fatty liver    7. Gastroesophageal reflux disease, esophagitis presence not specified    8. Need for diphtheria-tetanus-pertussis (Tdap) vaccine        Plan:       Essential hypertension controlled, continue same.   -     CBC auto differential; Future; Expected date: 03/27/2019  -     Comprehensive metabolic panel; Future; Expected date: 03/27/2019  -     Ambulatory Referral to Optometry  -     losartan (COZAAR) 100 MG tablet; Take 1 tablet (100 mg total) by mouth once daily.  Dispense: 90 tablet; Refill: 3  -     amLODIPine (NORVASC) 10 MG tablet; Take 1 tablet (10 mg total) by mouth once daily.  Dispense: 90 tablet; Refill: 3    Pre-diabetes  -     Hemoglobin A1c; Future; Expected date: 03/27/2019    Dyslipidemia  -     Lipid panel; Future; Expected date: 03/27/2019  -     atorvastatin (LIPITOR) 10 MG  tablet; Take 1 tablet (10 mg total) by mouth once daily.  Dispense: 90 tablet; Refill: 1    Atherosclerosis of abdominal aorta  -     atorvastatin (LIPITOR) 10 MG tablet; Take 1 tablet (10 mg total) by mouth once daily.  Dispense: 90 tablet; Refill: 1    CKD (chronic kidney disease) stage 3, GFR 30-59 ml/min - labs as above, avoid NSAIDS, change Prilosec to Zantac.     Fatty liver - counseled on diet and exercise for all of above.     Gastroesophageal reflux disease, esophagitis presence not specified  -     ranitidine (ZANTAC) 150 MG tablet; Take 1 tablet (150 mg total) by mouth nightly. For Acid Reflux.  Dispense: 90 tablet; Refill: 3    Need for diphtheria-tetanus-pertussis (Tdap) vaccine - Tdap today.

## 2019-03-28 NOTE — Clinical Note
Dear Dr. Rios,Thank you for referring Mr. Hernandes for a hypertensive eye examination. He has mild hypertensive retinopathy (likely due to previously uncontrolled hypertension). I will check his eyes again in 1 year. Please let me know if you have questions.Sincerely,Le Corey OD

## 2019-03-28 NOTE — PROGRESS NOTES
HPI     Mr. Taran Hernandes was referred by Rosita Rios MD for   hypertensive eye exam.    Eyes are sometimes itchy and watery OU.  No vision complaints; he declines refraction.    (-)drops  (-)flashes  (-)floaters  (-)diplopia    Diabetic no  Hemoglobin A1C       Date                     Value               Ref Range             Status           03/28/2019               5.7 (H)             4.0 - 5.6 %         Final  04/26/2018               5.7 (H)             4.0 - 5.6 %         Final  03/09/2017               5.8                 4.5 - 6.2 %         Final    (+)HTN  BP Readings from Last 3 Encounters:  03/27/19 : 122/76  03/22/19 : 112/64  01/14/19 : 132/76      OCULAR HISTORY  Last Eye Exam 3/29/16 Dr Ruggiero  CE with IOL OS 2/22/16  3/27/16 PC toric IOL OD Dr Ruggiero   (-)diagnosed or treated for any eye conditions or diseases      FAMILY HISTORY  (-)Glaucoma          Last edited by Le Corey, OD on 3/28/2019  3:29 PM. (History)            Assessment /Plan     For exam results, see Encounter Report.    Hypertensive retinopathy of both eyes   Mild retinopathy OU, likely due to previously uncontrolled HTN, but BP has been well controlled recently. Patient educated on findings and potential risk for retinal vascular occlusions. Continue management of HTN as directed by PCP. Monitor yearly.     Conjunctival chalasis, bilateral   Recommended artificial tears BID-QID OU.    Pseudophakia of both eyes   Doing well OU. Monitor.       RTC 1 year

## 2019-03-29 ENCOUNTER — TELEPHONE (OUTPATIENT)
Dept: INTERNAL MEDICINE | Facility: CLINIC | Age: 79
End: 2019-03-29

## 2019-03-29 ENCOUNTER — PATIENT MESSAGE (OUTPATIENT)
Dept: INTERNAL MEDICINE | Facility: CLINIC | Age: 79
End: 2019-03-29

## 2019-03-29 DIAGNOSIS — I10 ESSENTIAL HYPERTENSION: Primary | ICD-10-CM

## 2019-03-29 NOTE — TELEPHONE ENCOUNTER
----- Message from Félix Khanna sent at 3/29/2019  3:43 PM CDT -----  Contact: Patient [phone]  New prescription request    He said Brittany advised him to have you send a prescription for a blood pressure monitor to the pharmacy and they will cover it.    Pharmacy: [facility] [address] [place] [address] [address] [facility] [phone] (Phone) [phone] (Fax)      Thank you

## 2019-04-01 RX ORDER — ACETAMINOPHEN 500 MG
TABLET ORAL
Qty: 1 EACH | Refills: 0 | Status: SHIPPED | OUTPATIENT
Start: 2019-04-01 | End: 2019-04-01 | Stop reason: SDUPTHER

## 2019-04-01 RX ORDER — ACETAMINOPHEN 500 MG
TABLET ORAL
Qty: 1 EACH | Refills: 0 | Status: SHIPPED | OUTPATIENT
Start: 2019-04-01 | End: 2020-09-22

## 2019-04-08 ENCOUNTER — PATIENT MESSAGE (OUTPATIENT)
Dept: INTERNAL MEDICINE | Facility: CLINIC | Age: 79
End: 2019-04-08

## 2019-04-09 ENCOUNTER — TELEPHONE (OUTPATIENT)
Dept: UROGYNECOLOGY | Facility: CLINIC | Age: 79
End: 2019-04-09

## 2019-04-09 NOTE — TELEPHONE ENCOUNTER
Spoke to pt and r/s him in Urology at 4:20 and informed him I'd send the appointment letter in the mail.

## 2019-04-16 ENCOUNTER — OFFICE VISIT (OUTPATIENT)
Dept: UROLOGY | Facility: CLINIC | Age: 79
End: 2019-04-16
Payer: MEDICARE

## 2019-04-16 VITALS
SYSTOLIC BLOOD PRESSURE: 137 MMHG | HEIGHT: 64 IN | DIASTOLIC BLOOD PRESSURE: 79 MMHG | HEART RATE: 89 BPM | WEIGHT: 174 LBS | BODY MASS INDEX: 29.71 KG/M2

## 2019-04-16 DIAGNOSIS — R35.0 URINARY FREQUENCY: ICD-10-CM

## 2019-04-16 DIAGNOSIS — N50.812 PAIN IN LEFT TESTICLE: ICD-10-CM

## 2019-04-16 DIAGNOSIS — R35.1 NOCTURIA: Primary | ICD-10-CM

## 2019-04-16 DIAGNOSIS — N40.1 BPH WITH URINARY OBSTRUCTION: ICD-10-CM

## 2019-04-16 DIAGNOSIS — N13.8 BPH WITH URINARY OBSTRUCTION: ICD-10-CM

## 2019-04-16 LAB
BILIRUB SERPL-MCNC: NORMAL MG/DL
BLOOD URINE, POC: NORMAL
COLOR, POC UA: YELLOW
GLUCOSE UR QL STRIP: NORMAL
KETONES UR QL STRIP: NORMAL
LEUKOCYTE ESTERASE URINE, POC: NORMAL
NITRITE, POC UA: NORMAL
PH, POC UA: 5
POC RESIDUAL URINE VOLUME: 10 ML (ref 0–100)
PROTEIN, POC: NORMAL
SPECIFIC GRAVITY, POC UA: 1.02
UROBILINOGEN, POC UA: NORMAL

## 2019-04-16 PROCEDURE — 99999 PR PBB SHADOW E&M-EST. PATIENT-LVL III: CPT | Mod: PBBFAC,HCNC,, | Performed by: NURSE PRACTITIONER

## 2019-04-16 PROCEDURE — 3075F PR MOST RECENT SYSTOLIC BLOOD PRESS GE 130-139MM HG: ICD-10-PCS | Mod: HCNC,CPTII,S$GLB, | Performed by: NURSE PRACTITIONER

## 2019-04-16 PROCEDURE — 1101F PT FALLS ASSESS-DOCD LE1/YR: CPT | Mod: HCNC,CPTII,S$GLB, | Performed by: NURSE PRACTITIONER

## 2019-04-16 PROCEDURE — 3075F SYST BP GE 130 - 139MM HG: CPT | Mod: HCNC,CPTII,S$GLB, | Performed by: NURSE PRACTITIONER

## 2019-04-16 PROCEDURE — 51798 US URINE CAPACITY MEASURE: CPT | Mod: HCNC,S$GLB,, | Performed by: NURSE PRACTITIONER

## 2019-04-16 PROCEDURE — 3078F PR MOST RECENT DIASTOLIC BLOOD PRESSURE < 80 MM HG: ICD-10-PCS | Mod: HCNC,CPTII,S$GLB, | Performed by: NURSE PRACTITIONER

## 2019-04-16 PROCEDURE — 81001 POCT URINALYSIS, DIPSTICK OR TABLET REAGENT, AUTOMATED, WITH MICROSCOP: ICD-10-PCS | Mod: HCNC,S$GLB,, | Performed by: NURSE PRACTITIONER

## 2019-04-16 PROCEDURE — 99999 PR PBB SHADOW E&M-EST. PATIENT-LVL III: ICD-10-PCS | Mod: PBBFAC,HCNC,, | Performed by: NURSE PRACTITIONER

## 2019-04-16 PROCEDURE — 51798 PR MEAS,POST-VOID RES,US,NON-IMAGING: ICD-10-PCS | Mod: HCNC,S$GLB,, | Performed by: NURSE PRACTITIONER

## 2019-04-16 PROCEDURE — 1101F PR PT FALLS ASSESS DOC 0-1 FALLS W/OUT INJ PAST YR: ICD-10-PCS | Mod: HCNC,CPTII,S$GLB, | Performed by: NURSE PRACTITIONER

## 2019-04-16 PROCEDURE — 99214 PR OFFICE/OUTPT VISIT, EST, LEVL IV, 30-39 MIN: ICD-10-PCS | Mod: 25,HCNC,S$GLB, | Performed by: NURSE PRACTITIONER

## 2019-04-16 PROCEDURE — 99214 OFFICE O/P EST MOD 30 MIN: CPT | Mod: 25,HCNC,S$GLB, | Performed by: NURSE PRACTITIONER

## 2019-04-16 PROCEDURE — 3078F DIAST BP <80 MM HG: CPT | Mod: HCNC,CPTII,S$GLB, | Performed by: NURSE PRACTITIONER

## 2019-04-16 PROCEDURE — 81001 URINALYSIS AUTO W/SCOPE: CPT | Mod: HCNC,S$GLB,, | Performed by: NURSE PRACTITIONER

## 2019-04-16 RX ORDER — OXYBUTYNIN CHLORIDE 5 MG/1
5 TABLET, EXTENDED RELEASE ORAL DAILY
Qty: 90 TABLET | Refills: 0 | Status: SHIPPED | OUTPATIENT
Start: 2019-04-16 | End: 2019-10-21

## 2019-04-16 RX ORDER — TAMSULOSIN HYDROCHLORIDE 0.4 MG/1
0.4 CAPSULE ORAL DAILY
Qty: 90 CAPSULE | Refills: 3 | Status: SHIPPED | OUTPATIENT
Start: 2019-04-16 | End: 2020-05-07

## 2019-04-16 NOTE — LETTER
April 16, 2019      Rosita Rios MD  [address]  [facility] [address]           [facility] 4th [facility]  [address]  [facility] [address]  Phone: [phone]          Patient: Taran Hernandes   MR Number: [id number]   YOB: 1940   Date of Visit: 4/16/2019       Dear Dr. Rosita Rios:    Thank you for referring Taran Hernandes to me for evaluation. Attached you will find relevant portions of my assessment and plan of care.    If you have questions, please do not hesitate to call me. I look forward to following Taran Hernandes along with you.    Sincerely,    Christa Moreno, WEST    Enclosure  CC:  No Recipients    If you would like to receive this communication electronically, please contact [email or web address] or [phone] to request more information on [employer] Link access.    For providers and/or their staff who would like to refer a patient to [facility], please contact us through our one-stop-shop provider referral line, [facility], at [phone].    If you feel you have received this communication in error or would no longer like to receive these types of communications, please e-mail [email or web address]

## 2019-04-16 NOTE — PATIENT INSTRUCTIONS
Testicle pain - good scrotal support, tylenol, scrotal elevation, ice packs    For lower urinary tract symptoms:  Reduce PM fluids, no caffeine after 3 pm, void before bed, elevated legs 2 hours before bed      Testicular Pain, Unclear Cause  You have had pain in one or both testicles. Based on your exam today, the exact cause of your pain is not certain. But your condition does not appear to be dangerous. Testicles are very sensitive. Even a small injury can cause quite a bit of pain. Other possible causes of testicular pain include kidney stones, cysts, mumps, inflammatory conditions, chronic conditions, hernia, infection, and a twisted testicle.  Certain tests may be done to rule out an underlying problem causing the pain. Nothing conclusive was found today. Most likely, the pain will go away on its own. If it doesnt, you may need more tests.    Home care  Medicine may be prescribed to help relieve pain and swelling. This may be an over-the-counter pain reliever or prescription pain medication. Take all medicine as directed.  The following are general care guidelines:  · To relieve pain and swelling, apply an ice pack wrapped in a thin towel for 10 minutes at a time. Continue this on and off for 1 to 2 days.  · When lying down, place a small rolled towel under your scrotum. When moving around, wear a jockstrap (athletic supporter) or supportive underwear. These will help support and protect your testicles.  · If it hurts to walk, walk as little as possible until you feel better.  · Avoid strenuous activity until you feel better.  · Do not have sex until you feel better.  · If you have severe pain in the testicle, seek care right away. Delay may lead to permanent loss of the testicles function.  Follow-up care  Follow up with your healthcare provider, or as advised.  When to seek medical advice  Call your healthcare provider right away if any of these occur:  · Fever of 100.4°F (38°C) or higher  · Worsening of  the pain or severe pain  · Swelling of the testicle or scrotum  · A lump in the scrotum  · Warm and red scrotum (signs of infection)  · Nausea and vomiting  · Pain or swelling in abdomen  · Trouble urinating  · Numbness or weakness in the leg  · Shrinking of the testicle  · Blood in your urine  Date Last Reviewed: 10/1/2016  © 8448-9217 [employer]. [address] [place] [address], [place] [address]. All rights reserved. This information is not intended as a substitute for professional medical care. Always follow your healthcare professional's instructions.

## 2019-04-16 NOTE — PROGRESS NOTES
CHIEF COMPLAINT:    Mr. Hernandes is a 78 y.o. male presenting for left testicle pain and nocturia.  PRESENTING ILLNESS:    Taran Hernandes is a 78 y.o. male with a PMH of BPH and elevated PSA who presents for left testicle pain and nocturia. His last clinic visit was 1/14/19 with TRISTON Aguilar NP.    history of an elevated PSA s/p a negative TRUS bx in 2013 when his PSA was 4.6.    Pt has BPH and takes flomax.    He c/o LUTS. He has been taking flomax daily for symptoms. TRISTON Aguilar NP ordered trospium for patient at last clinic visit but insurance would not cover medication. He was switched to oxybutynin 5 mg but patient was not aware and did not start it. Flomax has improved his stream but he continues to c/o nocturia x 5. Mild daytime frequency. Denies urgency or incontinence. He continues to have mild hesitancy and intermittency while on flomax but not bothersome to patient. Denies dysuria, hematuria or flank pain. Denies fever or chills.    Pt reports left testicle pain that is constant and started a few weeks ago. Pain is dull, 2/10. Worsens with walking or sitting. Denies scrotal swelling or penile pain or discharge.     REVIEW OF SYSTEMS:    Review of Systems    Constitutional: Negative for fever and chills.   HENT: Negative for hearing loss.   Eyes: Negative for visual disturbance.   Respiratory: Negative for shortness of breath.   Cardiovascular: Negative for chest pain.   Gastrointestinal: Negative for nausea, vomiting, and constipation.   Genitourinary:  See above.   Neurological: Negative for dizziness.   Hematological: Does not bruise/bleed easily.   Psychiatric/Behavioral: Negative for confusion.       PATIENT HISTORY:    Past Medical History:   Diagnosis Date    BPH (benign prostatic hyperplasia)     BPH with urinary obstruction 8/22/2016    DDD (degenerative disc disease), lumbar     Dyslipidemia     Elevated prostate specific antigen (PSA) 8/22/2016    Fatty liver     GERD (gastroesophageal  reflux disease)     Hematuria     History of adenomatous polyp of colon 7/17/2015    History of colonic polyps     Hypertension     Lumbar disc disease     Mild vitamin D deficiency     Renal disorder        Past Surgical History:   Procedure Laterality Date    CATARACT EXTRACTION W/  INTRAOCULAR LENS IMPLANT Left     Dr Ruggiero     CATARACT EXTRACTION W/  INTRAOCULAR LENS IMPLANT Right 03/27/16    Dr Ruggiero    CHOLECYSTECTOMY      COLONOSCOPY N/A 7/17/2015    Performed by Moody Morton MD at [facility] (4TH FLR)    ERCP N/A 11/2/2016    Performed by James Ashford MD at [facility] (2ND FLR)    ERCP N/A 10/10/2016    [facility] by [facility] at [facility] (2[facility] FLR)    ESOPHAGOGASTRODUODENOSCOPY (EGD) N/A 11/14/2016    Per[facility] by [facility] at [facility] (2ND FLR)    FRACTURE SURGERY      left ankle    INSERTION-INTRAOCULAR LENS (IOL) Right 3/28/2016    Performed by Ch[facility]iero MD at [facility] OR    INSERTION-INTRAOCULAR LENS (IOL) Left 2/22/2016    [facility]formed by [facility]ico [facility] at [facility]    LASIK Bilateral 2016    NEUROMA SURGERY Right 2006    PHACOEMULSIFICATION-ASPIRATION-CATARACT Right 3/28/2016    Performed by Chico [facility]iero MD at [facility] OR    PHACOEMULSIFICATION-ASPIRATION-CATARACT Left 2/22/2016    Performed by Chico [facility]iero MD at [facility] OR    ULTRASOUND-ENDOSCOPIC-LOWER N/A 3/18/2016    Performed by Alexi Ruggiero MD at [facility] (2ND FLR)    ULTRASOUND-ENDOSCOPIC-LOWER N/A 10/22/2015    Performed by Simon Finn MD at [facility] (2ND FLR)    ULTRASOUND-ENDOSCOPIC-UPPER N/A 9/21/2016    Performed by Alexi Ruggiero MD at [facility] (2ND FLR)       Family History   Problem Relation Age of Onset    Cancer Mother         complications of c-scope/ suspect colon cancer    Hypertension Father     Diabetes Father     Heart disease Father     Cancer Brother         liver/ etoh    Hypertension Brother     No Known Problems Daughter     Heart attack Son     No  Known Problems Son     No Known Problems Son     No Known Problems Son     Melanoma Neg Hx     Blindness Neg Hx     Glaucoma Neg Hx     Macular degeneration Neg Hx     Retinal detachment Neg Hx        Social History     Socioeconomic History    Marital status:      Spouse name: Not on file    Number of children: Not on file    Years of education: Not on file    Highest education level: Not on file   Occupational History    Occupation:     Social Needs    Financial resource strain: Not on file    Food insecurity:     Worry: Not on file     Inability: Not on file    Transportation needs:     Medical: Not on file     Non-medical: Not on file   Tobacco Use    Smoking status: Never Smoker    Smokeless tobacco: Never Used   Substance and Sexual Activity    Alcohol use: Yes     Comment: A couple of beers a week    Drug use: No    Sexual activity: Yes     Partners: Female   Lifestyle    Physical activity:     Days per week: Not on file     Minutes per session: Not on file    Stress: Not on file   Relationships    Social connections:     Talks on phone: Not on file     Gets together: Not on file     Attends [employer] service: Not on file     Active member of club or organization: Not on file     Attends meetings of clubs or organizations: Not on file     Relationship status: Not on file   Other Topics Concern    Not on file   Social History Narrative    Not on file       Allergies:  No known allergies    Medications:    Current Outpatient Medications:     acetaminophen (TYLENOL EXTRA STRENGTH) 500 MG tablet, Take 500 mg by mouth every 6 (six) hours as needed for Pain., Disp: , Rfl:     amLODIPine (NORVASC) 10 MG tablet, Take 1 tablet (10 mg total) by mouth once daily., Disp: 90 tablet, Rfl: 3    aspirin (ECOTRIN) 81 MG EC tablet, Take 81 mg by mouth once daily., Disp: , Rfl:     atorvastatin (LIPITOR) 10 MG tablet, Take 1 tablet (10 mg total) by mouth once daily., Disp: 90  tablet, Rfl: 1    blood pressure monitor Kit, Use as directed., Disp: 1 each, Rfl: 0    losartan (COZAAR) 100 MG tablet, Take 1 tablet (100 mg total) by mouth once daily., Disp: 90 tablet, Rfl: 3    ranitidine (ZANTAC) 150 MG tablet, Take 1 tablet (150 mg total) by mouth nightly. For Acid Reflux., Disp: 90 tablet, Rfl: 3    tamsulosin (FLOMAX) 0.4 mg Cap, Take 1 capsule (0.4 mg total) by mouth once daily., Disp: 90 capsule, Rfl: 3    oxybutynin (DITROPAN-XL) 5 MG TR24, Take 1 tablet (5 mg total) by mouth once daily., Disp: 90 tablet, Rfl: 0    PHYSICAL EXAMINATION:    Constitutional: He is oriented to person, place, and time. He appears well-developed and well-nourished.  He is in no apparent distress.    Neck: Normal ROM.     Cardiovascular: Normal rate.      Pulmonary/Chest: Effort normal. No respiratory distress.     Abdominal:  He exhibits no distension.  There is no CVA tenderness.     Lymphadenopathy:        Right: No supraclavicular adenopathy present.        Left: No supraclavicular adenopathy present.     Neurological: He is alert and oriented to person, place, and time.     Skin: Skin is warm and dry.     Extremities: Normal ROM    Psych: Cooperative with normal affect.    Genitourinary: The penis is uncircumcised with no evidence of phimosis or paraphimosis. No penile erythema or tenderness.  Left testicle tender to palpation. No swelling or mass.  Right testicle- no mass, swelling or tenderness.    Prostate exam done 1/14/19.    Physical Exam      LABS:    U/a: sp grav 1.020, pH 5, negative results    PVR: done in clinic with bladder scanner by Nurse Yong was 2 ml.    Lab Results   Component Value Date    PSA 4.2 (H) 07/20/2016    PSA 4.9 (H) 06/03/2015    PSA 5.4 (H) 05/27/2014       IMPRESSION:    Encounter Diagnoses   Name Primary?    Nocturia Yes    BPH with urinary obstruction     Urinary frequency     Pain in left testicle          PLAN:  -Refilled flomax.   -Discussed adding oxybutynin  with flomax.  Discussed side effects, indications, and MOA for oxybutynin. Prescription sent to the pharmacy. Pt verbalized understanding.  -Discussed surgical intervention for BPH/LUTS. Pt not interested in surgery at this time.  -BPH/LUTS: Reduce PM fluids, no caffeine after 3 pm, void before bed, elevated legs 2 hours before bed  -Testicle pain - good scrotal support, tylenol, scrotal elevation, ice packs  Scrotal US ordered and scheduled  -RTC 4-6 weeks to reassess symptoms      I spent 25 minutes with the patient of which more than half was spent in coordinating the patient's care as well as in direct consultation with the patient in regards to our treatment and plan.

## 2019-04-17 ENCOUNTER — PATIENT MESSAGE (OUTPATIENT)
Dept: INTERNAL MEDICINE | Facility: CLINIC | Age: 79
End: 2019-04-17

## 2019-04-17 ENCOUNTER — HOSPITAL ENCOUNTER (OUTPATIENT)
Dept: RADIOLOGY | Facility: HOSPITAL | Age: 79
Discharge: HOME | End: 2019-04-17
Attending: NURSE PRACTITIONER
Payer: MEDICARE

## 2019-04-17 DIAGNOSIS — N50.812 PAIN IN LEFT TESTICLE: ICD-10-CM

## 2019-04-17 PROCEDURE — 76870 US EXAM SCROTUM: CPT | Mod: TC,HCNC

## 2019-04-17 PROCEDURE — 76870 US EXAM SCROTUM: CPT | Mod: 26,HCNC,, | Performed by: RADIOLOGY

## 2019-04-17 PROCEDURE — 76870 US SCROTUM AND TESTICLES: ICD-10-PCS | Mod: 26,HCNC,, | Performed by: RADIOLOGY

## 2019-04-22 ENCOUNTER — OFFICE VISIT (OUTPATIENT)
Dept: URGENT CARE | Facility: CLINIC | Age: 79
End: 2019-04-22
Payer: MEDICARE

## 2019-04-22 VITALS
BODY MASS INDEX: 29.71 KG/M2 | HEIGHT: 64 IN | SYSTOLIC BLOOD PRESSURE: 119 MMHG | WEIGHT: 174 LBS | RESPIRATION RATE: 18 BRPM | DIASTOLIC BLOOD PRESSURE: 74 MMHG | TEMPERATURE: 98 F | HEART RATE: 76 BPM | OXYGEN SATURATION: 99 %

## 2019-04-22 DIAGNOSIS — L50.9 URTICARIA: Primary | ICD-10-CM

## 2019-04-22 PROCEDURE — 96372 PR INJECTION,THERAP/PROPH/DIAG2ST, IM OR SUBCUT: ICD-10-PCS | Mod: S$GLB,,, | Performed by: FAMILY MEDICINE

## 2019-04-22 PROCEDURE — 3078F PR MOST RECENT DIASTOLIC BLOOD PRESSURE < 80 MM HG: ICD-10-PCS | Mod: CPTII,S$GLB,, | Performed by: FAMILY MEDICINE

## 2019-04-22 PROCEDURE — 3074F PR MOST RECENT SYSTOLIC BLOOD PRESSURE < 130 MM HG: ICD-10-PCS | Mod: CPTII,S$GLB,, | Performed by: FAMILY MEDICINE

## 2019-04-22 PROCEDURE — 96372 THER/PROPH/DIAG INJ SC/IM: CPT | Mod: S$GLB,,, | Performed by: FAMILY MEDICINE

## 2019-04-22 PROCEDURE — 99214 OFFICE O/P EST MOD 30 MIN: CPT | Mod: 25,S$GLB,, | Performed by: FAMILY MEDICINE

## 2019-04-22 PROCEDURE — 99214 PR OFFICE/OUTPT VISIT, EST, LEVL IV, 30-39 MIN: ICD-10-PCS | Mod: 25,S$GLB,, | Performed by: FAMILY MEDICINE

## 2019-04-22 PROCEDURE — 1101F PT FALLS ASSESS-DOCD LE1/YR: CPT | Mod: CPTII,S$GLB,, | Performed by: FAMILY MEDICINE

## 2019-04-22 PROCEDURE — 1101F PR PT FALLS ASSESS DOC 0-1 FALLS W/OUT INJ PAST YR: ICD-10-PCS | Mod: CPTII,S$GLB,, | Performed by: FAMILY MEDICINE

## 2019-04-22 PROCEDURE — 3078F DIAST BP <80 MM HG: CPT | Mod: CPTII,S$GLB,, | Performed by: FAMILY MEDICINE

## 2019-04-22 PROCEDURE — 3074F SYST BP LT 130 MM HG: CPT | Mod: CPTII,S$GLB,, | Performed by: FAMILY MEDICINE

## 2019-04-22 RX ORDER — BETAMETHASONE SODIUM PHOSPHATE AND BETAMETHASONE ACETATE 3; 3 MG/ML; MG/ML
9 INJECTION, SUSPENSION INTRA-ARTICULAR; INTRALESIONAL; INTRAMUSCULAR; SOFT TISSUE
Status: COMPLETED | OUTPATIENT
Start: 2019-04-22 | End: 2019-04-22

## 2019-04-22 RX ORDER — METHYLPREDNISOLONE 4 MG/1
TABLET ORAL
Qty: 1 PACKAGE | Refills: 0 | Status: SHIPPED | OUTPATIENT
Start: 2019-04-24 | End: 2019-05-13

## 2019-04-22 RX ADMIN — BETAMETHASONE SODIUM PHOSPHATE AND BETAMETHASONE ACETATE 9 MG: 3; 3 INJECTION, SUSPENSION INTRA-ARTICULAR; INTRALESIONAL; INTRAMUSCULAR; SOFT TISSUE at 12:04

## 2019-04-22 NOTE — PATIENT INSTRUCTIONS
Understanding Hives (Urticaria)  Urticaria (hives) are red, itchy, and swollen areas on the skin. They are most often an allergic reaction from eating a food or taking a medicine. Sometimes the cause may be unknown. A single hive can vary in size from a half inch to several inches. Hives can appear all over the body. Or they may appear on only one part of the body.  What causes hives?  Hives can be caused by food and beverages such as:  · Tree nuts (almonds, walnuts, hazelnuts)  · Peanuts  · Eggs  · Shellfish  · Milk  Hives can also be caused by medicines such as:  · Antibiotics, especially penicillin and sulfa-based medicines   · Anticonvulsant or antiseizure medicines   · Chemotherapy medicines   Other causes of hives include:  · Infection or virus  · Heat  · Cold air or cold water  · Exercise  · Scratching or rubbing your skin, or wearing tight-fitting clothes that rub your skin  · Being exposed to sunlight or light from a light bulb, in rare cases  · Inhaled-chemicals in the environment from foods and drugs, insects, plants, or other sources  In some cases, hives may occur again and again with no specific cause.  If you have hives  · Avoid the food, drink, medicine, or other factor that may be causing the hives.  · Make a thick paste of baking soda and water. Apply the paste directly to your skin. This can help lessen itching.  · Talk with your healthcare provider right away if you think a medicine gave you hives.  Watch for anaphylaxis  If you have hives, watch for symptoms of a severe reaction that can affect your entire body. This is called anaphylaxis. Symptoms can include swollen areas of the body, wheezing, trouble breathing or swallowing, and a hoarse voice. This reaction may happen right away. Or it may happen in an hour or more. In extreme cases, the airways from mouth to lungs may swell and make breathing difficult. This is a medical emergency. Use epinephrine medicine if you have it, and call 911 or  go to the emergency room.     When to call your healthcare provider  Call your healthcare provider if:  · Your hives feel uncomfortable  · You have never had hives before  · Your symptoms don't go away or come back  · Your symptoms get worse or new symptoms develop such as:   ¨ Sneezing, coughing, runny or stuffy nose  ¨ Itching of the eyes, nose, or roof of the mouth  ¨ Itching, burning, stinging, or pain  ¨ Dry, flaky, cracking, or scaly skin  ¨ Red or purple spots  When to call 911  Call 911 right away if you have:  · Swelling in your lips, tongue, or throat, called angioedema  · Drooling  · Trouble breathing, talking, or swallowing  · Cool, moist or pale (blue in color) skin  · Fast and weak heartbeat  · Wheezing or short of breath  · Feeling lightheaded or confused  · Diarrhea  · Severe nausea or vomiting  · Seizure  · Feeling dizzy or weak, or a sudden drop in blood pressure   Date Last Reviewed: 4/1/2017 © 2000-2017 [employer]. [address] [place] [address], [place] [address]. All rights reserved. This information is not intended as a substitute for professional medical care. Always follow your healthcare professional's instructions.

## 2019-04-22 NOTE — PROGRESS NOTES
"Subjective:       Patient ID: Taran Hernandes is a 78 y.o. male.    Vitals:  height is 5' 4" (1.626 m) and weight is 78.9 kg (174 lb). His oral temperature is 98.4 °F (36.9 °C). His blood pressure is 119/74 and his pulse is 76. His respiration is 18 and oxygen saturation is 99%.     Chief Complaint: Rash    This is a 78 y.o. male who presents today with a chief complaint of rash for 1 month. He has tried vinegar, neosporin and Vaseline without relief. No antihistamines.    Rash   This is a new problem. The current episode started 1 to 4 weeks ago. The problem has been gradually worsening since onset. The affected locations include the face, right hand, left hand, right upper leg, left upper leg, scalp, left ear and right ear. The rash is characterized by dryness, itchiness and redness. He was exposed to nothing. Pertinent negatives include no cough, facial edema, fever, rhinorrhea, shortness of breath, sore throat or vomiting. Past treatments include nothing. The treatment provided no relief. There is no history of eczema.       Constitution: Negative for chills and fever.   HENT: Negative for facial swelling and sore throat.    Neck: Negative for painful lymph nodes.   Eyes: Negative for eye itching and eyelid swelling.   Respiratory: Negative for cough and shortness of breath.    Gastrointestinal: Negative for vomiting.   Musculoskeletal: Negative for joint pain and joint swelling.   Skin: Positive for pale, rash and erythema. Negative for color change, wound, abrasion, laceration, lesion, skin thickening/induration, puncture wound, abscess, avulsion and hives.   Allergic/Immunologic: Negative for environmental allergies, immunocompromised state and hives.   Hematologic/Lymphatic: Negative for swollen lymph nodes.       Objective:      Physical Exam   Constitutional: He appears well-developed and well-nourished.   HENT:   Head: Normocephalic and atraumatic.   Cardiovascular: Normal rate and regular rhythm. "   Pulmonary/Chest: Effort normal and breath sounds normal.   Skin: Rash (pinpoint red papular lesion noted on extremities. ) noted. There is erythema.   Nursing note and vitals reviewed.      Assessment:       1. Urticaria        Plan:         Urticaria  -     betamethasone acetate-betamethasone sodium phosphate injection 9 mg  -     methylPREDNISolone (MEDROL DOSEPACK) 4 mg tablet; use as directed  Dispense: 1 Package; Refill: 0    discussed possible allergen avoidance

## 2019-04-23 ENCOUNTER — TELEPHONE (OUTPATIENT)
Dept: UROLOGY | Facility: CLINIC | Age: 79
End: 2019-04-23

## 2019-04-23 NOTE — TELEPHONE ENCOUNTER
Reviewed scrotal US results with Dr. Christy. Benign findings. Dr. Christy recommends Augmentin or surgical intervention if patient continuing to have pain.     Spoke with patient regarding scrotal US results.  Pain has resolved. He will hold off on any treatment and if problems continue to arise, he will f/u.

## 2019-04-25 ENCOUNTER — HOSPITAL ENCOUNTER (EMERGENCY)
Facility: HOSPITAL | Age: 79
Discharge: HOME | End: 2019-04-25
Attending: EMERGENCY MEDICINE
Payer: MEDICARE

## 2019-04-25 VITALS
TEMPERATURE: 99 F | OXYGEN SATURATION: 97 % | HEART RATE: 101 BPM | HEIGHT: 64 IN | BODY MASS INDEX: 29.71 KG/M2 | RESPIRATION RATE: 18 BRPM | WEIGHT: 174 LBS | DIASTOLIC BLOOD PRESSURE: 77 MMHG | SYSTOLIC BLOOD PRESSURE: 152 MMHG

## 2019-04-25 DIAGNOSIS — L25.9 CONTACT DERMATITIS, UNSPECIFIED CONTACT DERMATITIS TYPE, UNSPECIFIED TRIGGER: Primary | ICD-10-CM

## 2019-04-25 PROCEDURE — 99284 PR EMERGENCY DEPT VISIT,LEVEL IV: ICD-10-PCS | Mod: ,,, | Performed by: EMERGENCY MEDICINE

## 2019-04-25 PROCEDURE — 99284 EMERGENCY DEPT VISIT MOD MDM: CPT | Mod: ,,, | Performed by: EMERGENCY MEDICINE

## 2019-04-25 PROCEDURE — 25000003 PHARM REV CODE 250: Mod: HCNC | Performed by: EMERGENCY MEDICINE

## 2019-04-25 PROCEDURE — 99284 EMERGENCY DEPT VISIT MOD MDM: CPT | Mod: HCNC

## 2019-04-25 RX ORDER — DIPHENHYDRAMINE HCL 25 MG
25 CAPSULE ORAL EVERY 6 HOURS PRN
Refills: 0 | COMMUNITY
Start: 2019-04-25 | End: 2019-10-21

## 2019-04-25 RX ORDER — DIPHENHYDRAMINE HCL 25 MG
25 CAPSULE ORAL
Status: COMPLETED | OUTPATIENT
Start: 2019-04-25 | End: 2019-04-25

## 2019-04-25 RX ORDER — CETIRIZINE HYDROCHLORIDE 10 MG/1
10 TABLET ORAL DAILY
Qty: 30 TABLET | Refills: 0 | COMMUNITY
Start: 2019-04-25 | End: 2019-10-21

## 2019-04-25 RX ORDER — FAMOTIDINE 20 MG/1
40 TABLET, FILM COATED ORAL
Status: COMPLETED | OUTPATIENT
Start: 2019-04-25 | End: 2019-04-25

## 2019-04-25 RX ADMIN — FAMOTIDINE 40 MG: 20 TABLET ORAL at 09:04

## 2019-04-25 RX ADMIN — DIPHENHYDRAMINE HYDROCHLORIDE 25 MG: 25 CAPSULE ORAL at 09:04

## 2019-04-26 NOTE — ED PROVIDER NOTES
Encounter Date: 4/25/2019       History     Chief Complaint   Patient presents with    Urticaria     to bilatearl hands x 2 weeks. Seen at  4 days ago, given steroid but made worse per patient. Denies any trouble breathing or swallowing.      HPI   79 Y/O M complains of swelling and itching to bilateral hands with no associated pain. Went to urgent care center, who provided him with steroid, but no antihistamine.  Denies any other complaints. He denies any dyspnea, dysphagia or tongue swelling and assures no other migratory rashes or arthralgias.  He denies history of STDs in the past and has only been sexually active with wife.  He denies any fevers or chills and assures no recent travel.  Otherwise, no recent illness, cough, sore throat, nasal congestion or any other complaints.  Review of patient's allergies indicates:   Allergen Reactions    No known allergies      Past Medical History:   Diagnosis Date    BPH (benign prostatic hyperplasia)     BPH with urinary obstruction 8/22/2016    DDD (degenerative disc disease), lumbar     Dyslipidemia     Elevated prostate specific antigen (PSA) 8/22/2016    Fatty liver     GERD (gastroesophageal reflux disease)     Hematuria     History of adenomatous polyp of colon 7/17/2015    History of colonic polyps     Hypertension     Lumbar disc disease     Mild vitamin D deficiency     Renal disorder      Past Surgical History:   Procedure Laterality Date    CATARACT EXTRACTION W/  INTRAOCULAR LENS IMPLANT Left     Dr Ruggiero     CATARACT EXTRACTION W/  INTRAOCULAR LENS IMPLANT Right 03/27/16    Dr Ruggiero    CHOLECYSTECTOMY      COLONOSCOPY N/A 7/17/2015    Performed by Moody Morton [facility] at [facility] (4TH FLR)    ERCP N/A 11/2/2016    Performed by James [facility]ford MD at [facility] (2ND FLR)    ERCP N/A 10/10/2016    Performed by Simon Finn MD at [facility] (2ND FLR)    ESOPHAGOGASTRODUODENOSCOPY (EGD) N/A 11/14/2016    Performed by James Barton  MD [place] at [facility] (2ND FLR)    FRACTURE SURGERY      left ankle    INSERTION-INTRAOCULAR LENS (IOL) Right 3/28/2016    [facility]    INSERTION-INTRAOCULAR LENS (IOL) Left 2/22/2016    [facility] Ch[facility] at [facility]    LASIK Bilateral 2016    NEUROMA SURGERY Right 2006    PHACOEMULSIFICATION-ASPIRATION-CATARACT Right 3/28/2016    [facility] Chico [facility] at [facility]    PHACOEMULSIFICATION-ASPIRATION-CATARACT Left 2/22/2016    Per[facility] Chico [facility] at [facility]    ULTRASOUND-ENDOSCOPIC-LOWER N/A 3/18/2016    Performed by Alexi R[facility]iero MD at [facility] (2ND FLR)    ULTRASOUND-ENDOSCOPIC-LOWER N/A 10/22/2015    Performed by Simon Finn MD at [facility] (2ND FLR)    ULTRASOUND-ENDOSCOPIC-UPPER N/A 9/21/2016    Performed by Alexi Ruggiero MD at [facility] (2ND FLR)     Family History   Problem Relation Age of Onset    Cancer Mother         complications of c-scope/ suspect colon cancer    Hypertension Father     Diabetes Father     Heart disease Father     Cancer Brother         liver/ etoh    Hypertension Brother     No Known Problems Daughter     Heart attack Son     No Known Problems Son     No Known Problems Son     No Known Problems Son     Melanoma Neg Hx     Blindness Neg Hx     Glaucoma Neg Hx     Macular degeneration Neg Hx     Retinal detachment Neg Hx      Social History     Tobacco Use    Smoking status: Never Smoker    Smokeless tobacco: Never Used   Substance Use Topics    Alcohol use: Yes     Comment: A couple of beers a week    Drug use: No     Review of Systems  CONST: No fever, chills, or fatigue  HEENT: No headache, sore throat, or voice changes  HEART: No pain  LUNG: No SOB, cough, or other complaints  ABDOMEN: No pain, nausea, vomiting, diarrhea, constipation, or flank pain  : No discharge, dysuria, lesions, rashes, masses, sores  EXTREMITIES: FROM with No swelling, or injuries/trauma  SKIN: + itchy rash  to hand; No lesions, trauma or other complaints    Physical Exam     Initial Vitals [04/25/19 1818]   BP Pulse Resp Temp SpO2   (!) 152/77 101 18 98.8 °F (37.1 °C) 97 %      MAP       --         Physical Exam  PHYSICAL EXAM:  GENERAL: Calm; Cooperative; Well-appearing and Non-Toxic; Well-Nourished; NAD.  HEENT: AT/NC; no conjunctiva hyperemia; speaking full sentences with no slurring of speech or drooling/inability to tolerate oral secretions.  No tongue edema.  NECK: Supple, FROM with no meningismus, no accessory muscle use.  HEART: Regular rate and rhythm, no M/G/T.  LUNGS: No Tachypnea, No Retractions, and CTA B/L with no W/R/R.  ABDOMEN: +BS, Soft, ND, NTTP.  Rovsing's, or McBurney's point tenderness.  BACK: Atraumatic, No midline TTP to C/T/LS spine; No CVA tenderness B/L. SLRT NEG.  EXTREMITIES: FROM.   SKIN:  None petechial, blanching erythema to bilateral hands.  Warm, Dry, No Skin Tears or Rashes.  VASCULAR: 2+ pulses Prox/Dist & Symmetrical with No delay.  NEUROLOGIC: AAOx3;    ED Course   Procedures  Labs Reviewed - No data to display       Imaging Results    None          Medical Decision Making:   Initial Assessment:   Afebrile, atraumatic and hemodynamically stable male presents with signs and symptoms bilateral palm contact dermatitis.  No evidence of systemic involvement or respiratory compromise will provide p.o. Antihistamine.      F/U:  Patient reports complete resolution of itching and improved symptoms. Ready to go home.  ____________________  Kwasi Crawley MD, [facility]  Emergency Medicine Staff  10:39 PM 4/25/2019      STAFF ATTENDING PHYSICIAN F/U NOTE:  Taran Hernandes has been evaluated and treated. He reports complete resolution of Sx and is ready to return home. Currently patient reports no dyspnea, itchiness or for further rash. We discussed Sx warranting immediate ED return, which were acknowledged. I recommended F/U and discussion of visit with primary care  physician.  ____________________  Kwasi Crawley MD, [facility]  Emergency Medicine Staff  10:43 PM 4/25/2019                        Clinical Impression:       ICD-10-CM ICD-9-CM   1. Contact dermatitis, unspecified contact dermatitis type, unspecified trigger L25.9 692.9                                Coy Crawley MD  04/26/19 0149

## 2019-10-07 DIAGNOSIS — E78.5 DYSLIPIDEMIA: ICD-10-CM

## 2019-10-07 DIAGNOSIS — I70.0 ATHEROSCLEROSIS OF ABDOMINAL AORTA: ICD-10-CM

## 2019-10-08 ENCOUNTER — PATIENT MESSAGE (OUTPATIENT)
Dept: PRIMARY CARE CLINIC | Facility: CLINIC | Age: 79
End: 2019-10-08

## 2019-10-08 RX ORDER — ATORVASTATIN CALCIUM 10 MG/1
TABLET, FILM COATED ORAL
Qty: 90 TABLET | Refills: 1 | Status: SHIPPED | OUTPATIENT
Start: 2019-10-08 | End: 2020-04-02

## 2019-10-09 ENCOUNTER — OFFICE VISIT (OUTPATIENT)
Dept: INTERNAL MEDICINE | Facility: CLINIC | Age: 79
End: 2019-10-09
Payer: MEDICARE

## 2019-10-09 VITALS
HEIGHT: 64 IN | SYSTOLIC BLOOD PRESSURE: 130 MMHG | DIASTOLIC BLOOD PRESSURE: 80 MMHG | OXYGEN SATURATION: 95 % | BODY MASS INDEX: 29.99 KG/M2 | HEART RATE: 72 BPM | WEIGHT: 175.69 LBS

## 2019-10-09 DIAGNOSIS — R43.2 ALTERED TASTE: Primary | ICD-10-CM

## 2019-10-09 PROCEDURE — 99213 PR OFFICE/OUTPT VISIT, EST, LEVL III, 20-29 MIN: ICD-10-PCS | Mod: HCNC,S$GLB,, | Performed by: INTERNAL MEDICINE

## 2019-10-09 PROCEDURE — 3075F SYST BP GE 130 - 139MM HG: CPT | Mod: HCNC,CPTII,S$GLB, | Performed by: INTERNAL MEDICINE

## 2019-10-09 PROCEDURE — 1101F PR PT FALLS ASSESS DOC 0-1 FALLS W/OUT INJ PAST YR: ICD-10-PCS | Mod: HCNC,CPTII,S$GLB, | Performed by: INTERNAL MEDICINE

## 2019-10-09 PROCEDURE — 99999 PR PBB SHADOW E&M-EST. PATIENT-LVL III: ICD-10-PCS | Mod: PBBFAC,HCNC,, | Performed by: INTERNAL MEDICINE

## 2019-10-09 PROCEDURE — 99213 OFFICE O/P EST LOW 20 MIN: CPT | Mod: HCNC,S$GLB,, | Performed by: INTERNAL MEDICINE

## 2019-10-09 PROCEDURE — 1101F PT FALLS ASSESS-DOCD LE1/YR: CPT | Mod: HCNC,CPTII,S$GLB, | Performed by: INTERNAL MEDICINE

## 2019-10-09 PROCEDURE — 3079F DIAST BP 80-89 MM HG: CPT | Mod: HCNC,CPTII,S$GLB, | Performed by: INTERNAL MEDICINE

## 2019-10-09 PROCEDURE — 99999 PR PBB SHADOW E&M-EST. PATIENT-LVL III: CPT | Mod: PBBFAC,HCNC,, | Performed by: INTERNAL MEDICINE

## 2019-10-09 PROCEDURE — 3079F PR MOST RECENT DIASTOLIC BLOOD PRESSURE 80-89 MM HG: ICD-10-PCS | Mod: HCNC,CPTII,S$GLB, | Performed by: INTERNAL MEDICINE

## 2019-10-09 PROCEDURE — 3075F PR MOST RECENT SYSTOLIC BLOOD PRESS GE 130-139MM HG: ICD-10-PCS | Mod: HCNC,CPTII,S$GLB, | Performed by: INTERNAL MEDICINE

## 2019-10-09 RX ORDER — OMEPRAZOLE 20 MG/1
20 CAPSULE, DELAYED RELEASE ORAL DAILY
COMMUNITY
End: 2020-09-22 | Stop reason: ALTCHOICE

## 2019-10-09 NOTE — TELEPHONE ENCOUNTER
Pt state she has been having a metal taste in his mouth for a couple of weeks now and he is advised to contact urology for slow urine flow   appt scheduled to see Dr Murphy today

## 2019-10-09 NOTE — PROGRESS NOTES
Subjective:       Patient ID: Taran Hernandes is a 78 y.o. male.    Chief Complaint: mouth (pt states that his mouth taste like metal since he started taking a new medication)    78 year old man reports metal taste in mouth since taking OTC herbal med to help his prostate.  He urinates 10 times a night    Review of Systems   Constitutional: Negative for activity change, appetite change and fever.   HENT: Negative for congestion, postnasal drip and sore throat.    Respiratory: Negative for cough, shortness of breath and wheezing.    Cardiovascular: Negative for chest pain and palpitations.   Gastrointestinal: Negative for abdominal pain, blood in stool, constipation, diarrhea, nausea and vomiting.   Genitourinary: Negative for decreased urine volume, difficulty urinating, flank pain and frequency.   Musculoskeletal: Negative for arthralgias.   Neurological: Negative for dizziness, weakness and headaches.       Objective:      Physical Exam   Constitutional: He is oriented to person, place, and time. He appears well-developed and well-nourished. No distress.   HENT:   Head: Normocephalic and atraumatic.   Right Ear: External ear normal.   Left Ear: External ear normal.   Eyes: Pupils are equal, round, and reactive to light. Conjunctivae and EOM are normal.   Neck: Normal range of motion. Neck supple. No thyromegaly present.   Cardiovascular: Normal rate and regular rhythm.   Pulmonary/Chest: Effort normal and breath sounds normal.   Abdominal: Soft. Bowel sounds are normal. He exhibits no mass. There is no tenderness. There is no rebound and no guarding.   Musculoskeletal: Normal range of motion.   Lymphadenopathy:     He has no cervical adenopathy.   Neurological: He is alert and oriented to person, place, and time. He has normal reflexes. He displays normal reflexes. No cranial nerve deficit. He exhibits normal muscle tone. Coordination normal.   Skin: Skin is warm and dry.       Assessment:       1. Altered taste         Plan:   Taran was seen today for mouth.    Diagnoses and all orders for this visit:    Altered taste

## 2019-10-21 ENCOUNTER — OFFICE VISIT (OUTPATIENT)
Dept: UROLOGY | Facility: CLINIC | Age: 79
End: 2019-10-21
Payer: MEDICARE

## 2019-10-21 VITALS
HEART RATE: 79 BPM | SYSTOLIC BLOOD PRESSURE: 139 MMHG | BODY MASS INDEX: 30.11 KG/M2 | WEIGHT: 176.38 LBS | HEIGHT: 64 IN | DIASTOLIC BLOOD PRESSURE: 81 MMHG

## 2019-10-21 DIAGNOSIS — R33.9 URINE RETENTION: ICD-10-CM

## 2019-10-21 DIAGNOSIS — N13.8 BPH WITH URINARY OBSTRUCTION: Primary | ICD-10-CM

## 2019-10-21 DIAGNOSIS — N40.1 BPH WITH URINARY OBSTRUCTION: Primary | ICD-10-CM

## 2019-10-21 PROCEDURE — 3075F PR MOST RECENT SYSTOLIC BLOOD PRESS GE 130-139MM HG: ICD-10-PCS | Mod: HCNC,CPTII,S$GLB, | Performed by: UROLOGY

## 2019-10-21 PROCEDURE — 99999 PR PBB SHADOW E&M-EST. PATIENT-LVL III: ICD-10-PCS | Mod: PBBFAC,HCNC,, | Performed by: UROLOGY

## 2019-10-21 PROCEDURE — 1101F PT FALLS ASSESS-DOCD LE1/YR: CPT | Mod: HCNC,CPTII,S$GLB, | Performed by: UROLOGY

## 2019-10-21 PROCEDURE — 3079F PR MOST RECENT DIASTOLIC BLOOD PRESSURE 80-89 MM HG: ICD-10-PCS | Mod: HCNC,CPTII,S$GLB, | Performed by: UROLOGY

## 2019-10-21 PROCEDURE — 1101F PR PT FALLS ASSESS DOC 0-1 FALLS W/OUT INJ PAST YR: ICD-10-PCS | Mod: HCNC,CPTII,S$GLB, | Performed by: UROLOGY

## 2019-10-21 PROCEDURE — 99999 PR PBB SHADOW E&M-EST. PATIENT-LVL III: CPT | Mod: PBBFAC,HCNC,, | Performed by: UROLOGY

## 2019-10-21 PROCEDURE — 99213 OFFICE O/P EST LOW 20 MIN: CPT | Mod: HCNC,S$GLB,, | Performed by: UROLOGY

## 2019-10-21 PROCEDURE — 3079F DIAST BP 80-89 MM HG: CPT | Mod: HCNC,CPTII,S$GLB, | Performed by: UROLOGY

## 2019-10-21 PROCEDURE — 3075F SYST BP GE 130 - 139MM HG: CPT | Mod: HCNC,CPTII,S$GLB, | Performed by: UROLOGY

## 2019-10-21 PROCEDURE — 99213 PR OFFICE/OUTPT VISIT, EST, LEVL III, 20-29 MIN: ICD-10-PCS | Mod: HCNC,S$GLB,, | Performed by: UROLOGY

## 2019-10-21 NOTE — PROGRESS NOTES
CHIEF COMPLAINT:    Mr. Hernandes is a 78 y.o. male presenting with LUTS.    PRESENTING ILLNESS:    Taran Hernandes is a 78 y.o. male with a history of an elevated PSA s/p a negative TRUS bx in 2013 when his PSA was 4.6.      He c/o LUTS.    He c/o nocturia x 6-8.  + hesitancy.  + intermittency.  + decreased FOS. + frequency.  No dysuria.  No hematuria.  No f/c.  He's on flomax.  However, he presents asking for surgical intervention.  No hematuria.  No dysuria.    He denies ED.      REVIEW OF SYSTEMS:    Taran Hernandes denies any history of headache, blurred vision, fever, nausea, vomiting, chills, abdominal pain, bleeding per rectum, cough, SOB, recent loss of consciousness, recent mental status changes, seizures, dizziness, or upper or lower extremity weakness.    F[place]ON  1. 2  2. 3  3. 2  4. 2  5. 3     PATIENT HISTORY:    Past Medical History:   Diagnosis Date    BPH (benign prostatic hyperplasia)     BPH with urinary obstruction 8/22/2016    DDD (degenerative disc disease), lumbar     Dyslipidemia     Elevated prostate specific antigen (PSA) 8/22/2016    Fatty liver     GERD (gastroesophageal reflux disease)     Hematuria     History of adenomatous polyp of colon 7/17/2015    History of colonic polyps     Hypertension     Lumbar disc disease     Mild vitamin D deficiency     Renal disorder        Past Surgical History:   Procedure Laterality Date    CATARACT EXTRACTION W/  INTRAOCULAR LENS IMPLANT Left     Dr Ruggiero     CATARACT EXTRACTION W/  INTRAOCULAR LENS IMPLANT Right 03/27/16    Dr Ruggiero    CHOLECYSTECTOMY      FRACTURE SURGERY      left ankle    LASIK Bilateral 2016    NEUROMA SURGERY Right 2006       Family History   Problem Relation Age of Onset    Cancer Mother         complications of c-scope/ suspect colon cancer    Hypertension Father     Diabetes Father     Heart disease Father     Cancer Brother         liver/ etoh    Hypertension Brother     No Known Problems Daughter      Heart attack Son     No Known Problems Son     No Known Problems Son     No Known Problems Son     Melanoma Neg Hx     Blindness Neg Hx     Glaucoma Neg Hx     Macular degeneration Neg Hx     Retinal detachment Neg Hx        Social History     Socioeconomic History    Marital status:      Spouse name: Not on file    Number of children: Not on file    Years of education: Not on file    Highest education level: Not on file   Occupational History    Occupation:     Social Needs    Financial resource strain: Not on file    Food insecurity:     Worry: Not on file     Inability: Not on file    Transportation needs:     Medical: Not on file     Non-medical: Not on file   Tobacco Use    Smoking status: Never Smoker    Smokeless tobacco: Never Used   Substance and Sexual Activity    Alcohol use: Yes     Comment: A couple of beers a week    Drug use: No    Sexual activity: Yes     Partners: Female   Lifestyle    Physical activity:     Days per week: Not on file     Minutes per session: Not on file    Stress: Not on file   Relationships    Social connections:     Talks on phone: Not on file     Gets together: Not on file     Attends Muslim service: Not on file     Active member of club or organization: Not on file     Attends meetings of clubs or organizations: Not on file     Relationship status: Not on file   Other Topics Concern    Not on file   Social History Narrative    Not on file       Allergies:  No known allergies    Medications:    Current Outpatient Medications:     acetaminophen (TYLENOL EXTRA STRENGTH) 500 MG tablet, Take 500 mg by mouth every 6 (six) hours as needed for Pain., Disp: , Rfl:     amLODIPine (NORVASC) 10 MG tablet, Take 1 tablet (10 mg total) by mouth once daily., Disp: 90 tablet, Rfl: 3    aspirin (ECOTRIN) 81 MG EC tablet, Take 81 mg by mouth once daily., Disp: , Rfl:     atorvastatin (LIPITOR) 10 MG tablet, TAKE 1 TABLET(10 MG) BY MOUTH EVERY  DAY, Disp: 90 tablet, Rfl: 1    blood pressure monitor Kit, Use as directed., Disp: 1 each, Rfl: 0    FLUZONE HIGH-DOSE 2019-20, PF, 180 mcg/0.5 mL Syrg, ADM 0.5ML IM UTD, Disp: , Rfl: 0    losartan (COZAAR) 100 MG tablet, Take 1 tablet (100 mg total) by mouth once daily., Disp: 90 tablet, Rfl: 3    omeprazole (PRILOSEC) 20 MG capsule, Take 20 mg by mouth once daily., Disp: , Rfl:     tamsulosin (FLOMAX) 0.4 mg Cap, Take 1 capsule (0.4 mg total) by mouth once daily., Disp: 90 capsule, Rfl: 3    PHYSICAL EXAMINATION:    The patient generally appears in good health, is appropriately interactive, and is in no apparent distress.     Eyes: anicteric sclerae, moist conjunctivae; no lid-lag; PERRLA     HENT: Atraumatic; oropharynx clear with moist mucous membranes and no mucosal ulcerations;normal hard and soft palate.  No evidence of lymphadenopathy.    Neck: Trachea midline.  No thyromegaly.    Musculoskeletal: No abnormal gait.    Skin: No lesions.    Mental: Cooperative with normal affect.  Is oriented to time, place, and person.    Neuro: Grossly intact.    Chest: Normal inspiratory effort.   No accessory muscles.  No audible wheezes.  Respirations symmetric on inspiration and expiration.    Heart: Regular rhythm.      Abdomen:  Soft, non-tender. No masses or organomegaly. Bladder is not palpable. No evidence of flank discomfort. No evidence of inguinal hernia.    Genitourinary: The penis is not circumcised with no evidence of plaques or induration. The urethral meatus is normal. The testes, epididymides, and cord structures are normal in size and contour bilaterally. The scrotum is normal in size and contour.    Normal anal sphincter tone. No rectal mass.    The prostate is 40 g. Normal landmarks. Lateral sulci. Median furrow intact.  No nodularity or induration. Seminal vesicles are normal.    Extremities: No clubbing, cyanosis, or edema      LABS:      Lab Results   Component Value Date    PSA 4.2 (H)  07/20/2016    PSA 4.9 (H) 06/03/2015    PSA 5.4 (H) 05/27/2014       IMPRESSION:    Encounter Diagnoses   Name Primary?    BPH with urinary obstruction Yes         PLAN:    1. Would not recommend checking any further PSA's based off age and life expectancy.  2. Continue flomax.  3. Will plan for suds/cysto. I  have explained the risk, benefits, and alternatives of the procedure in detail. The patient voices understanding and all questions have been answered. The patient agrees to proceed as planned.      Copy to:

## 2019-11-11 ENCOUNTER — TELEPHONE (OUTPATIENT)
Dept: UROLOGY | Facility: CLINIC | Age: 79
End: 2019-11-11

## 2019-11-11 NOTE — TELEPHONE ENCOUNTER
Message left on pts voicemail informing appt for suds procedure needed to be rescheduled because  had death in family.

## 2019-12-13 ENCOUNTER — TELEPHONE (OUTPATIENT)
Dept: UROLOGY | Facility: CLINIC | Age: 79
End: 2019-12-13

## 2019-12-13 ENCOUNTER — PROCEDURE VISIT (OUTPATIENT)
Dept: UROLOGY | Facility: CLINIC | Age: 79
End: 2019-12-13
Payer: MEDICARE

## 2019-12-13 VITALS
TEMPERATURE: 98 F | BODY MASS INDEX: 29.88 KG/M2 | SYSTOLIC BLOOD PRESSURE: 152 MMHG | WEIGHT: 175 LBS | DIASTOLIC BLOOD PRESSURE: 84 MMHG | HEIGHT: 64 IN | HEART RATE: 84 BPM

## 2019-12-13 DIAGNOSIS — R33.9 URINE RETENTION: ICD-10-CM

## 2019-12-13 DIAGNOSIS — N13.8 BPH WITH URINARY OBSTRUCTION: Primary | ICD-10-CM

## 2019-12-13 DIAGNOSIS — N40.1 BPH WITH URINARY OBSTRUCTION: Primary | ICD-10-CM

## 2019-12-13 PROCEDURE — 51741 PR UROFLOWMETRY, COMPLEX: ICD-10-PCS | Mod: 26,HCNC,51,S$GLB | Performed by: UROLOGY

## 2019-12-13 PROCEDURE — 51784 ANAL/URINARY MUSCLE STUDY: CPT | Mod: 26,HCNC,S$GLB, | Performed by: UROLOGY

## 2019-12-13 PROCEDURE — 51797 PR VOIDING PRESS STUDY INTRA-ABDOMINAL VOID: ICD-10-PCS | Mod: 26,HCNC,S$GLB, | Performed by: UROLOGY

## 2019-12-13 PROCEDURE — 52000 CYSTOURETHROSCOPY: CPT | Mod: HCNC,S$GLB,, | Performed by: UROLOGY

## 2019-12-13 PROCEDURE — 51728 PR COMPLEX CYSTOMETROGRAM VOIDING PRESSURE STUDIES: ICD-10-PCS | Mod: 26,HCNC,51,S$GLB | Performed by: UROLOGY

## 2019-12-13 PROCEDURE — 51784 PR ANAL/URINARY MUSCLE STUDY: ICD-10-PCS | Mod: 26,HCNC,S$GLB, | Performed by: UROLOGY

## 2019-12-13 PROCEDURE — 51741 ELECTRO-UROFLOWMETRY FIRST: CPT | Mod: 26,HCNC,51,S$GLB | Performed by: UROLOGY

## 2019-12-13 PROCEDURE — 51797 INTRAABDOMINAL PRESSURE TEST: CPT | Mod: 26,HCNC,S$GLB, | Performed by: UROLOGY

## 2019-12-13 PROCEDURE — 52000 PR CYSTOURETHROSCOPY: ICD-10-PCS | Mod: HCNC,S$GLB,, | Performed by: UROLOGY

## 2019-12-13 PROCEDURE — 51728 CYSTOMETROGRAM W/VP: CPT | Mod: 26,HCNC,51,S$GLB | Performed by: UROLOGY

## 2019-12-13 NOTE — PATIENT INSTRUCTIONS
SIMPLE URODYNAMIC STUDY (SUDS) & CYSTOSCOPY  UROLOGY CLINIC DISCHARGE INSTRUCTIONS    You have had a procedure that will require time to properly heal. Follow the instructions you have been given on how to care for yourself once you are home. Below is additional information to help in your recovery.    ACTIVITY  · There are no restrictions in activity. Start doing again the things you did before the procedure.  · You may experience a slight burning sensation. You may notice a small amount of blood in your urine. This will clear up within a day. Call the clinic if this continues beyond 48 hours.    DIET  · Continue your normal diet. You may eat the same foods you ate before your procedure.  · Drink plenty of fluids during the first 24-48 hours following your procedure.    MEDICATIONS  · Resume all other previous medications from your prescribing physician.  · Continue any pre=procedure antibiotics until they are all gone.    SIGNS AND SYMPTOMS TO REPORT TO THE DOCTOR  · Chills or fever greater than 101° F within 24 hours of procedure.  · Changes in urination, such as increased bleeding, foul smell, cloudy urine, or painful urination.  · Call your doctor with any questions or concerns.    For any emergency situation, call [phone] immediately or go to your nearest emergency room.    [facility]  [phone]    _                                                                                                                                                                                             If any problems after hours or weekends, you may call [phone] and ask for the urology resident on call.

## 2019-12-13 NOTE — PROCEDURES
Procedures   Surgeon: Christine    Assistant: None    Procedure performed: 1. Simple Urodynamic Study (Complex uroflometry, complex CMG, Intraabdominal voiding pressure study)                                          2. cystoscopy  Blood loss: None    Specimen: None    Procedure in detail:     Urodynamic Report    Indication:    Patient was taken to the Urodynamic Suite with a comfortably full bladder and asked to perform a free uroflow.  He voided too little to record.  Next, the patient was prepped and the urinary residual was drained with a 14 Fr catheter.  A 7 Fr dual lumen catheter was placed to measure intravesical pressures.  A 10 Fr balloon manometer was placed into the rectum for abdominal pressure measurements.  Patch EMG electrodes were placed on the perineum.  The patient was connected to the urodynamics machine, using a multichannel technique.  The bladder was filled with sterile water at room temperature at a rate of 30 ml/min.   Filling is performed with the patient in the seated position.  The patient was then asked to void for a pressure flow study.    The following are the results of the study:  1.  Uroflow        Q max: n/a cc/s       Voided volume: n/a cc       Pattern of the curve: n/a    2.  PVR: n/a    3.  CMG       Sensation:         First Desire: 139 cc         Normal Desire: 169 cc         Strong Desire: 195 cc         Urgency: 229 cc       Capacity: 230 cc       Abnormal Contractions: none       Compliance: normal    4.  Abdominal Leak Point Pressure:       Valsalva: n/a       Cough: n/a    5.  EMG: normal    6.  Voiding phase       Q max: 9.7 cc/s       P det at Q max: 46.4 cm H2O       Pattern of the curve: saw tooth        Voided volume: 69 cc       PVR: 160 cc    7.  Analysis: MARI    8.  Impression: MARI      After performing the SUDS, the flexible cystoscope was assembled and passed into the bladder using standard sterile technique.  The prostatic length was approximately  5.5 cm with a  median lobe.

## 2020-01-08 ENCOUNTER — PES CALL (OUTPATIENT)
Dept: ADMINISTRATIVE | Facility: CLINIC | Age: 80
End: 2020-01-08

## 2020-02-03 ENCOUNTER — OFFICE VISIT (OUTPATIENT)
Dept: PRIMARY CARE CLINIC | Facility: CLINIC | Age: 80
End: 2020-02-03
Payer: MEDICARE

## 2020-02-03 ENCOUNTER — IMMUNIZATION (OUTPATIENT)
Dept: PHARMACY | Facility: CLINIC | Age: 80
End: 2020-02-03
Payer: MEDICARE

## 2020-02-03 VITALS
BODY MASS INDEX: 29.35 KG/M2 | HEIGHT: 64 IN | HEART RATE: 83 BPM | OXYGEN SATURATION: 95 % | SYSTOLIC BLOOD PRESSURE: 120 MMHG | WEIGHT: 171.94 LBS | DIASTOLIC BLOOD PRESSURE: 76 MMHG

## 2020-02-03 DIAGNOSIS — I70.0 ATHEROSCLEROSIS OF ABDOMINAL AORTA: ICD-10-CM

## 2020-02-03 DIAGNOSIS — N18.30 CKD (CHRONIC KIDNEY DISEASE), STAGE III: ICD-10-CM

## 2020-02-03 DIAGNOSIS — N40.1 BPH WITH URINARY OBSTRUCTION: ICD-10-CM

## 2020-02-03 DIAGNOSIS — N13.8 BPH WITH URINARY OBSTRUCTION: ICD-10-CM

## 2020-02-03 DIAGNOSIS — Z00.00 ENCOUNTER FOR PREVENTIVE HEALTH EXAMINATION: Primary | ICD-10-CM

## 2020-02-03 DIAGNOSIS — I10 ESSENTIAL HYPERTENSION: ICD-10-CM

## 2020-02-03 DIAGNOSIS — E78.5 HYPERLIPIDEMIA, UNSPECIFIED HYPERLIPIDEMIA TYPE: ICD-10-CM

## 2020-02-03 PROCEDURE — 3078F DIAST BP <80 MM HG: CPT | Mod: HCNC,CPTII,S$GLB, | Performed by: NURSE PRACTITIONER

## 2020-02-03 PROCEDURE — 99499 UNLISTED E&M SERVICE: CPT | Mod: HCNC,S$GLB,, | Performed by: NURSE PRACTITIONER

## 2020-02-03 PROCEDURE — 99499 RISK ADDL DX/OHS AUDIT: ICD-10-PCS | Mod: HCNC,S$GLB,, | Performed by: NURSE PRACTITIONER

## 2020-02-03 PROCEDURE — G0439 PPPS, SUBSEQ VISIT: HCPCS | Mod: HCNC,S$GLB,, | Performed by: NURSE PRACTITIONER

## 2020-02-03 PROCEDURE — G0439 PR MEDICARE ANNUAL WELLNESS SUBSEQUENT VISIT: ICD-10-PCS | Mod: HCNC,S$GLB,, | Performed by: NURSE PRACTITIONER

## 2020-02-03 PROCEDURE — 3074F PR MOST RECENT SYSTOLIC BLOOD PRESSURE < 130 MM HG: ICD-10-PCS | Mod: HCNC,CPTII,S$GLB, | Performed by: NURSE PRACTITIONER

## 2020-02-03 PROCEDURE — 99999 PR PBB SHADOW E&M-EST. PATIENT-LVL IV: ICD-10-PCS | Mod: PBBFAC,HCNC,, | Performed by: NURSE PRACTITIONER

## 2020-02-03 PROCEDURE — 3074F SYST BP LT 130 MM HG: CPT | Mod: HCNC,CPTII,S$GLB, | Performed by: NURSE PRACTITIONER

## 2020-02-03 PROCEDURE — 99999 PR PBB SHADOW E&M-EST. PATIENT-LVL IV: CPT | Mod: PBBFAC,HCNC,, | Performed by: NURSE PRACTITIONER

## 2020-02-03 PROCEDURE — 3078F PR MOST RECENT DIASTOLIC BLOOD PRESSURE < 80 MM HG: ICD-10-PCS | Mod: HCNC,CPTII,S$GLB, | Performed by: NURSE PRACTITIONER

## 2020-02-03 RX ORDER — CHOLECALCIFEROL (VITAMIN D3) 25 MCG
1000 TABLET ORAL DAILY
COMMUNITY

## 2020-02-03 NOTE — PROGRESS NOTES
"Taran Hernandes presented for a  Medicare AWV and comprehensive Health Risk Assessment today. The following components were reviewed and updated:    · Medical history  · Family History  · Social history  · Allergies and Current Medications  · Health Risk Assessment  · Health Maintenance  · Care Team     ** See Completed Assessments for Annual Wellness Visit within the encounter summary.**       The following assessments were completed:  · Living Situation  · CAGE  · Depression Screening  · Timed Get Up and Go  · Whisper Test  · Cognitive Function Screening  · Nutrition Screening  · ADL Screening  · PAQ Screening    Vitals:    02/03/20 0852   BP: 120/76   Pulse: 83   SpO2: 95%   Weight: 78 kg (171 lb 15.3 oz)   Height: 5' 4" (1.626 m)     Body mass index is 29.52 kg/m².  Physical Exam   Constitutional: He is oriented to person, place, and time. He appears well-developed and well-nourished. No distress.   HENT:   Head: Normocephalic and atraumatic.   Eyes: No scleral icterus.   Neck: Normal range of motion. Neck supple.   Cardiovascular: Normal rate, regular rhythm, normal heart sounds and intact distal pulses.   Pulmonary/Chest: Effort normal and breath sounds normal. No stridor. No respiratory distress. He has no wheezes. He has no rales.   Abdominal: Soft. Bowel sounds are normal. He exhibits no distension and no mass. There is no tenderness. There is no rebound and no guarding.   Musculoskeletal: He exhibits no edema.   Neurological: He is alert and oriented to person, place, and time.   Skin: Skin is warm and dry. He is not diaphoretic.   Psychiatric: He has a normal mood and affect. His behavior is normal. Thought content normal.   Nursing note and vitals reviewed.        Diagnoses and health risks identified today and associated recommendations/orders:    1. Encounter for preventive health examination  - Screenings performed, as noted above. Personal preventative testing needs reviewed.     2. Essential " hypertension  - Hypertension Digital Medicine ([facility]) Enrollment Order  - Hypertension Digital Medicine ([facility]): Assign Onboarding Questionnaires    3. Hyperlipidemia, unspecified hyperlipidemia type  - Controlled, followed by PCP    4. Atherosclerosis of abdominal aorta  - Stable, BP and lipids are controlled    5. CKD (chronic kidney disease), stage III  - Stable, followed by PCP    6. BPH with urinary obstruction  - Has TURP scheduled on 3/2/20      Provided Taran with a 5-10 year written screening schedule and personal prevention plan. Recommendations were developed using the USPSTF age appropriate recommendations. Education, counseling, and referrals were provided as needed. After Visit Summary printed and given to patient which includes a list of additional screenings\tests needed.    No follow-ups on file.    Erin Lou NP  I offered to discuss end of life issues, including information on how to make advance directives that the patient could use to name someone who would make medical decisions on their behalf if they became too ill to make themselves.    ___Patient declined  _X_Patient is interested, I provided paper work and offered to discuss.

## 2020-02-03 NOTE — PATIENT INSTRUCTIONS
Counseling and Referral of Other Preventative  (Italic type indicates deductible and co-insurance are waived)    Patient Name: Taran Hernandes  Today's Date: 2/3/2020    Health Maintenance       Date Due Completion Date    Shingles Vaccine (1 of 2) 11/27/1989 ---    Colonoscopy 07/17/2020 7/17/2015    Lipid Panel 03/28/2024 3/28/2019    TETANUS VACCINE 03/27/2029 3/27/2019        No orders of the defined types were placed in this encounter.    The following information is provided to all patients.  This information is to help you find resources for any of the problems found today that may be affecting your health:                Living healthy guide: www.[facility].[place].gov      Understanding Diabetes: www.diabetes.org      Eating healthy: www.cdc.gov/healthyweight      CDC home safety checklist: www.cdc.gov/steadi/patient.html      Agency on Aging: www.goea.[place].[facility]      Alcoholics anonymous (AA): www.aa.org      Physical Activity: www.christina.nih.gov/ky9fslq      Tobacco use: www.quitwithusla.org

## 2020-02-18 NOTE — ANESTHESIA PAT ROS NOTE
02/18/2020  Taran Hernandes is a 79 y.o., male.      Pre-op Assessment         Review of Systems  Anesthesia Hx:  No problems with previous Anesthesia  Denies Family Hx of Anesthesia complications.   Denies Personal Hx of Anesthesia complications.   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      Nelly Ibrahim, RN 2/18/20    Anesthesia Assessment: Preoperative EQUATION    Planned Procedure: Procedure(s) (LRB):  TURP (TRANSURETHRAL RESECTION OF PROSTATE) (N/A)  Requested Anesthesia Type:General  Surgeon: Vincent King MD  Service: Urology  Known or anticipated Date of Surgery:3/3/2020    Surgeon notes: reviewed and BPH with urinary obstruction     Previous anesthesia records:11/14/16-Esophagogastroduodenoscopy(EGD)-General-no apparent anesthetic complications    Anesthesia Hx:  No problems with previous Anesthesia  History of prior surgery of interest to airway management or planning: Denies Family Hx of Anesthesia complications.   Denies Personal Hx of Anesthesia complications.     Last PCP note: within 1 month , within [facility] , Recent visit Primary Care-2/3/20-Annual Exam-NP-Erin Lou/ [facility] PCP--3/27/19-Annual Exam  Subspecialty notes: Ortho, Optometry/Otolaryngology    Other important co-morbidities: GERD and HTN      Assessment:       1. Essential hypertension    2. Pre-diabetes    3. Dyslipidemia    4. Atherosclerosis of abdominal aorta    5. CKD (chronic kidney disease) stage 3,  "GFR 30-59 ml/min    6. Fatty liver    7. Gastroesophageal reflux disease, esophagitis presence not specified    8. Need for diphtheria-tetanus-pertussis (Tdap) vaccine        Tests already available:  No recent tests.       Plan:  Phone pending    Testing:  A1C, BMP, EKG, Hematology Profile, PT/INR and PTT        Patient  has previously scheduled Medical Appointment:Appointment on 2/26/20, prior to the surgery date.    Navigation: Phone Completed                       Tests Scheduled. Lab-Hem Prof/BMP/A1c/PT-INR/APTT/EKG on 2/21/20 @ 2p & 2:45p-pending             Consults scheduled. NO POC needed.[facility] IM- NP-recent visit for Annual Exam with Erin Keene-Sending request for "Clearance" & baby Aspirin preop guidelines from [facility] PCP--pending             Results will be tracked by Preop Clinic.              Nelly Ibrahim, RN 2/18/20    Addendum: [facility]  -"Clearance" appt. on 2/21/20, @ 3p & baby Aspirin guidelines requested-Pending. Nelly Ibrahim RN 2/20/20    Addendum:Per -   Pre-op evaluation for TURO to help with BPH with urinary obstruction - Dr King  -cleared for surgery with benefits>risks.  -OK to hold ASA for whatever time period surgeon requires  -with CKD3 important to maintain good hydration by IV if po not possible   -a copy of this note will be sent to the requesting surgeon.  ANAID Burrows     2/26/2020-Per -Hold Asa 7 days prior to procedure See Chart.  updated patient with Asa instructions ANAID Burrows.   "

## 2020-02-18 NOTE — PRE-PROCEDURE INSTRUCTIONS
Preop screen completed.  Preop instructions(bathing/fasting/directions/location of surgery/ and preop medication instructions reviewed with patient).  Patient instructed to hold/stop all blood thinning medications, prior to surgery, following the pre-surgery recommended guidelines.Preop guidelines for baby Aspirin, from  -Dr. Miranda(visit on 2/21/20 & Dr. Rios-chart note on   2/26/20 -ok to hold x7 days prior to surgery.  Patient verbalized understanding.

## 2020-02-19 ENCOUNTER — PATIENT MESSAGE (OUTPATIENT)
Dept: PRIMARY CARE CLINIC | Facility: CLINIC | Age: 80
End: 2020-02-19

## 2020-02-20 ENCOUNTER — TELEPHONE (OUTPATIENT)
Dept: PREADMISSION TESTING | Facility: HOSPITAL | Age: 80
End: 2020-02-20

## 2020-02-20 NOTE — TELEPHONE ENCOUNTER
----- Message from Nelly Ibrahim RN sent at 2/19/2020 11:45 AM CST -----  Regarding: preop appts.  Sx date-3/3-Need:Labs-Hem Prof/BMP/A1c/PT-INR/APTT/EKG(ordered). Thank you. JENI

## 2020-02-21 ENCOUNTER — OFFICE VISIT (OUTPATIENT)
Dept: INTERNAL MEDICINE | Facility: CLINIC | Age: 80
End: 2020-02-21
Payer: MEDICARE

## 2020-02-21 ENCOUNTER — HOSPITAL ENCOUNTER (OUTPATIENT)
Dept: CARDIOLOGY | Facility: CLINIC | Age: 80
Discharge: HOME | End: 2020-02-21
Attending: ANESTHESIOLOGY
Payer: MEDICARE

## 2020-02-21 VITALS
SYSTOLIC BLOOD PRESSURE: 116 MMHG | BODY MASS INDEX: 30.04 KG/M2 | OXYGEN SATURATION: 96 % | HEART RATE: 73 BPM | DIASTOLIC BLOOD PRESSURE: 68 MMHG | WEIGHT: 175.94 LBS | HEIGHT: 64 IN

## 2020-02-21 DIAGNOSIS — N13.8 BPH WITH URINARY OBSTRUCTION: ICD-10-CM

## 2020-02-21 DIAGNOSIS — R73.03 PRE-DIABETES: ICD-10-CM

## 2020-02-21 DIAGNOSIS — N18.30 CKD (CHRONIC KIDNEY DISEASE), STAGE III: ICD-10-CM

## 2020-02-21 DIAGNOSIS — Z01.818 PREOP TESTING: ICD-10-CM

## 2020-02-21 DIAGNOSIS — N40.1 BPH WITH URINARY OBSTRUCTION: ICD-10-CM

## 2020-02-21 DIAGNOSIS — I70.0 ATHEROSCLEROSIS OF ABDOMINAL AORTA: ICD-10-CM

## 2020-02-21 DIAGNOSIS — E78.5 HYPERLIPIDEMIA, UNSPECIFIED HYPERLIPIDEMIA TYPE: ICD-10-CM

## 2020-02-21 DIAGNOSIS — Z01.818 PRE-OP EVALUATION: Primary | ICD-10-CM

## 2020-02-21 DIAGNOSIS — I10 ESSENTIAL HYPERTENSION: ICD-10-CM

## 2020-02-21 PROCEDURE — 99999 PR PBB SHADOW E&M-EST. PATIENT-LVL IV: ICD-10-PCS | Mod: PBBFAC,HCNC,, | Performed by: FAMILY MEDICINE

## 2020-02-21 PROCEDURE — 1101F PT FALLS ASSESS-DOCD LE1/YR: CPT | Mod: HCNC,CPTII,S$GLB, | Performed by: FAMILY MEDICINE

## 2020-02-21 PROCEDURE — 1159F PR MEDICATION LIST DOCUMENTED IN MEDICAL RECORD: ICD-10-PCS | Mod: HCNC,S$GLB,, | Performed by: FAMILY MEDICINE

## 2020-02-21 PROCEDURE — 3078F PR MOST RECENT DIASTOLIC BLOOD PRESSURE < 80 MM HG: ICD-10-PCS | Mod: HCNC,CPTII,S$GLB, | Performed by: FAMILY MEDICINE

## 2020-02-21 PROCEDURE — 93005 EKG 12-LEAD: ICD-10-PCS | Mod: HCNC,S$GLB,, | Performed by: ANESTHESIOLOGY

## 2020-02-21 PROCEDURE — 93010 ELECTROCARDIOGRAM REPORT: CPT | Mod: HCNC,S$GLB,, | Performed by: INTERNAL MEDICINE

## 2020-02-21 PROCEDURE — 99213 PR OFFICE/OUTPT VISIT, EST, LEVL III, 20-29 MIN: ICD-10-PCS | Mod: HCNC,S$GLB,, | Performed by: FAMILY MEDICINE

## 2020-02-21 PROCEDURE — 3074F PR MOST RECENT SYSTOLIC BLOOD PRESSURE < 130 MM HG: ICD-10-PCS | Mod: HCNC,CPTII,S$GLB, | Performed by: FAMILY MEDICINE

## 2020-02-21 PROCEDURE — 99999 PR PBB SHADOW E&M-EST. PATIENT-LVL IV: CPT | Mod: PBBFAC,HCNC,, | Performed by: FAMILY MEDICINE

## 2020-02-21 PROCEDURE — 1159F MED LIST DOCD IN RCRD: CPT | Mod: HCNC,S$GLB,, | Performed by: FAMILY MEDICINE

## 2020-02-21 PROCEDURE — 3074F SYST BP LT 130 MM HG: CPT | Mod: HCNC,CPTII,S$GLB, | Performed by: FAMILY MEDICINE

## 2020-02-21 PROCEDURE — 93005 ELECTROCARDIOGRAM TRACING: CPT | Mod: HCNC,S$GLB,, | Performed by: ANESTHESIOLOGY

## 2020-02-21 PROCEDURE — 93010 EKG 12-LEAD: ICD-10-PCS | Mod: HCNC,S$GLB,, | Performed by: INTERNAL MEDICINE

## 2020-02-21 PROCEDURE — 1126F AMNT PAIN NOTED NONE PRSNT: CPT | Mod: HCNC,S$GLB,, | Performed by: FAMILY MEDICINE

## 2020-02-21 PROCEDURE — 1101F PR PT FALLS ASSESS DOC 0-1 FALLS W/OUT INJ PAST YR: ICD-10-PCS | Mod: HCNC,CPTII,S$GLB, | Performed by: FAMILY MEDICINE

## 2020-02-21 PROCEDURE — 99213 OFFICE O/P EST LOW 20 MIN: CPT | Mod: HCNC,S$GLB,, | Performed by: FAMILY MEDICINE

## 2020-02-21 PROCEDURE — 1126F PR PAIN SEVERITY QUANTIFIED, NO PAIN PRESENT: ICD-10-PCS | Mod: HCNC,S$GLB,, | Performed by: FAMILY MEDICINE

## 2020-02-21 PROCEDURE — 3078F DIAST BP <80 MM HG: CPT | Mod: HCNC,CPTII,S$GLB, | Performed by: FAMILY MEDICINE

## 2020-02-21 NOTE — PROGRESS NOTES
Subjective:       Patient ID: Taran Hernandes is a 79 y.o. male.    Chief Complaint: No chief complaint on file.    Patient is here for pre-op clearance for TURP, Dr King  Labs done 2/21/2020 reviewed and adequate for surgery  PCP Rosita Rios MD     In Dec of 2011 had atypical chest pains and his doc sent him for a stress echo that was negative for ischemic Dz and also negative for CHF (good EF and no diasolic dysfunction.  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      amLODIPine (NORVASC) 10 MG tablet Take 1 tablet (10 mg total) by mouth once daily. 90 tablet 3    aspirin (ECOTRIN) 81 MG EC tablet Take 81 mg by mouth once daily.       atorvastatin (LIPITOR) 10 MG tablet TAKE 1 TABLET(10 MG) BY MOUTH EVERY DAY 90 tablet 1    blood pressure monitor Kit Use as directed. 1 each 0    losartan (COZAAR) 100 MG tablet Take 1 tablet (100 mg total) by mouth once daily. 90 tablet 3    omeprazole (PRILOSEC) 20 MG capsule Take 20 mg by mouth once daily.      tamsulosin (FLOMAX) 0.4 mg Cap Take 1 capsule (0.4 mg total) by mouth once daily. 90 capsule 3    vitamin D (VITAMIN D3) 1000 units Tab Take 1,000 Units by  mouth once daily.       No current facility-administered medications on file prior to visit.      Results for ALISON PATTON (MRN [id number]) as of 2/21/2020 14:50   Ref. Range 2/21/2020 14:08   WBC Latest Ref Range: 3.90 - 12.70 K/uL 9.55   RBC Latest Ref Range: 4.60 - 6.20 M/uL 4.90   Hemoglobin Latest Ref Range: 14.0 - 18.0 g/dL 14.0   Hematocrit Latest Ref Range: 40.0 - 54.0 % 44.0   MCV Latest Ref Range: 82 - 98 fL 90   MCH Latest Ref Range: 27.0 - 31.0 pg 28.6   MCHC Latest Ref Range: 32.0 - 36.0 g/dL 31.8 (L)   RDW Latest Ref Range: 11.5 - 14.5 % 13.8   Platelets Latest Ref Range: 150 - 350 K/uL 201   MPV Latest Ref Range: 9.2 - 12.9 fL 9.5   Protime Latest Ref Range: 9.0 - 12.5 sec 10.7   INR Latest Ref Range: 0.8 - 1.2  1.0   aPTT Latest Ref Range: 21.0 - 32.0 sec 24.2   Sodium Latest Ref Range: 136 - 145 mmol/L 143   Potassium Latest Ref Range: 3.5 - 5.1 mmol/L 4.3   Chloride Latest Ref Range: 95 - 110 mmol/L 108   CO2 Latest Ref Range: 23 - 29 mmol/L 25   Anion Gap Latest Ref Range: 8 - 16 mmol/L 10   BUN, Bld Latest Ref Range: 8 - 23 mg/dL 13   Creatinine Latest Ref Range: 0.5 - 1.4 mg/dL 1.4   eGFR if non African American Latest Ref Range: >60 mL/min/1.73 m^2 47.1 (A)   eGFR if African American Latest Ref Range: >60 mL/min/1.73 m^2 54.5 (A)   Glucose Latest Ref Range: 70 - 110 mg/dL 127 (H)   Calcium Latest Ref Range: 8.7 - 10.5 mg/dL 9.2   Hemoglobin A1C External Latest Ref Range: 4.0 - 5.6 % 5.9 (H)   Estimated Avg Glucose Latest Ref Range: 68 - 131 mg/dL 123       Review of Systems   Constitutional: Negative for chills and fatigue.   HENT: Negative for ear pain.    Eyes: Negative for pain.   Respiratory: Negative for chest tightness.    Cardiovascular: Negative for chest pain.   Gastrointestinal: Negative for abdominal pain.   Genitourinary: Negative for flank pain.   Musculoskeletal: Negative for gait problem.   Neurological: Negative for syncope.   Psychiatric/Behavioral: Negative for behavioral problems.  "      Objective:     /68 (BP Location: Left arm, Patient Position: Sitting, BP Method: Large (Manual))   Pulse 73   Ht 5' 4" (1.626 m)   Wt 79.8 kg (175 lb 14.8 oz)   SpO2 96%   BMI 30.20 kg/m²     Physical Exam   Constitutional: He appears well-developed.   HENT:   Head: Normocephalic and atraumatic.   Right Ear: External ear normal.   Left Ear: External ear normal.   Mouth/Throat: Oropharynx is clear and moist. No oropharyngeal exudate.   Eyes: EOM are normal.   Neck: Neck supple.   Cardiovascular: Normal rate and normal heart sounds.   Pulmonary/Chest: Breath sounds normal. No respiratory distress. He has no wheezes. He has no rales.   Abdominal: Soft.   Musculoskeletal: He exhibits no edema.   Neurological: He is alert.   Skin: Skin is dry.   Psychiatric: His behavior is normal.       Assessment:       1. Pre-op evaluation    2. BPH with urinary obstruction    3. Essential hypertension    4. Hyperlipidemia, unspecified hyperlipidemia type    5. Atherosclerosis of abdominal aorta    6. CKD (chronic kidney disease), stage III    7. Pre-diabetes        Plan:       Taran was seen today for pre-op exam.    Diagnoses and all orders for this visit:    Pre-op evaluation for TURO to help with BPH with urinary obstruction - Dr King  -cleared for surgery with benefits>risks.  -OK to hold ASA for whatever time period surgeon requires  -with CKD3 important to maintain good hydration by IV if po not possible   -a copy of this note will be sent to the requesting surgeon    Essential hypertension  -controlled, stable, no change in meds    Hyperlipidemia, unspecified hyperlipidemia type  -on statin    Atherosclerosis of abdominal aorta  -on statin    CKD (chronic kidney disease), stage III  EGFR=47, stable    Pre-diabetes  MkiT5p=8.9 stable        "

## 2020-02-26 ENCOUNTER — TELEPHONE (OUTPATIENT)
Dept: PREADMISSION TESTING | Facility: HOSPITAL | Age: 80
End: 2020-02-26

## 2020-02-26 ENCOUNTER — TELEPHONE (OUTPATIENT)
Dept: INTERNAL MEDICINE | Facility: CLINIC | Age: 80
End: 2020-02-26

## 2020-02-26 ENCOUNTER — OFFICE VISIT (OUTPATIENT)
Dept: UROLOGY | Facility: CLINIC | Age: 80
End: 2020-02-26
Payer: MEDICARE

## 2020-02-26 DIAGNOSIS — N40.1 BENIGN PROSTATIC HYPERPLASIA WITH LOWER URINARY TRACT SYMPTOMS, SYMPTOM DETAILS UNSPECIFIED: Primary | ICD-10-CM

## 2020-02-26 PROCEDURE — 99499 NO LOS: ICD-10-PCS | Mod: HCNC,S$GLB,, | Performed by: UROLOGY

## 2020-02-26 PROCEDURE — 99999 PR PBB SHADOW E&M-EST. PATIENT-LVL I: ICD-10-PCS | Mod: PBBFAC,HCNC,,

## 2020-02-26 PROCEDURE — 99499 UNLISTED E&M SERVICE: CPT | Mod: HCNC,S$GLB,, | Performed by: UROLOGY

## 2020-02-26 PROCEDURE — 87086 URINE CULTURE/COLONY COUNT: CPT | Mod: HCNC

## 2020-02-26 PROCEDURE — 99999 PR PBB SHADOW E&M-EST. PATIENT-LVL I: CPT | Mod: PBBFAC,HCNC,,

## 2020-02-26 NOTE — TELEPHONE ENCOUNTER
----- Message from Vincent King MD sent at 2/26/2020 10:40 AM CST -----  ASA needs to be held for 7 days prior to surgery.    Vincent    ----- Message -----  From: Tracy Galloway LPN  Sent: 2/26/2020   9:13 AM CST  To: Vincent King MD, Christine BERNAL Staff    Patient is scheduled to have a Turp with you on 3/3/2020.   Per -   Pre-op evaluation for TURO to help with BPH with urinary obstruction - Dr King  -cleared for surgery with benefits>risks.  -OK to hold ASA for whatever time period surgeon requires    What's your recommendations for hold Asa(How many days)    Please advise

## 2020-02-26 NOTE — TELEPHONE ENCOUNTER
----- Message from Tracy Galloway LPN sent at 2/26/2020  9:08 AM CST -----  Patient was seen by you on 2/21/2020 for clearance and Asa guidelines. How many days can the patient hold his Asa?     Please advise

## 2020-02-26 NOTE — TELEPHONE ENCOUNTER
Please advise.    As per Dr Miranda's note, ok to hold ASA for whatever time period surgeon requires,.    Thanks     Barbara.

## 2020-02-26 NOTE — PROGRESS NOTES
"Urology [facility]  Staff: Vincent King MD    CC: BPH with LUTS    HPI:  Taran Hernandes is a 79 y.o. male with BPH with LUTS.      He is here for his pre-operative appointment for a TURP. He has nocturia x 6-8.  + hesitancy.  + intermittency.  + decreased FOS. + frequency.  No dysuria.  No hematuria.  No f/c.  He's on flomax.   No hematuria.  No dysuria. He denies ED.    He has a history of HTN, CKD 3 (creatinine at baseline of 1.4) and prediabetes (A1c 5.9 2/21/2020).     He has been seen by Dr. Miranda for pre-operative medical clearance. Per his not on 2/21/2020:   - "cleared for surgery with benefits>risks"  - "OK to hold ASA for whatever time period surgeon requires"  - "with CKD3 important to maintain good hydration by IV if po not possible"  - Hypertension, diabetes, and hyperlipidemia are controlled      ROS:  Neg except per HPI    Past Medical History:   Diagnosis Date    BPH (benign prostatic hyperplasia)     BPH with urinary obstruction 8/22/2016    DDD (degenerative disc disease), lumbar     Dyslipidemia     Elevated prostate specific antigen (PSA) 8/22/2016    Fatty liver     GERD (gastroesophageal reflux disease)     Hematuria     History of adenomatous polyp of colon 7/17/2015    History of colonic polyps     Hypertension     Lumbar disc disease     Mild vitamin D deficiency     Renal disorder        Past Surgical History:   Procedure Laterality Date    CATARACT EXTRACTION W/  INTRAOCULAR LENS IMPLANT Left     Dr Ruggiero     CATARACT EXTRACTION W/  INTRAOCULAR LENS IMPLANT Right 03/27/16    Dr Ruggiero    CHOLECYSTECTOMY      EYE SURGERY      FRACTURE SURGERY      left ankle    LASIK Bilateral 2016    NEUROMA SURGERY Right 2006       Social History     Socioeconomic History    Marital status:      Spouse name: Not on file    Number of children: Not on file    Years of education: Not on file    Highest education level: Not on file   Occupational History    Occupation: "     Social Needs    Financial resource strain: Not on file    Food insecurity:     Worry: Not on file     Inability: Not on file    Transportation needs:     Medical: Not on file     Non-medical: Not on file   Tobacco Use    Smoking status: Never Smoker    Smokeless tobacco: Never Used   Substance and Sexual Activity    Alcohol use: Yes     Alcohol/week: 2.0 standard drinks     Types: 2 Cans of beer per week    Drug use: No    Sexual activity: Yes     Partners: Female   Lifestyle    Physical activity:     Days per week: Not on file     Minutes per session: Not on file    Stress: Not on file   Relationships    Social connections:     Talks on phone: Not on file     Gets together: Not on file     Attends Advent service: Not on file     Active member of club or organization: Not on file     Attends meetings of clubs or organizations: Not on file     Relationship status: Not on file   Other Topics Concern    Not on file   Social History Narrative    Not on file       Family History   Problem Relation Age of Onset    Cancer Mother         complications of c-scope/ suspect colon cancer    Hypertension Father     Diabetes Father     Heart disease Father     Cancer Brother         liver/ etoh    Hypertension Brother     No Known Problems Daughter     Heart attack Son     Heart disease Son     Kidney disease Son         kidney removed for tumor    No Known Problems Son     No Known Problems Son     No Known Problems Son     Melanoma Neg Hx     Blindness Neg Hx     Glaucoma Neg Hx     Macular degeneration Neg Hx     Retinal detachment Neg Hx     Anesthesia problems Neg Hx        Review of patient's allergies indicates:   Allergen Reactions    No known allergies        Current Outpatient Medications on File Prior to Visit   Medication Sig Dispense Refill    acetaminophen (TYLENOL EXTRA STRENGTH) 500 MG tablet Take 500 mg by mouth every 6 (six) hours as needed for Pain.       "amLODIPine (NORVASC) 10 MG tablet Take 1 tablet (10 mg total) by mouth once daily. 90 tablet 3    aspirin (ECOTRIN) 81 MG EC tablet Take 81 mg by mouth once daily.       atorvastatin (LIPITOR) 10 MG tablet TAKE 1 TABLET(10 MG) BY MOUTH EVERY DAY 90 tablet 1    blood pressure monitor Kit Use as directed. 1 each 0    losartan (COZAAR) 100 MG tablet Take 1 tablet (100 mg total) by mouth once daily. 90 tablet 3    omeprazole (PRILOSEC) 20 MG capsule Take 20 mg by mouth once daily.      tamsulosin (FLOMAX) 0.4 mg Cap Take 1 capsule (0.4 mg total) by mouth once daily. 90 capsule 3    vitamin D (VITAMIN D3) 1000 units Tab Take 1,000 Units by mouth once daily.       No current facility-administered medications on file prior to visit.        Anticoagulation:  Yes - ASA 81    Physical Exam:  Estimated body mass index is 30.2 kg/m² as calculated from the following:    Height as of 2/21/20: 5' 4" (1.626 m).    Weight as of 2/21/20: 79.8 kg (175 lb 14.8 oz).    General: No acute distress, well developed. AAOx3  Head: Normocephalic, Atraumatic  Eyes: Extra-occular movements intact, No discharge  Neck: supple, symmetrical, trachea midline  Lungs: normal respiratory effort, no respiratory distress, no wheezes  CV: regular rate, 2+ pulses  Abdomen: soft, non-tender, non-distended, no organomegaly  : Penis is uncircumcised, there are no lesions, masses, plaques. Scrotum showed no rashes or lesions. Testicles and epididymes were symmetric and showed no masses or tenderness. Urethral meatus is orthotopic, patent and without lesions or discharge.   MSK: no edema, no deformities, normal ROM  Skin: skin color, texture, turgor normal.  Neurologic: no focal deficits, sensation intact    Labs:    Urine dipstick today - negative for all components - Urine sent for culture    Lab Results   Component Value Date    WBC 9.55 02/21/2020    HGB 14.0 02/21/2020    HCT 44.0 02/21/2020    MCV 90 02/21/2020     02/21/2020     BMP  Lab " Results   Component Value Date     02/21/2020    K 4.3 02/21/2020     02/21/2020    CO2 25 02/21/2020    BUN 13 02/21/2020    CREATININE 1.4 02/21/2020    CALCIUM 9.2 02/21/2020    ANIONGAP 10 02/21/2020    ESTGFRAFRICA 54.5 (A) 02/21/2020    EGFRNONAA 47.1 (A) 02/21/2020       Lab Results   Component Value Date    PSA 4.2 (H) 07/20/2016    PSA 4.9 (H) 06/03/2015    PSA 5.4 (H) 05/27/2014       Imaging:  None    Assessment: Taran Hernandes is a 79 y.o. male with BPH and LUTS    Plan:     1. To OR on 3/3/2020 for TURP.  2. Consents signed   3. I have explained the risk, benefits, and alternatives of the procedure in detail. The patient voices understanding and all questions have been answered. The patient agrees to proceed as planned.   4. Urine dipstick today negative for all components - Uclx 2/27/20: No growth   5. Has been seen for pre-operative clearance. OK to proceed with surgery as benefits>risks. Patient instructed to hold ASA 81 for 7 days prior to surgery.    Kwame Gibbs MD

## 2020-02-26 NOTE — H&P (VIEW-ONLY)
"Urology [facility]  Staff: Vincent King MD    CC: BPH with LUTS    HPI:  Taran Hernandes is a 79 y.o. male with BPH with LUTS.      He is here for his pre-operative appointment for a TURP. He has nocturia x 6-8.  + hesitancy.  + intermittency.  + decreased FOS. + frequency.  No dysuria.  No hematuria.  No f/c.  He's on flomax.   No hematuria.  No dysuria. He denies ED.    He has a history of HTN, CKD 3 (creatinine at baseline of 1.4) and prediabetes (A1c 5.9 2/21/2020).     He has been seen by Dr. Miranda for pre-operative medical clearance. Per his not on 2/21/2020:   - "cleared for surgery with benefits>risks"  - "OK to hold ASA for whatever time period surgeon requires"  - "with CKD3 important to maintain good hydration by IV if po not possible"  - Hypertension, diabetes, and hyperlipidemia are controlled      ROS:  Neg except per HPI    Past Medical History:   Diagnosis Date    BPH (benign prostatic hyperplasia)     BPH with urinary obstruction 8/22/2016    DDD (degenerative disc disease), lumbar     Dyslipidemia     Elevated prostate specific antigen (PSA) 8/22/2016    Fatty liver     GERD (gastroesophageal reflux disease)     Hematuria     History of adenomatous polyp of colon 7/17/2015    History of colonic polyps     Hypertension     Lumbar disc disease     Mild vitamin D deficiency     Renal disorder        Past Surgical History:   Procedure Laterality Date    CATARACT EXTRACTION W/  INTRAOCULAR LENS IMPLANT Left     Dr Ruggiero     CATARACT EXTRACTION W/  INTRAOCULAR LENS IMPLANT Right 03/27/16    Dr Ruggiero    CHOLECYSTECTOMY      EYE SURGERY      FRACTURE SURGERY      left ankle    LASIK Bilateral 2016    NEUROMA SURGERY Right 2006       Social History     Socioeconomic History    Marital status:      Spouse name: Not on file    Number of children: Not on file    Years of education: Not on file    Highest education level: Not on file   Occupational History    Occupation: "     Social Needs    Financial resource strain: Not on file    Food insecurity:     Worry: Not on file     Inability: Not on file    Transportation needs:     Medical: Not on file     Non-medical: Not on file   Tobacco Use    Smoking status: Never Smoker    Smokeless tobacco: Never Used   Substance and Sexual Activity    Alcohol use: Yes     Alcohol/week: 2.0 standard drinks     Types: 2 Cans of beer per week    Drug use: No    Sexual activity: Yes     Partners: Female   Lifestyle    Physical activity:     Days per week: Not on file     Minutes per session: Not on file    Stress: Not on file   Relationships    Social connections:     Talks on phone: Not on file     Gets together: Not on file     Attends Evangelical service: Not on file     Active member of club or organization: Not on file     Attends meetings of clubs or organizations: Not on file     Relationship status: Not on file   Other Topics Concern    Not on file   Social History Narrative    Not on file       Family History   Problem Relation Age of Onset    Cancer Mother         complications of c-scope/ suspect colon cancer    Hypertension Father     Diabetes Father     Heart disease Father     Cancer Brother         liver/ etoh    Hypertension Brother     No Known Problems Daughter     Heart attack Son     Heart disease Son     Kidney disease Son         kidney removed for tumor    No Known Problems Son     No Known Problems Son     No Known Problems Son     Melanoma Neg Hx     Blindness Neg Hx     Glaucoma Neg Hx     Macular degeneration Neg Hx     Retinal detachment Neg Hx     Anesthesia problems Neg Hx        Review of patient's allergies indicates:   Allergen Reactions    No known allergies        Current Outpatient Medications on File Prior to Visit   Medication Sig Dispense Refill    acetaminophen (TYLENOL EXTRA STRENGTH) 500 MG tablet Take 500 mg by mouth every 6 (six) hours as needed for Pain.       "amLODIPine (NORVASC) 10 MG tablet Take 1 tablet (10 mg total) by mouth once daily. 90 tablet 3    aspirin (ECOTRIN) 81 MG EC tablet Take 81 mg by mouth once daily.       atorvastatin (LIPITOR) 10 MG tablet TAKE 1 TABLET(10 MG) BY MOUTH EVERY DAY 90 tablet 1    blood pressure monitor Kit Use as directed. 1 each 0    losartan (COZAAR) 100 MG tablet Take 1 tablet (100 mg total) by mouth once daily. 90 tablet 3    omeprazole (PRILOSEC) 20 MG capsule Take 20 mg by mouth once daily.      tamsulosin (FLOMAX) 0.4 mg Cap Take 1 capsule (0.4 mg total) by mouth once daily. 90 capsule 3    vitamin D (VITAMIN D3) 1000 units Tab Take 1,000 Units by mouth once daily.       No current facility-administered medications on file prior to visit.        Anticoagulation:  Yes - ASA 81    Physical Exam:  Estimated body mass index is 30.2 kg/m² as calculated from the following:    Height as of 2/21/20: 5' 4" (1.626 m).    Weight as of 2/21/20: 79.8 kg (175 lb 14.8 oz).    General: No acute distress, well developed. AAOx3  Head: Normocephalic, Atraumatic  Eyes: Extra-occular movements intact, No discharge  Neck: supple, symmetrical, trachea midline  Lungs: normal respiratory effort, no respiratory distress, no wheezes  CV: regular rate, 2+ pulses  Abdomen: soft, non-tender, non-distended, no organomegaly  : Penis is uncircumcised, there are no lesions, masses, plaques. Scrotum showed no rashes or lesions. Testicles and epididymes were symmetric and showed no masses or tenderness. Urethral meatus is orthotopic, patent and without lesions or discharge.   MSK: no edema, no deformities, normal ROM  Skin: skin color, texture, turgor normal.  Neurologic: no focal deficits, sensation intact    Labs:    Urine dipstick today - negative for all components - Urine sent for culture    Lab Results   Component Value Date    WBC 9.55 02/21/2020    HGB 14.0 02/21/2020    HCT 44.0 02/21/2020    MCV 90 02/21/2020     02/21/2020     BMP  Lab " Results   Component Value Date     02/21/2020    K 4.3 02/21/2020     02/21/2020    CO2 25 02/21/2020    BUN 13 02/21/2020    CREATININE 1.4 02/21/2020    CALCIUM 9.2 02/21/2020    ANIONGAP 10 02/21/2020    ESTGFRAFRICA 54.5 (A) 02/21/2020    EGFRNONAA 47.1 (A) 02/21/2020       Lab Results   Component Value Date    PSA 4.2 (H) 07/20/2016    PSA 4.9 (H) 06/03/2015    PSA 5.4 (H) 05/27/2014       Imaging:  None    Assessment: Taran Hernandes is a 79 y.o. male with BPH and LUTS    Plan:     1. To OR on 3/3/2020 for TURP.  2. Consents signed   3. I have explained the risk, benefits, and alternatives of the procedure in detail. The patient voices understanding and all questions have been answered. The patient agrees to proceed as planned.   4. Urine dipstick today negative for all components - Uclx 2/27/20: No growth   5. Has been seen for pre-operative clearance. OK to proceed with surgery as benefits>risks. Patient instructed to hold ASA 81 for 7 days prior to surgery.    Kwame Gibbs MD

## 2020-02-27 LAB — BACTERIA UR CULT: NO GROWTH

## 2020-03-02 ENCOUNTER — TELEPHONE (OUTPATIENT)
Dept: UROLOGY | Facility: CLINIC | Age: 80
End: 2020-03-02

## 2020-03-02 NOTE — TELEPHONE ENCOUNTER
Called pt to confirm arrival time of 730am for procedure on 3-3-20. Gave pt NPO instructions and gave pt opportunity to ask questions. Pt verbalized understanding.

## 2020-03-03 ENCOUNTER — HOSPITAL ENCOUNTER (OUTPATIENT)
Facility: HOSPITAL | Age: 80
LOS: 1 days | Discharge: HOME | End: 2020-03-05
Attending: UROLOGY | Admitting: UROLOGY
Payer: MEDICARE

## 2020-03-03 ENCOUNTER — ANESTHESIA EVENT (OUTPATIENT)
Dept: SURGERY | Facility: HOSPITAL | Age: 80
End: 2020-03-03
Payer: MEDICARE

## 2020-03-03 ENCOUNTER — ANESTHESIA (OUTPATIENT)
Dept: SURGERY | Facility: HOSPITAL | Age: 80
End: 2020-03-03
Payer: MEDICARE

## 2020-03-03 DIAGNOSIS — N40.0 BPH (BENIGN PROSTATIC HYPERPLASIA): Primary | ICD-10-CM

## 2020-03-03 LAB
ABO + RH BLD: NORMAL
ANION GAP SERPL CALC-SCNC: 9 MMOL/L (ref 8–16)
BASOPHILS # BLD AUTO: 0.06 K/UL (ref 0–0.2)
BASOPHILS NFR BLD: 0.6 % (ref 0–1.9)
BLD GP AB SCN CELLS X3 SERPL QL: NORMAL
BUN SERPL-MCNC: 11 MG/DL (ref 8–23)
CALCIUM SERPL-MCNC: 8.1 MG/DL (ref 8.7–10.5)
CHLORIDE SERPL-SCNC: 108 MMOL/L (ref 95–110)
CO2 SERPL-SCNC: 23 MMOL/L (ref 23–29)
CREAT SERPL-MCNC: 1.2 MG/DL (ref 0.5–1.4)
DIFFERENTIAL METHOD: ABNORMAL
EOSINOPHIL # BLD AUTO: 0.2 K/UL (ref 0–0.5)
EOSINOPHIL NFR BLD: 1.7 % (ref 0–8)
ERYTHROCYTE [DISTWIDTH] IN BLOOD BY AUTOMATED COUNT: 13.3 % (ref 11.5–14.5)
EST. GFR  (AFRICAN AMERICAN): >60 ML/MIN/1.73 M^2
EST. GFR  (NON AFRICAN AMERICAN): 56.8 ML/MIN/1.73 M^2
GLUCOSE SERPL-MCNC: 119 MG/DL (ref 70–110)
HCT VFR BLD AUTO: 42.8 % (ref 40–54)
HGB BLD-MCNC: 13.5 G/DL (ref 14–18)
IMM GRANULOCYTES # BLD AUTO: 0.03 K/UL (ref 0–0.04)
IMM GRANULOCYTES NFR BLD AUTO: 0.3 % (ref 0–0.5)
LYMPHOCYTES # BLD AUTO: 2.3 K/UL (ref 1–4.8)
LYMPHOCYTES NFR BLD: 24.2 % (ref 18–48)
MCH RBC QN AUTO: 28.2 PG (ref 27–31)
MCHC RBC AUTO-ENTMCNC: 31.5 G/DL (ref 32–36)
MCV RBC AUTO: 89 FL (ref 82–98)
MONOCYTES # BLD AUTO: 0.4 K/UL (ref 0.3–1)
MONOCYTES NFR BLD: 4.1 % (ref 4–15)
NEUTROPHILS # BLD AUTO: 6.6 K/UL (ref 1.8–7.7)
NEUTROPHILS NFR BLD: 69.1 % (ref 38–73)
NRBC BLD-RTO: 0 /100 WBC
PLATELET # BLD AUTO: 189 K/UL (ref 150–350)
PMV BLD AUTO: 9.7 FL (ref 9.2–12.9)
POTASSIUM SERPL-SCNC: 4.1 MMOL/L (ref 3.5–5.1)
RBC # BLD AUTO: 4.79 M/UL (ref 4.6–6.2)
SODIUM SERPL-SCNC: 140 MMOL/L (ref 136–145)
WBC # BLD AUTO: 9.51 K/UL (ref 3.9–12.7)

## 2020-03-03 PROCEDURE — 86901 BLOOD TYPING SEROLOGIC RH(D): CPT | Mod: HCNC

## 2020-03-03 PROCEDURE — 36000707: Mod: HCNC | Performed by: UROLOGY

## 2020-03-03 PROCEDURE — D9220A PRA ANESTHESIA: ICD-10-PCS | Mod: HCNC,CRNA,, | Performed by: NURSE ANESTHETIST, CERTIFIED REGISTERED

## 2020-03-03 PROCEDURE — 63600175 PHARM REV CODE 636 W HCPCS: Mod: HCNC | Performed by: NURSE ANESTHETIST, CERTIFIED REGISTERED

## 2020-03-03 PROCEDURE — 88305 TISSUE EXAM BY PATHOLOGIST: CPT | Mod: HCNC | Performed by: PATHOLOGY

## 2020-03-03 PROCEDURE — 63600175 PHARM REV CODE 636 W HCPCS: Mod: HCNC | Performed by: STUDENT IN AN ORGANIZED HEALTH CARE EDUCATION/TRAINING PROGRAM

## 2020-03-03 PROCEDURE — 85025 COMPLETE CBC W/AUTO DIFF WBC: CPT | Mod: HCNC

## 2020-03-03 PROCEDURE — D9220A PRA ANESTHESIA: Mod: HCNC,ANES,, | Performed by: ANESTHESIOLOGY

## 2020-03-03 PROCEDURE — 88305 TISSUE EXAM BY PATHOLOGIST: CPT | Mod: 26,HCNC,, | Performed by: PATHOLOGY

## 2020-03-03 PROCEDURE — 37000009 HC ANESTHESIA EA ADD 15 MINS: Mod: HCNC | Performed by: UROLOGY

## 2020-03-03 PROCEDURE — D9220A PRA ANESTHESIA: ICD-10-PCS | Mod: HCNC,ANES,, | Performed by: ANESTHESIOLOGY

## 2020-03-03 PROCEDURE — 80048 BASIC METABOLIC PNL TOTAL CA: CPT | Mod: HCNC

## 2020-03-03 PROCEDURE — 71000044 HC DOSC ROUTINE RECOVERY FIRST HOUR: Mod: HCNC | Performed by: UROLOGY

## 2020-03-03 PROCEDURE — 25000003 PHARM REV CODE 250: Mod: HCNC | Performed by: NURSE ANESTHETIST, CERTIFIED REGISTERED

## 2020-03-03 PROCEDURE — 27201423 OPTIME MED/SURG SUP & DEVICES STERILE SUPPLY: Mod: HCNC | Performed by: UROLOGY

## 2020-03-03 PROCEDURE — 52601 PROSTATECTOMY (TURP): CPT | Mod: HCNC,,, | Performed by: UROLOGY

## 2020-03-03 PROCEDURE — 25000003 PHARM REV CODE 250: Mod: HCNC | Performed by: STUDENT IN AN ORGANIZED HEALTH CARE EDUCATION/TRAINING PROGRAM

## 2020-03-03 PROCEDURE — 36000706: Mod: HCNC | Performed by: UROLOGY

## 2020-03-03 PROCEDURE — 71000015 HC POSTOP RECOV 1ST HR: Mod: HCNC | Performed by: UROLOGY

## 2020-03-03 PROCEDURE — 37000008 HC ANESTHESIA 1ST 15 MINUTES: Mod: HCNC | Performed by: UROLOGY

## 2020-03-03 PROCEDURE — 52601 PR TRANSURETHRAL ELEC-SURG PROSTATECTOM: ICD-10-PCS | Mod: HCNC,,, | Performed by: UROLOGY

## 2020-03-03 PROCEDURE — 88305 TISSUE EXAM BY PATHOLOGIST: ICD-10-PCS | Mod: 26,HCNC,, | Performed by: PATHOLOGY

## 2020-03-03 PROCEDURE — D9220A PRA ANESTHESIA: Mod: HCNC,CRNA,, | Performed by: NURSE ANESTHETIST, CERTIFIED REGISTERED

## 2020-03-03 RX ORDER — EPHEDRINE SULFATE 50 MG/ML
INJECTION, SOLUTION INTRAVENOUS
Status: DISCONTINUED | OUTPATIENT
Start: 2020-03-03 | End: 2020-03-03

## 2020-03-03 RX ORDER — ATORVASTATIN CALCIUM 10 MG/1
10 TABLET, FILM COATED ORAL DAILY
Status: DISCONTINUED | OUTPATIENT
Start: 2020-03-04 | End: 2020-03-05 | Stop reason: HOSPADM

## 2020-03-03 RX ORDER — MIDAZOLAM HYDROCHLORIDE 1 MG/ML
INJECTION, SOLUTION INTRAMUSCULAR; INTRAVENOUS
Status: DISCONTINUED | OUTPATIENT
Start: 2020-03-03 | End: 2020-03-03

## 2020-03-03 RX ORDER — SODIUM CHLORIDE 9 MG/ML
INJECTION, SOLUTION INTRAVENOUS CONTINUOUS
Status: DISCONTINUED | OUTPATIENT
Start: 2020-03-03 | End: 2020-03-04

## 2020-03-03 RX ORDER — PANTOPRAZOLE SODIUM 40 MG/1
40 TABLET, DELAYED RELEASE ORAL DAILY
Status: DISCONTINUED | OUTPATIENT
Start: 2020-03-03 | End: 2020-03-05 | Stop reason: HOSPADM

## 2020-03-03 RX ORDER — OXYCODONE HYDROCHLORIDE 5 MG/1
5 TABLET ORAL EVERY 4 HOURS PRN
Status: DISCONTINUED | OUTPATIENT
Start: 2020-03-03 | End: 2020-03-05 | Stop reason: HOSPADM

## 2020-03-03 RX ORDER — LIDOCAINE HYDROCHLORIDE 10 MG/ML
1 INJECTION, SOLUTION EPIDURAL; INFILTRATION; INTRACAUDAL; PERINEURAL ONCE
Status: DISCONTINUED | OUTPATIENT
Start: 2020-03-03 | End: 2020-03-03

## 2020-03-03 RX ORDER — OXYBUTYNIN CHLORIDE 5 MG/1
5 TABLET ORAL 3 TIMES DAILY PRN
Status: DISCONTINUED | OUTPATIENT
Start: 2020-03-03 | End: 2020-03-04

## 2020-03-03 RX ORDER — POLYETHYLENE GLYCOL 3350 17 G/17G
17 POWDER, FOR SOLUTION ORAL DAILY
Status: DISCONTINUED | OUTPATIENT
Start: 2020-03-03 | End: 2020-03-05 | Stop reason: HOSPADM

## 2020-03-03 RX ORDER — DEXAMETHASONE SODIUM PHOSPHATE 4 MG/ML
INJECTION, SOLUTION INTRA-ARTICULAR; INTRALESIONAL; INTRAMUSCULAR; INTRAVENOUS; SOFT TISSUE
Status: DISCONTINUED | OUTPATIENT
Start: 2020-03-03 | End: 2020-03-03

## 2020-03-03 RX ORDER — TAMSULOSIN HYDROCHLORIDE 0.4 MG/1
0.4 CAPSULE ORAL DAILY
Status: DISCONTINUED | OUTPATIENT
Start: 2020-03-03 | End: 2020-03-05 | Stop reason: HOSPADM

## 2020-03-03 RX ORDER — CEFAZOLIN SODIUM 1 G/3ML
2 INJECTION, POWDER, FOR SOLUTION INTRAMUSCULAR; INTRAVENOUS
Status: COMPLETED | OUTPATIENT
Start: 2020-03-03 | End: 2020-03-03

## 2020-03-03 RX ORDER — ROCURONIUM BROMIDE 10 MG/ML
INJECTION, SOLUTION INTRAVENOUS
Status: DISCONTINUED | OUTPATIENT
Start: 2020-03-03 | End: 2020-03-03

## 2020-03-03 RX ORDER — FENTANYL CITRATE 50 UG/ML
INJECTION, SOLUTION INTRAMUSCULAR; INTRAVENOUS
Status: COMPLETED
Start: 2020-03-03 | End: 2020-03-03

## 2020-03-03 RX ORDER — LIDOCAINE HYDROCHLORIDE 20 MG/ML
INJECTION INTRAVENOUS
Status: DISCONTINUED | OUTPATIENT
Start: 2020-03-03 | End: 2020-03-03

## 2020-03-03 RX ORDER — FENTANYL CITRATE 50 UG/ML
INJECTION, SOLUTION INTRAMUSCULAR; INTRAVENOUS
Status: DISCONTINUED | OUTPATIENT
Start: 2020-03-03 | End: 2020-03-03

## 2020-03-03 RX ORDER — SODIUM CHLORIDE 9 MG/ML
INJECTION, SOLUTION INTRAVENOUS CONTINUOUS
Status: DISCONTINUED | OUTPATIENT
Start: 2020-03-03 | End: 2020-03-03

## 2020-03-03 RX ORDER — ACETAMINOPHEN 500 MG
1000 TABLET ORAL EVERY 6 HOURS
Status: DISCONTINUED | OUTPATIENT
Start: 2020-03-03 | End: 2020-03-05 | Stop reason: HOSPADM

## 2020-03-03 RX ORDER — AMLODIPINE BESYLATE 5 MG/1
10 TABLET ORAL DAILY
Status: DISCONTINUED | OUTPATIENT
Start: 2020-03-04 | End: 2020-03-05 | Stop reason: HOSPADM

## 2020-03-03 RX ORDER — ONDANSETRON 2 MG/ML
INJECTION INTRAMUSCULAR; INTRAVENOUS
Status: DISCONTINUED | OUTPATIENT
Start: 2020-03-03 | End: 2020-03-03

## 2020-03-03 RX ORDER — ONDANSETRON 8 MG/1
8 TABLET, ORALLY DISINTEGRATING ORAL EVERY 6 HOURS PRN
Status: DISCONTINUED | OUTPATIENT
Start: 2020-03-03 | End: 2020-03-05 | Stop reason: HOSPADM

## 2020-03-03 RX ORDER — LOSARTAN POTASSIUM 50 MG/1
100 TABLET ORAL DAILY
Status: DISCONTINUED | OUTPATIENT
Start: 2020-03-04 | End: 2020-03-05 | Stop reason: HOSPADM

## 2020-03-03 RX ORDER — PROPOFOL 10 MG/ML
VIAL (ML) INTRAVENOUS
Status: DISCONTINUED | OUTPATIENT
Start: 2020-03-03 | End: 2020-03-03

## 2020-03-03 RX ORDER — SODIUM CHLORIDE 0.9 % (FLUSH) 0.9 %
10 SYRINGE (ML) INJECTION
Status: DISCONTINUED | OUTPATIENT
Start: 2020-03-03 | End: 2020-03-05 | Stop reason: HOSPADM

## 2020-03-03 RX ADMIN — OXYCODONE HYDROCHLORIDE 5 MG: 5 TABLET ORAL at 02:03

## 2020-03-03 RX ADMIN — PROPOFOL 200 MG: 10 INJECTION, EMULSION INTRAVENOUS at 10:03

## 2020-03-03 RX ADMIN — FENTANYL CITRATE 50 MCG: 50 INJECTION, SOLUTION INTRAMUSCULAR; INTRAVENOUS at 10:03

## 2020-03-03 RX ADMIN — EPHEDRINE SULFATE 5 MG: 50 INJECTION, SOLUTION INTRAMUSCULAR; INTRAVENOUS; SUBCUTANEOUS at 10:03

## 2020-03-03 RX ADMIN — EPHEDRINE SULFATE 5 MG: 50 INJECTION, SOLUTION INTRAMUSCULAR; INTRAVENOUS; SUBCUTANEOUS at 11:03

## 2020-03-03 RX ADMIN — SUGAMMADEX 200 MG: 100 INJECTION, SOLUTION INTRAVENOUS at 11:03

## 2020-03-03 RX ADMIN — SODIUM CHLORIDE, SODIUM GLUCONATE, SODIUM ACETATE, POTASSIUM CHLORIDE, MAGNESIUM CHLORIDE, SODIUM PHOSPHATE, DIBASIC, AND POTASSIUM PHOSPHATE: .53; .5; .37; .037; .03; .012; .00082 INJECTION, SOLUTION INTRAVENOUS at 11:03

## 2020-03-03 RX ADMIN — MIDAZOLAM HYDROCHLORIDE 2 MG: 1 INJECTION, SOLUTION INTRAMUSCULAR; INTRAVENOUS at 10:03

## 2020-03-03 RX ADMIN — LIDOCAINE HYDROCHLORIDE 20 MG: 20 INJECTION, SOLUTION INTRAVENOUS at 11:03

## 2020-03-03 RX ADMIN — CEFAZOLIN 2 G: 330 INJECTION, POWDER, FOR SOLUTION INTRAMUSCULAR; INTRAVENOUS at 10:03

## 2020-03-03 RX ADMIN — ACETAMINOPHEN 1000 MG: 500 TABLET ORAL at 05:03

## 2020-03-03 RX ADMIN — DEXAMETHASONE SODIUM PHOSPHATE 8 MG: 4 INJECTION, SOLUTION INTRA-ARTICULAR; INTRALESIONAL; INTRAMUSCULAR; INTRAVENOUS; SOFT TISSUE at 10:03

## 2020-03-03 RX ADMIN — LIDOCAINE HYDROCHLORIDE 20 MG: 20 INJECTION, SOLUTION INTRAVENOUS at 10:03

## 2020-03-03 RX ADMIN — ROCURONIUM BROMIDE 50 MG: 10 INJECTION, SOLUTION INTRAVENOUS at 10:03

## 2020-03-03 RX ADMIN — FENTANYL CITRATE 50 MCG: 50 INJECTION, SOLUTION INTRAMUSCULAR; INTRAVENOUS at 11:03

## 2020-03-03 RX ADMIN — SODIUM CHLORIDE: 0.9 INJECTION, SOLUTION INTRAVENOUS at 10:03

## 2020-03-03 RX ADMIN — SODIUM CHLORIDE: 0.9 INJECTION, SOLUTION INTRAVENOUS at 02:03

## 2020-03-03 RX ADMIN — ONDANSETRON 8 MG: 2 INJECTION INTRAMUSCULAR; INTRAVENOUS at 10:03

## 2020-03-03 NOTE — ANESTHESIA PREPROCEDURE EVALUATION
03/03/2020  Taran Hernandes is a 79 y.o., male.  .ca1  Pre-operative evaluation for Procedure(s) (LRB):  TURP (TRANSURETHRAL RESECTION OF PROSTATE) (N/A)    Taran Hernandes is a 79 y.o. male with persistent prostatic hypertrophy symptoms. Otherwise, no acute problems. 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      Current Outpatient Medications on File Prior to Encounter   Medication Sig Dispense Refill    acetaminophen (TYLENOL EXTRA STRENGTH) 500 MG tablet Take 500 mg by mouth every 6 (six) hours as needed for Pain.      amLODIPine (NORVASC) 10 MG tablet Take 1 tablet (10 mg total) by mouth once daily. 90 tablet 3    aspirin (ECOTRIN) 81 MG EC tablet Take 81 mg by mouth once daily.       atorvastatin (LIPITOR) 10 MG tablet TAKE 1 TABLET(10 MG) BY MOUTH EVERY DAY 90 tablet 1    blood pressure monitor Kit Use as directed. 1 each 0    losartan (COZAAR) 100 MG tablet Take 1 tablet (100 mg total) by mouth once daily. 90 tablet 3    omeprazole (PRILOSEC) 20 MG capsule Take 20 mg by mouth once daily.      tamsulosin  (FLOMAX) 0.4 mg Cap Take 1 capsule (0.4 mg total) by mouth once daily. 90 capsule 3       Past Surgical History:   Procedure Laterality Date    CATARACT EXTRACTION W/  INTRAOCULAR LENS IMPLANT Left     Dr Ruggiero     CATARACT EXTRACTION W/  INTRAOCULAR LENS IMPLANT Right 03/27/16    Dr Ruggiero    CHOLECYSTECTOMY      EYE SURGERY      FRACTURE SURGERY      left ankle    LASIK Bilateral 2016    NEUROMA SURGERY Right 2006                 Diagnostic Studies:          Anesthesia Evaluation    I have reviewed the Patient Summary Reports.    I have reviewed the Nursing Notes.   I have reviewed the Medications.     Review of Systems  Anesthesia Hx:  No problems with previous Anesthesia    Social:  Non-Smoker    Hematology/Oncology:  Hematology Normal   Oncology Normal     EENT/Dental:EENT/Dental Normal   Pulmonary:  Pulmonary Normal    Endocrine:  Endocrine Normal    Dermatological:  Skin Normal    Psych:  Psychiatric Normal           Physical Exam  General:  Obesity, Well nourished    Airway/Jaw/Neck:  Airway Findings: Mouth Opening: Normal Tongue: Normal  General Airway Assessment: Adult  Mallampati: III  Improves to II with phonation.  TM Distance: Normal, at least 6 cm      Dental:  Dental Findings: In tact   Chest/Lungs:  Chest/Lungs Findings: Clear to auscultation     Heart/Vascular:  Heart Findings: Rate: Normal  Rhythm: Regular Rhythm  Sounds: Normal        Mental Status:  Mental Status Findings:  Cooperative, Alert and Oriented         Anesthesia Plan  Type of Anesthesia, risks & benefits discussed:  Anesthesia Type:  general  Patient's Preference:   Intra-op Monitoring Plan: standard ASA monitors  Intra-op Monitoring Plan Comments:   Post Op Pain Control Plan:   Post Op Pain Control Plan Comments:   Induction:   IV  Beta Blocker:         Informed Consent: Patient understands risks and agrees with Anesthesia plan.  Questions answered. Anesthesia consent signed with patient.  ASA Score: 3     Day of Surgery Review  of History & Physical:            Ready For Surgery From Anesthesia Perspective.

## 2020-03-03 NOTE — TRANSFER OF CARE
"Anesthesia Transfer of Care Note    Patient: Taran Hernandes    Procedure(s) Performed: Procedure(s) (LRB):  TURP (TRANSURETHRAL RESECTION OF PROSTATE) (N/A)    Patient location: PACU    Anesthesia Type: general    Transport from OR: Transported from OR on 2-3 L/min O2 by NC with adequate spontaneous ventilation    Post pain: adequate analgesia    Post assessment: no apparent anesthetic complications    Post vital signs: stable    Level of consciousness: awake, alert and oriented    Nausea/Vomiting: no nausea/vomiting    Complications: none    Transfer of care protocol was followed      Last vitals:   Visit Vitals  BP (!) 157/88 (BP Location: Left arm, Patient Position: Lying)   Pulse 86   Temp 36.8 °C (98.2 °F) (Oral)   Resp 16   Ht 5' 4" (1.626 m)   Wt 78.9 kg (174 lb)   SpO2 98%   BMI 29.87 kg/m²     "

## 2020-03-03 NOTE — PLAN OF CARE
Patient resting in bed comfortably. Turp in place, urine clear yellow. iv intact and infusing free of infection and irration, pt encouraged to ambulate halls, edu pt how to use I.s, scds on, fall precautions maintained no falls noted. Call light in reach bed locked and in lowest position. Non skid socks on while out of bed. Patient instructed to call for assistance. Skin integrity maintained as patient is independent with frequent positioning, C/o pain managed with PRN meds, Will continue to monitor and follow careplan of care.

## 2020-03-03 NOTE — OP NOTE
[facility]  Operative Note    Date: 03/03/2020    Pre-Op Diagnosis:   1. BPH with bladder outlet obstruction  2. Urinary frequency  3. 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 male with LUTS refractory to medical therapy. He underwent SUDS which showed bladder outlet obstruction. After discussion of all options, he has elected for TURP.    Findings:   - bilobar hypertrophy present  - sphincter, veru, and bilateral ureteral orifices unharmed in procedure   - open channel seen at conclusion of procedure     Estimated Blood Loss: min    Drains: 24 Fr payne catheter    Procedure in detail:  After the risks and benefits of the procedure were explained, consent was obtained, and the patient was taken to the operating suite and placed in the supine position.  Anesthesia was administered.  When adequately sedated, the patient was placed in dorsal lithotomy position and prepped and draped in normal sterile fashion.  Timeout was performed and preoperative ABx were confirmed.      A 28-Australian sheath resectoscope was placed into the bladder using a visual obturator. The visual obturator was exchanged for the  resecting mechanism. The ureteral orifices were identified. We then turned our attention to the right lateral lobe of the prostate.The right lateral lobe was then resected in a stepwise fashion from 7 o'clock to 12 o'clock with care to leave the verumontanum and sphincter intact. Once this was performed we directed our attention to the left lobe of the prostate and again the lateral lobe was resected from the 5 o'clock to the 12 o'clock position. The floor of the prostate was then resected with a finger within the rectum. Hemostasis was then achieved.    The ureteral orifices, verumontanum and sphincter were intact. The [employer] evacuator was used to remove all prostate chips and again hemostasis was obtained.     Once the bladder was drained, we could see that he had an open channel and the scope was removed.  A 24 Fr 3-way catheter was placed in the bladder with 50 mL of water in the balloon. The patient was placed on traction and on CBI. The patient was transferred to recovery in stable condition.     Disposition: The patient will remain on the urology service overnight for observation and CBI will be titrated.    Key Lujan MD

## 2020-03-03 NOTE — ANESTHESIA POSTPROCEDURE EVALUATION
Anesthesia Post Evaluation    Patient: Taran Hernandes    Procedure(s) Performed: Procedure(s) (LRB):  TURP (TRANSURETHRAL RESECTION OF PROSTATE) (N/A)    Final Anesthesia Type: general    Patient location during evaluation: PACU  Patient participation: Yes- Able to Participate  Level of consciousness: awake and alert  Post-procedure vital signs: reviewed and stable  Pain management: adequate  Airway patency: patent    PONV status at discharge: No PONV  Anesthetic complications: no      Cardiovascular status: blood pressure returned to baseline  Respiratory status: unassisted  Hydration status: euvolemic  Follow-up not needed.          Vitals Value Taken Time   /76 3/3/2020  1:16 PM   Temp 36.8 °C (98.2 °F) 3/3/2020  7:50 AM   Pulse 76 3/3/2020  1:15 PM   Resp 21 3/3/2020  1:15 PM   SpO2 92 % 3/3/2020  1:15 PM   Vitals shown include unvalidated device data.      No case tracking events are documented in the log.      Pain/Jese Score: Jese Score: 10 (3/3/2020 12:33 PM)

## 2020-03-03 NOTE — PROGRESS NOTES
1246: Paged resident MD Le to come speak with family. Will continue to monitor.    1306: MD Christine at bedside to speak with family

## 2020-03-03 NOTE — INTERVAL H&P NOTE
The patient has been examined and the H&P has been reviewed:    I concur with the findings and no changes have occurred since H&P was written.    Anesthesia/Surgery risks, benefits and alternative options discussed and understood by patient/family.    Holding aspirin for 7 days.     Active Hospital Problems    Diagnosis  POA    BPH (benign prostatic hyperplasia) [N40.0]  Yes      Resolved Hospital Problems   No resolved problems to display.

## 2020-03-04 LAB
ANION GAP SERPL CALC-SCNC: 9 MMOL/L (ref 8–16)
BASOPHILS # BLD AUTO: 0.01 K/UL (ref 0–0.2)
BASOPHILS NFR BLD: 0.1 % (ref 0–1.9)
BUN SERPL-MCNC: 14 MG/DL (ref 8–23)
CALCIUM SERPL-MCNC: 8.1 MG/DL (ref 8.7–10.5)
CHLORIDE SERPL-SCNC: 109 MMOL/L (ref 95–110)
CO2 SERPL-SCNC: 20 MMOL/L (ref 23–29)
CREAT SERPL-MCNC: 1.1 MG/DL (ref 0.5–1.4)
DIFFERENTIAL METHOD: ABNORMAL
EOSINOPHIL # BLD AUTO: 0 K/UL (ref 0–0.5)
EOSINOPHIL NFR BLD: 0 % (ref 0–8)
ERYTHROCYTE [DISTWIDTH] IN BLOOD BY AUTOMATED COUNT: 13.2 % (ref 11.5–14.5)
EST. GFR  (AFRICAN AMERICAN): >60 ML/MIN/1.73 M^2
EST. GFR  (NON AFRICAN AMERICAN): >60 ML/MIN/1.73 M^2
GLUCOSE SERPL-MCNC: 124 MG/DL (ref 70–110)
HCT VFR BLD AUTO: 40 % (ref 40–54)
HGB BLD-MCNC: 12.8 G/DL (ref 14–18)
IMM GRANULOCYTES # BLD AUTO: 0.08 K/UL (ref 0–0.04)
IMM GRANULOCYTES NFR BLD AUTO: 0.5 % (ref 0–0.5)
LYMPHOCYTES # BLD AUTO: 1.4 K/UL (ref 1–4.8)
LYMPHOCYTES NFR BLD: 9.2 % (ref 18–48)
MCH RBC QN AUTO: 28.6 PG (ref 27–31)
MCHC RBC AUTO-ENTMCNC: 32 G/DL (ref 32–36)
MCV RBC AUTO: 90 FL (ref 82–98)
MONOCYTES # BLD AUTO: 0.7 K/UL (ref 0.3–1)
MONOCYTES NFR BLD: 4.6 % (ref 4–15)
NEUTROPHILS # BLD AUTO: 13.2 K/UL (ref 1.8–7.7)
NEUTROPHILS NFR BLD: 85.6 % (ref 38–73)
NRBC BLD-RTO: 0 /100 WBC
PLATELET # BLD AUTO: 201 K/UL (ref 150–350)
PMV BLD AUTO: 10 FL (ref 9.2–12.9)
POTASSIUM SERPL-SCNC: 4.2 MMOL/L (ref 3.5–5.1)
RBC # BLD AUTO: 4.47 M/UL (ref 4.6–6.2)
SODIUM SERPL-SCNC: 138 MMOL/L (ref 136–145)
WBC # BLD AUTO: 15.4 K/UL (ref 3.9–12.7)

## 2020-03-04 PROCEDURE — 36415 COLL VENOUS BLD VENIPUNCTURE: CPT | Mod: HCNC

## 2020-03-04 PROCEDURE — 80048 BASIC METABOLIC PNL TOTAL CA: CPT | Mod: HCNC

## 2020-03-04 PROCEDURE — 63600175 PHARM REV CODE 636 W HCPCS: Mod: HCNC | Performed by: STUDENT IN AN ORGANIZED HEALTH CARE EDUCATION/TRAINING PROGRAM

## 2020-03-04 PROCEDURE — 25000003 PHARM REV CODE 250: Mod: HCNC | Performed by: STUDENT IN AN ORGANIZED HEALTH CARE EDUCATION/TRAINING PROGRAM

## 2020-03-04 PROCEDURE — 85025 COMPLETE CBC W/AUTO DIFF WBC: CPT | Mod: HCNC

## 2020-03-04 RX ORDER — OXYCODONE HYDROCHLORIDE 5 MG/1
5 TABLET ORAL EVERY 4 HOURS PRN
Qty: 7 TABLET | Refills: 0 | Status: SHIPPED | OUTPATIENT
Start: 2020-03-04 | End: 2020-06-08

## 2020-03-04 RX ORDER — ATROPA BELLADONNA AND OPIUM 16.2; 3 MG/1; MG/1
30 SUPPOSITORY RECTAL EVERY 8 HOURS PRN
Status: DISCONTINUED | OUTPATIENT
Start: 2020-03-04 | End: 2020-03-05 | Stop reason: HOSPADM

## 2020-03-04 RX ORDER — ACETAMINOPHEN 500 MG
500 TABLET ORAL EVERY 6 HOURS
Qty: 20 TABLET | Refills: 0 | Status: SHIPPED | OUTPATIENT
Start: 2020-03-04 | End: 2020-03-10

## 2020-03-04 RX ORDER — POLYETHYLENE GLYCOL 3350 17 G/17G
17 POWDER, FOR SOLUTION ORAL DAILY
Qty: 510 G | Refills: 0 | Status: SHIPPED | OUTPATIENT
Start: 2020-03-04 | End: 2020-06-08

## 2020-03-04 RX ORDER — HYOSCYAMINE SULFATE 0.12 MG/1
0.12 TABLET SUBLINGUAL EVERY 4 HOURS PRN
Qty: 20 TABLET | Refills: 0 | Status: SHIPPED | OUTPATIENT
Start: 2020-03-04 | End: 2020-09-22

## 2020-03-04 RX ORDER — OXYBUTYNIN CHLORIDE 10 MG/1
10 TABLET, EXTENDED RELEASE ORAL DAILY
Status: DISCONTINUED | OUTPATIENT
Start: 2020-03-05 | End: 2020-03-05 | Stop reason: HOSPADM

## 2020-03-04 RX ADMIN — ACETAMINOPHEN 1000 MG: 500 TABLET ORAL at 05:03

## 2020-03-04 RX ADMIN — LOSARTAN POTASSIUM 100 MG: 50 TABLET ORAL at 06:03

## 2020-03-04 RX ADMIN — POLYETHYLENE GLYCOL 3350 17 G: 17 POWDER, FOR SOLUTION ORAL at 09:03

## 2020-03-04 RX ADMIN — PANTOPRAZOLE SODIUM 40 MG: 40 TABLET, DELAYED RELEASE ORAL at 09:03

## 2020-03-04 RX ADMIN — AMLODIPINE BESYLATE 10 MG: 5 TABLET ORAL at 06:03

## 2020-03-04 RX ADMIN — ACETAMINOPHEN 1000 MG: 500 TABLET ORAL at 12:03

## 2020-03-04 RX ADMIN — SODIUM CHLORIDE: 0.9 INJECTION, SOLUTION INTRAVENOUS at 12:03

## 2020-03-04 RX ADMIN — ATORVASTATIN CALCIUM 10 MG: 10 TABLET, FILM COATED ORAL at 06:03

## 2020-03-04 RX ADMIN — TAMSULOSIN HYDROCHLORIDE 0.4 MG: 0.4 CAPSULE ORAL at 09:03

## 2020-03-04 NOTE — ASSESSMENT & PLAN NOTE
Taran Hernandes is a 79 y.o. male s/p TURP on 3/3/20    - Regular diet  - SLIV  - Dennis removed on rounds  - String of bottles, voiding trial  - Ambulate/oob  - PO pain control  - Restarted home meds    DC home today after successful voiding trial

## 2020-03-04 NOTE — NURSING
Patient sitting on side of the bed with urinal in hand trying go urinated, pt has been educated on the benefits of letting the body naturally take its course and to not strain or force himself to urinate.

## 2020-03-04 NOTE — DISCHARGE INSTRUCTIONS
Post Cystoscopy and Transurethral resection of Prostate Instructions  Do not strain to have a bowel movement  No strenuous exercise x 7 days  No driving while you are on narcotic pain medications or if your payne  catheter is in place    You can expect:  To see blood in your urine.    Go to the ER if:  Your catheter stops draining  You are having severe abdominal pain  Inability to void if you do not have a catheter    Call the doctor if:   Temperature is greater than 101F   Persistent vomiting and inability to keep food down

## 2020-03-04 NOTE — PROGRESS NOTES
CBI clamped. Patient ambulated in halls with no problems/concers. Will ambulated patient again before Urology rounds @ 05:45.

## 2020-03-04 NOTE — PROGRESS NOTES
[facility]  Urology  Progress Note    Patient Name: Taran Hernandes  MRN: [id number]  Admission Date: 3/3/2020  Hospital Length of Stay: 1 days  Code Status: Full Code   Attending Provider: Vincent King MD   Primary Care Physician: Rosita Rios MD    Subjective:     HPI:  Taran Hernandes is a 79 y.o. male s/p TURP on 3/3/20    Interval History:   No acute events events overnight  Pain is well controlled  Ambulated x4  Tolerating PO intake    Review of Systems  Objective:     Temp:  [96.1 °F (35.6 °C)-98.2 °F (36.8 °C)] 96.1 °F (35.6 °C)  Pulse:  [73-86] 73  Resp:  [16-19] 18  SpO2:  [92 %-99 %] 93 %  BP: (107-157)/(56-88) 123/62     Body mass index is 29.87 kg/m².           Drains     Drain                 Urethral Catheter 03/03/20 Latex;Triple-lumen 24 Fr. 1 day                Physical Exam   Constitutional: He is oriented to person, place, and time. He appears well-developed and well-nourished. No distress.   HENT:   Head: Normocephalic and atraumatic.   Eyes: No scleral icterus.   Neck: No JVD present.   Cardiovascular: Normal rate and regular rhythm.    Pulmonary/Chest: Effort normal. No respiratory distress.   Abdominal: Soft. He exhibits no distension. There is no tenderness.   Genitourinary:   Genitourinary Comments: Dennis draining clear light red off CBI   Neurological: He is alert and oriented to person, place, and time.   Skin: He is not diaphoretic.     Psychiatric: He has a normal mood and affect. His behavior is normal.       Significant Labs:    BMP:  Recent Labs   Lab 03/03/20  1211 03/04/20  0505    138   K 4.1 4.2    109   CO2 23 20*   BUN 11 14   CREATININE 1.2 1.1   CALCIUM 8.1* 8.1*       CBC:   Recent Labs   Lab 03/03/20  1211 03/04/20  0505   WBC 9.51 15.40*   HGB 13.5* 12.8*   HCT 42.8 40.0    201       All pertinent labs results from the past 24 hours have been reviewed.    Significant Imaging:  All pertinent imaging results/findings from  the past 24 hours have been reviewed.      Assessment/Plan:     BPH (benign prostatic hyperplasia)  Taran Hernandes is a 79 y.o. male s/p TURP on 3/3/20    - Regular diet  - SLIV  - Dennis removed on rounds  - String of bottles, voiding trial  - Ambulate/oob  - PO pain control  - Restarted home meds    DC home today after successful voiding trial        VTE Risk Mitigation (From admission, onward)         Ordered     Place SHAKIR hose  Until discontinued      03/03/20 1155     Place sequential compression device  Until discontinued      03/03/20 1155     IP VTE HIGH RISK PATIENT  Once      03/03/20 1155                Key Lujan MD  Urology  [facility]

## 2020-03-04 NOTE — PLAN OF CARE
03/04/20 1444   Discharge Assessment   Assessment Type Discharge Planning Assessment     Unable to complete Discharge Assessment due to patient in surgery. Will attempt again tomorrow.

## 2020-03-04 NOTE — SUBJECTIVE & OBJECTIVE
Interval History:   No acute events events overnight  Pain is well controlled  Ambulated x4  Tolerating PO intake    Review of Systems  Objective:     Temp:  [96.1 °F (35.6 °C)-98.2 °F (36.8 °C)] 96.1 °F (35.6 °C)  Pulse:  [73-86] 73  Resp:  [16-19] 18  SpO2:  [92 %-99 %] 93 %  BP: (107-157)/(56-88) 123/62     Body mass index is 29.87 kg/m².           Drains     Drain                 Urethral Catheter 03/03/20 Latex;Triple-lumen 24 Fr. 1 day                Physical Exam   Constitutional: He is oriented to person, place, and time. He appears well-developed and well-nourished. No distress.   HENT:   Head: Normocephalic and atraumatic.   Eyes: No scleral icterus.   Neck: No JVD present.   Cardiovascular: Normal rate and regular rhythm.    Pulmonary/Chest: Effort normal. No respiratory distress.   Abdominal: Soft. He exhibits no distension. There is no tenderness.   Genitourinary:   Genitourinary Comments: Dennis draining clear light red off CBI   Neurological: He is alert and oriented to person, place, and time.   Skin: He is not diaphoretic.     Psychiatric: He has a normal mood and affect. His behavior is normal.       Significant Labs:    BMP:  Recent Labs   Lab 03/03/20  1211 03/04/20  0505    138   K 4.1 4.2    109   CO2 23 20*   BUN 11 14   CREATININE 1.2 1.1   CALCIUM 8.1* 8.1*       CBC:   Recent Labs   Lab 03/03/20  1211 03/04/20  0505   WBC 9.51 15.40*   HGB 13.5* 12.8*   HCT 42.8 40.0    201       All pertinent labs results from the past 24 hours have been reviewed.    Significant Imaging:  All pertinent imaging results/findings from the past 24 hours have been reviewed.

## 2020-03-05 VITALS
HEIGHT: 64 IN | SYSTOLIC BLOOD PRESSURE: 127 MMHG | BODY MASS INDEX: 29.71 KG/M2 | HEART RATE: 65 BPM | OXYGEN SATURATION: 96 % | TEMPERATURE: 98 F | WEIGHT: 174 LBS | RESPIRATION RATE: 18 BRPM | DIASTOLIC BLOOD PRESSURE: 74 MMHG

## 2020-03-05 LAB
FINAL PATHOLOGIC DIAGNOSIS: NORMAL
GROSS: NORMAL

## 2020-03-05 PROCEDURE — 25000003 PHARM REV CODE 250: Mod: HCNC | Performed by: STUDENT IN AN ORGANIZED HEALTH CARE EDUCATION/TRAINING PROGRAM

## 2020-03-05 RX ADMIN — AMLODIPINE BESYLATE 10 MG: 5 TABLET ORAL at 09:03

## 2020-03-05 RX ADMIN — OXYBUTYNIN CHLORIDE 10 MG: 10 TABLET, EXTENDED RELEASE ORAL at 09:03

## 2020-03-05 RX ADMIN — PANTOPRAZOLE SODIUM 40 MG: 40 TABLET, DELAYED RELEASE ORAL at 09:03

## 2020-03-05 RX ADMIN — ACETAMINOPHEN 1000 MG: 500 TABLET ORAL at 12:03

## 2020-03-05 RX ADMIN — LOSARTAN POTASSIUM 100 MG: 50 TABLET ORAL at 09:03

## 2020-03-05 RX ADMIN — POLYETHYLENE GLYCOL 3350 17 G: 17 POWDER, FOR SOLUTION ORAL at 09:03

## 2020-03-05 RX ADMIN — TAMSULOSIN HYDROCHLORIDE 0.4 MG: 0.4 CAPSULE ORAL at 09:03

## 2020-03-05 RX ADMIN — ACETAMINOPHEN 1000 MG: 500 TABLET ORAL at 05:03

## 2020-03-05 RX ADMIN — ATORVASTATIN CALCIUM 10 MG: 10 TABLET, FILM COATED ORAL at 09:03

## 2020-03-05 NOTE — DISCHARGE SUMMARY
[facility]  Urology  Discharge Summary      Patient Name: Taran Hernandes  MRN: [id number]  Admission Date: 3/3/2020  Hospital Length of Stay: 1 days  Discharge Date and Time:  03/05/2020 7:28 AM  Attending Physician: Vincent King MD   Discharging Provider: Issac Muhammad MD  Primary Care Physician: Rosita Rios MD    HPI:     Taran Hernandes is a 79 y.o. male with BPH with LUTS.       He has nocturia x 6-8.  + hesitancy.  + intermittency.  + decreased FOS. + frequency.  No dysuria.  No hematuria.  No f/c.  He's on flomax.   No hematuria.  No dysuria. He denies ED.     He has a history of HTN, CKD 3 (creatinine at baseline of 1.4) and prediabetes (A1c 5.9 2/21/2020).       Procedure(s) (LRB):  TURP (TRANSURETHRAL RESECTION OF PROSTATE) (N/A)     Indwelling Lines/Drains at time of discharge:   Lines/Drains/Airways     Drain                 Urethral Catheter 03/03/20 Latex;Triple-lumen 24 Fr. 2 days                Hospital Course (synopsis of major diagnoses, care, treatment, and services provided during the course of the hospital stay):     On 3/3 the patient underwent the above procedure. He was kept overnight on CBI. On POD1 his CBI was clamped and urine remained clear. Dennis was removed. However, patient was unable to pass voiding trial. Dennis was placed and irrigated multiple times, with removal of clot. On POD2, his Dennis remained adequately clear and he was discharged. He will follow up on Monday (3/9) with an CARYL for a voiding trial.    Consults:     Significant Diagnostic Studies:     Pending Diagnostic Studies:     Procedure Component Value Units Date/Time    Specimen to Pathology, Surgery Urology [[id number]] Collected:  03/03/20 1143    Order Status:  Sent Lab Status:  In process Updated:  03/03/20 1332          Final Active Diagnoses:    Diagnosis Date Noted POA    PRINCIPAL PROBLEM:  BPH (benign prostatic hyperplasia) [N40.0] 03/03/2020 Yes    Hypertensive retinopathy of  both eyes [H35.033] 03/28/2019 Yes    Conjunctival chalasis, bilateral [H11.823] 03/28/2019 Yes    Pseudophakia of both eyes [Z96.1] 03/28/2019 Not Applicable    Atherosclerosis of abdominal aorta [I70.0] 02/27/2018 Yes    Pre-diabetes [R73.03] 02/27/2018 Yes    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, recurrent [G57.61] 09/14/2017 Yes    BMI 30.0-30.9,adult [Z68.30] 05/02/2017 Not Applicable    Choledochal cyst [Q44.4] 11/02/2016 Not Applicable    Hepatic cyst [K76.89] 08/29/2016 Yes    Diverticulosis of colon [K57.30] 06/03/2015 Yes    CKD (chronic kidney disease), stage III [N18.3] 12/10/2012 Yes    Hypertension [I10] 12/10/2012 Yes    Hyperlipidemia [E78.5] 12/10/2012 Yes    GERD (gastroesophageal reflux disease) [K21.9] 12/10/2012 Yes    Fatty liver [K76.0] 12/10/2012 Yes    DDD (degenerative disc disease), lumbar [M51.36] 12/10/2012 Yes      Problems Resolved During this Admission:       Discharged Condition: good    Disposition: Home or Self Care    Follow Up:  Follow-up Information     Vincent King MD On 6/8/2020.    Specialty:  Urology  Why:  post op TURP  Contact information:  [facility] [id number]  [phone]             [facility] - Urology 4th Floor On 3/9/2020.    Specialty:  Urology  Why:  Voiding trial  Contact information:  [facility] [id number] [phone]  Additional information:  [facility] - 4th Floor               Patient Instructions:      Diet general     Notify your health care provider if you experience any of the following:  temperature >100.4     Notify your health care provider if you experience any of the following:  persistent nausea and vomiting or diarrhea     Notify your health care provider if you experience any of the following:  severe uncontrolled pain     Notify your health care provider if you experience any of the following:  persistent dizziness, light-headedness, or visual disturbances     No  dressing needed     Call MD for:  extreme fatigue     Call MD for:  persistent dizziness or light-headedness     Call MD for:  hives     Call MD for:  redness, tenderness, or signs of infection (pain, swelling, redness, odor or green/yellow discharge around incision site)     Call MD for:  difficulty breathing, headache or visual disturbances     Call MD for:  severe uncontrolled pain     Call MD for:  persistent nausea and vomiting     Call MD for:  temperature >100.4     Activity as tolerated     Medications:  Reconciled Home Medications:      Medication List      START taking these medications    hyoscyamine 0.125 mg Subl  Commonly known as:  LEVSIN/SL  Place 1 tablet (0.125 mg total) under the tongue every 4 (four) hours as needed (bladder pain).     oxyCODONE 5 MG immediate release tablet  Commonly known as:  ROXICODONE  Take 1 tablet (5 mg total) by mouth every 4 (four) hours as needed for Pain.     polyethylene glycol 17 gram/dose powder  Commonly known as:  GLYCOLAX  Mix 1 capful (17 g) with liquid and take by mouth once daily.        CHANGE how you take these medications    * Tylenol Extra Strength 500 MG tablet  Generic drug:  acetaminophen  Take 500 mg by mouth every 6 (six) hours as needed for Pain.  What changed:  Another medication with the same name was added. Make sure you understand how and when to take each.     * acetaminophen 500 MG tablet  Commonly known as:  TYLENOL  Take 1 tablet (500 mg total) by mouth every 6 (six) hours. for 5 days  What changed:  You were already taking a medication with the same name, and this prescription was added. Make sure you understand how and when to take each.         * This list has 2 medication(s) that are the same as other medications prescribed for you. Read the directions carefully, and ask your doctor or other care provider to review them with you.            CONTINUE taking these medications    amLODIPine 10 MG tablet  Commonly known as:  NORVASC  Take 1  tablet (10 mg total) by mouth once daily.     aspirin 81 MG EC tablet  Commonly known as:  ECOTRIN  Take 81 mg by mouth once daily.     atorvastatin 10 MG tablet  Commonly known as:  LIPITOR  TAKE 1 TABLET(10 MG) BY MOUTH EVERY DAY     blood pressure monitor Kit  Use as directed.     losartan 100 MG tablet  Commonly known as:  COZAAR  Take 1 tablet (100 mg total) by mouth once daily.     omeprazole 20 MG capsule  Commonly known as:  PRILOSEC  Take 20 mg by mouth once daily.     tamsulosin 0.4 mg Cap  Commonly known as:  Flomax  Take 1 capsule (0.4 mg total) by mouth once daily.     vitamin D 1000 units Tab  Commonly known as:  VITAMIN D3  Take 1,000 Units by mouth once daily.            Time spent on the discharge of patient: 20 minutes    Issac Muhammad MD  Urology  [facility]

## 2020-03-09 ENCOUNTER — OFFICE VISIT (OUTPATIENT)
Dept: UROLOGY | Facility: CLINIC | Age: 80
End: 2020-03-09
Payer: MEDICARE

## 2020-03-09 VITALS
HEART RATE: 79 BPM | WEIGHT: 174.19 LBS | DIASTOLIC BLOOD PRESSURE: 76 MMHG | SYSTOLIC BLOOD PRESSURE: 137 MMHG | BODY MASS INDEX: 29.9 KG/M2

## 2020-03-09 DIAGNOSIS — N13.8 BPH WITH URINARY OBSTRUCTION: ICD-10-CM

## 2020-03-09 DIAGNOSIS — Z90.79 S/P TURP: ICD-10-CM

## 2020-03-09 DIAGNOSIS — N40.1 BPH WITH URINARY OBSTRUCTION: ICD-10-CM

## 2020-03-09 DIAGNOSIS — Z98.890 POST-OPERATIVE STATE: Primary | ICD-10-CM

## 2020-03-09 PROCEDURE — 99999 PR PBB SHADOW E&M-EST. PATIENT-LVL III: CPT | Mod: PBBFAC,HCNC,, | Performed by: NURSE PRACTITIONER

## 2020-03-09 PROCEDURE — 99024 PR POST-OP FOLLOW-UP VISIT: ICD-10-PCS | Mod: HCNC,S$GLB,, | Performed by: NURSE PRACTITIONER

## 2020-03-09 PROCEDURE — 99024 POSTOP FOLLOW-UP VISIT: CPT | Mod: HCNC,S$GLB,, | Performed by: NURSE PRACTITIONER

## 2020-03-09 PROCEDURE — 99999 PR PBB SHADOW E&M-EST. PATIENT-LVL III: ICD-10-PCS | Mod: PBBFAC,HCNC,, | Performed by: NURSE PRACTITIONER

## 2020-03-09 NOTE — PROGRESS NOTES
CHIEF COMPLAINT:    Mr. Hernandes is a 79 y.o. male presenting for VT s/p TURP.    PRESENTING ILLNESS:    Taran Hernandes is a 79 y.o. male new patient to me (records of past medical, family and social history personally reviewed by me), w/ h/o HTN, HLD, lumber DDD, CKD stage III, BPH w/ MARI s/p TURP on 3/3/20 w/ Dr. King.    Findings:   - bilobar hypertrophy present  - sphincter, veru, and bilateral ureteral orifices unharmed in procedure   - open channel seen at conclusion of procedure .     Drains: 24 Fr payne catheter.    Today pt presents to clinic for VT s/p TURP. Reports feeling well since procedure. Payne catheter draining appropriately w/ intermittent GH w/o clots.    REVIEW OF SYSTEMS:    Taran Hernandes denies headache, blurred vision, fever, nausea, vomiting, chills, abdominal pain, pelvic pain, flank pain, bleeding per rectum, cough, SOB, recent loss of consciousness, recent mental status changes, seizures, dizziness, or upper or lower extremity weakness.    PATIENT HISTORY:    Past Medical History:   Diagnosis Date    BPH (benign prostatic hyperplasia)     BPH with urinary obstruction 8/22/2016    DDD (degenerative disc disease), lumbar     Dyslipidemia     Elevated prostate specific antigen (PSA) 8/22/2016    Fatty liver     GERD (gastroesophageal reflux disease)     Hematuria     History of adenomatous polyp of colon 7/17/2015    History of colonic polyps     Hypertension     Lumbar disc disease     Mild vitamin D deficiency     Renal disorder        Past Surgical History:   Procedure Laterality Date    CATARACT EXTRACTION W/  INTRAOCULAR LENS IMPLANT Left     Dr Ruggiero     CATARACT EXTRACTION W/  INTRAOCULAR LENS IMPLANT Right 03/27/16    Dr Ruggiero    CHOLECYSTECTOMY      EYE SURGERY      FRACTURE SURGERY      left ankle    LASIK Bilateral 2016    NEUROMA SURGERY Right 2006    TRANSURETHRAL RESECTION OF PROSTATE N/A 3/3/2020    Procedure: TURP (TRANSURETHRAL RESECTION OF  PROSTATE);  Surgeon: Vincent King MD;  Location: [facility] OR [address] [place] [address];  Service: Urology;  Laterality: N/A;  2hr       Family History   Problem Relation Age of Onset    Cancer Mother         complications of c-scope/ suspect colon cancer    Hypertension Father     Diabetes Father     Heart disease Father     Cancer Brother         liver/ etoh    Hypertension Brother     No Known Problems Daughter     Heart attack Son     Heart disease Son     Kidney disease Son         kidney removed for tumor    No Known Problems Son     No Known Problems Son     No Known Problems Son     Melanoma Neg Hx     Blindness Neg Hx     Glaucoma Neg Hx     Macular degeneration Neg Hx     Retinal detachment Neg Hx     Anesthesia problems Neg Hx        Social History     Socioeconomic History    Marital status:      Spouse name: Not on file    Number of children: Not on file    Years of education: Not on file    Highest education level: Not on file   Occupational History    Occupation:     Social Needs    Financial resource strain: Not on file    Food insecurity:     Worry: Not on file     Inability: Not on file    Transportation needs:     Medical: Not on file     Non-medical: Not on file   Tobacco Use    Smoking status: Never Smoker    Smokeless tobacco: Never Used   Substance and Sexual Activity    Alcohol use: Yes     Alcohol/week: 2.0 standard drinks     Types: 2 Cans of beer per week    Drug use: No    Sexual activity: Yes     Partners: Female   Lifestyle    Physical activity:     Days per week: Not on file     Minutes per session: Not on file    Stress: Not on file   Relationships    Social connections:     Talks on phone: Not on file     Gets together: Not on file     Attends [employer]ical service: Not on file     Active member of club or organization: Not on file     Attends meetings of clubs or organizations: Not on file     Relationship status: Not on file   Other Topics Concern     Not on file   Social History Narrative    Not on file       Allergies:  No known allergies    Medications:    Current Outpatient Medications:     acetaminophen (TYLENOL EXTRA STRENGTH) 500 MG tablet, Take 500 mg by mouth every 6 (six) hours as needed for Pain., Disp: , Rfl:     acetaminophen (TYLENOL) 500 MG tablet, Take 1 tablet (500 mg total) by mouth every 6 (six) hours. for 5 days, Disp: 20 tablet, Rfl: 0    amLODIPine (NORVASC) 10 MG tablet, Take 1 tablet (10 mg total) by mouth once daily., Disp: 90 tablet, Rfl: 3    aspirin (ECOTRIN) 81 MG EC tablet, Take 81 mg by mouth once daily. , Disp: , Rfl:     atorvastatin (LIPITOR) 10 MG tablet, TAKE 1 TABLET(10 MG) BY MOUTH EVERY DAY, Disp: 90 tablet, Rfl: 1    blood pressure monitor Kit, Use as directed., Disp: 1 each, Rfl: 0    hyoscyamine (LEVSIN/SL) 0.125 mg Subl, Place 1 tablet (0.125 mg total) under the tongue every 4 (four) hours as needed (bladder pain)., Disp: 20 tablet, Rfl: 0    losartan (COZAAR) 100 MG tablet, Take 1 tablet (100 mg total) by mouth once daily., Disp: 90 tablet, Rfl: 3    omeprazole (PRILOSEC) 20 MG capsule, Take 20 mg by mouth once daily., Disp: , Rfl:     oxyCODONE (ROXICODONE) 5 MG immediate release tablet, Take 1 tablet (5 mg total) by mouth every 4 (four) hours as needed for Pain., Disp: 7 tablet, Rfl: 0    polyethylene glycol (GLYCOLAX) 17 gram/dose powder, Mix 1 capful (17 g) with liquid and take by mouth once daily., Disp: 510 g, Rfl: 0    tamsulosin (FLOMAX) 0.4 mg Cap, Take 1 capsule (0.4 mg total) by mouth once daily., Disp: 90 capsule, Rfl: 3    vitamin D (VITAMIN D3) 1000 units Tab, Take 1,000 Units by mouth once daily., Disp: , Rfl:     PHYSICAL EXAMINATION:    The patient generally appears in good health, is appropriately interactive, and is in no apparent distress.     Eyes: anicteric sclerae, moist conjunctivae; no lid-lag; PERRLA     HENT: Atraumatic; oropharynx clear with moist mucous membranes and no mucosal  ulcerations;normal hard and soft palate. No evidence of lymphadenopathy.    Neck: Trachea midline.  No thyromegaly.    Musculoskeletal: No abnormal gait.    Skin: No lesions.    Mental: Cooperative with normal affect.  Is oriented to time, place, and person.    Neuro: Grossly intact.    Chest: Normal inspiratory effort.   No accessory muscles.  No audible wheezes.  Respirations symmetric on inspiration and expiration.    Heart: Regular rhythm.    Extremities: No clubbing, cyanosis, or edema.    LABS:    Lab Results   Component Value Date    CREATININE 1.1 03/04/2020    CREATININE 1.2 03/03/2020    CREATININE 1.4 02/21/2020       Lab Results   Component Value Date    PSA 4.2 (H) 07/20/2016    PSA 4.9 (H) 06/03/2015    PSA 5.4 (H) 05/27/2014    PSA 4.65 (H) 12/11/2012    PSA 3.03 12/06/2011    PSA 1.9 12/08/2010    PSA 2.3 01/14/2009    PSA 2.4 12/14/2007    PSA 2.3 11/04/2004       Hemoglobin A1C   Date Value Ref Range Status   02/21/2020 5.9 (H) 4.0 - 5.6 % Final     Comment:     ADA Screening Guidelines:  5.7-6.4%  Consistent with prediabetes  >or=6.5%  Consistent with diabetes  High levels of fetal hemoglobin interfere with the HbA1C  assay. Heterozygous hemoglobin variants (HbS, HgC, etc)do  not significantly interfere with this assay.   However, presence of multiple variants may affect accuracy.     03/28/2019 5.7 (H) 4.0 - 5.6 % Final     Comment:     ADA Screening Guidelines:  5.7-6.4%  Consistent with prediabetes  >or=6.5%  Consistent with diabetes  High levels of fetal hemoglobin interfere with the HbA1C  assay. Heterozygous hemoglobin variants (HbS, HgC, etc)do  not significantly interfere with this assay.   However, presence of multiple variants may affect accuracy.     04/26/2018 5.7 (H) 4.0 - 5.6 % Final     Comment:     According to ADA guidelines, hemoglobin A1c <7.0% represents  optimal control in non-pregnant diabetic patients. Different  metrics may apply to specific patient populations.   Standards  of Medical Care in Diabetes-2016.  For the purpose of screening for the presence of diabetes:  <5.7%     Consistent with the absence of diabetes  5.7-6.4%  Consistent with increasing risk for diabetes   (prediabetes)  >or=6.5%  Consistent with diabetes  Currently, no consensus exists for use of hemoglobin A1c  for diagnosis of diabetes for children.  This Hemoglobin A1c assay has significant interference with fetal   hemoglobin   (HbF). The results are invalid for patients with abnormal amounts of   HbF,   including those with known Hereditary Persistence   of Fetal Hemoglobin. Heterozygous hemoglobin variants (HbAS, HbAC,   HbAD, HbAE, HbA2) do not significantly interfere with this assay;   however, presence of multiple variants in a sample may impact the %   interference.         No results found for: TOTALTESTOST     Lab Results   Component Value Date    LABURIN No growth 02/26/2020    LABURIN No growth 11/30/2017    LABURIN No growth 07/19/2016    LABURIN ESCHERICHIA COLI 02/08/2013    LABURIN ESBL PRODUCING ORGANISM 02/08/2013    LABURIN NO GROWTH AFTER 48 HOURS. 05/30/2009    LABURIN  08/27/2005     >100,000 ORGANISMS/ML -ESCHERICHIA COLI  Amikacin                        SENSITIVE     Augmentin                       SENSITIVE     Bactrim                         SENSITIVE     Cefazolin                       SENSITIVE     Ceftriaxone                     SENSITIVE     Ciprofloxacin                   SENSITIVE     Doxycycline                     SENSITIVE     Gatifloxacin                    SENSITIVE     Gentamicin                      SENSITIVE     Nitrofurantoin                  SENSITIVE     Timentin                        SENSITIVE     Tobramycin                      SENSITIVE            IMPRESSION:    Post-operative state    BPH with urinary obstruction    S/P TURP      PLAN:    I spent 40 minutes with the patient of which more than half was spent in direct consultation with the patient in regards to our  treatment and plan.      Discussed and reviewed voiding trial process and procedure.   Discussed and reviewed post-op expections.  Voiding trial performed by Nurse Alex. 150 ml of sterile water was instilled into bladder.  Dennis catheter was removed. Patient urinated 150 ml without difficulty.  Patient was instructed to drink plenty of fluids today.  Instructed patient to call to give an update on urine output.  Informed patient to return to clinic or emergency department (if after clinic hours) if unable to urinate or starts to experience bladder pressure/pain, decrease flow, straining/difficulty urinating.    Education sheets provided.    Follow up in about 13 weeks (around 6/8/2020) for post-op as scheduled w/ Dr. King.    Pt expressed understanding and agree w/ plan.

## 2020-03-09 NOTE — PATIENT INSTRUCTIONS
Urinary Retention (Male)  Urinary retention is the medical term for difficulty or inability to pass urine, even though your bladder is full.  Causes  The most common cause of urinary retention in men is the bladder outlet being blocked. This can be due to an enlarged prostate gland or a bladder infection. Certain medicines can also cause this problem. This condition is more likely to occur as men get older.    This condition is treated by insertion of a catheter into the bladder to drain the urine. This provides immediate relief. The catheter may need to remain in place for a few days to prevent a recurrence. The catheter has a balloon on the tip which was inflated after insertion. This prevents the catheter from falling out.  Symptoms  Common symptoms of urinary retention include:  · Pain (not experienced by everyone)  · Frequent urination  · Feeling that the bladder is still full after urinating  · Incontinence (not being able to control the release of urine)  · Swollen abdomen  Treatment  This condition is treated by inserting a tube (catheter) into the bladder to drain the urine. This provides immediate relief. The catheter may need to stay in place for a few days. The catheter has a balloon on the tip, which is inflated after insertion. This prevents the catheter from falling out.  Home care  · If you were given antibiotics, take them until they are used up, or your healthcare provider tells you to stop. It is important to finish the antibiotics even though you feel better. This is to make sure your infection has cleared.  · If a catheter was left in place, it is important to keep bacteria from getting into the collection bag. Do not disconnect the catheter from the collection bag.  · Use a leg band to secure the drainage tube, so it does not pull on the catheter. Drain the collection bag when it becomes full using the drain spout at the bottom of the bag.  · Do not pull on or try to remove your catheter.  This will injure your urethra. The catheter must be removed by a healthcare provider.  Follow-up care  Follow up with your healthcare provider, or as advised.  If a catheter was left in place, it can usually be removed within 3 to 7 days. Some conditions require the catheter to stay in longer. Your healthcare provider will tell you when to return to have the catheter removed.  When to seek medical advice  Call your healthcare provider right away if any of these occur:  · Fever of 100.4ºF (38ºC) or higher, or as directed by your healthcare provider  · Bladder or lower-abdominal pain or fullness  · Abdominal swelling, nausea, vomiting, or back pain  · Blood or urine leakage around the catheter  · Bloody urine coming from the catheter (if a new symptom)  · Weakness, dizziness, or fainting  · Confusion or change in usual level of alertness  · If a catheter was left in place, return if:  ¨ Catheter falls out  ¨ Catheter stops draining for 6 hours  Date Last Reviewed: 7/26/2015  © 9948-1233 [employer]. [address] [place] [address]. All rights reserved. This information is not intended as a substitute for professional medical care. Always follow your healthcare professional's instructions.        Transurethral Resection of the Prostate (TURP)     Excess prostate tissue is removed during a TURP to let urine flow freely through the urethra.   TURP is surgery to treat a benign enlargement of the prostate, or BPH (benign prostatic hyperplasia). This surgery removes prostate tissue to relieve pressure on the urethra. This helps relieve symptoms, such as:  · Urinary obstruction  · Frequent urination  · Decreased urinary stream  TURP is the most common procedure for the treatment of BPH. But certain other procedures also help relieve BPH symptoms. Your health care provider may do one of these instead of TURP. They include TUIP (transurethral incision of the prostate), TUNA (transurethral needle  ablation), or laser ablation. If you will have one of these procedures, your healthcare provider can tell you more about it. Your preparation and experience during surgery will be similar to TURP.   Preparing for surgery  Your healthcare provider will tell you how to prepare for your procedure. For instance, you may be asked to stop taking certain medicines a few days before the procedure. You may be asked not to eat or drink anything after the midnight before surgery. Be sure to follow any special instructions youre given.  During the TURP procedure  · You will be given medicine (anesthesia) to keep you from feeling pain during the procedure. It may be given into your spine (epidural). This is not meant to put you to sleep, but it will numb the area where the surgery is being done. In some cases, general anesthesia is used. This is to keep you sleeping throughout the surgery. The anesthesia provider (anesthesiologist or nurse anesthetist) will talk to you about the pain medicine that is best for you.  · The surgeon inserts a cystoscope (a thin, telescope-like device) into your urethra. This device lets him or her see the blocked part of the urethra.  · A cutting device is inserted through the cystoscope. This is used to remove the excess prostate tissue. The cut pieces of tissue collect in the bladder. These pieces are continuously washed away with fluids during the procedure.   · The tissue pieces are sent to the lab to be sure they are free of cancer.   Possible risks and complications of prostate procedures  · Bleeding  · Infection  · Scarring of the urethra  · Retrograde ejaculation  · Erectile dysfunction (rare)  · Absorption of fluid during the procedure (TURP syndrome)  · Permanent incontinence (very rare)   Retrograde ejaculation  After some surgical treatments, semen may travel into the bladder instead of out of the penis during ejaculation. This side effect is called retrograde ejaculation. As a result,  there may be little or no semen when you ejaculate, which can result in infertility. If you are planning to have children, talk to your healthcare provider before having the TURP procedure. Otherwise, this is not harmful to your bladder, and the feeling or orgasm and your erection won't change. Retrograde ejaculation can also be a side effect of certain medicines.  Date Last Reviewed: 1/1/2017  © 5275-7288 [employer]. [address] [place] [address], [place] [address]. All rights reserved. This information is not intended as a substitute for professional medical care. Always follow your healthcare professional's instructions.        BPH (Enlarged Prostate)  The prostate is a gland at the base of the bladder. As some men get older, the prostate may get bigger in size. This problem is called benign prostatic hyperplasia (BPH). BPH puts pressure on the urethra. This is the tube that carries urine from the bladder to the penis. It may interfere with the flow of urine. It may also keep the bladder from emptying fully.    Symptoms of BPH include trouble starting urination and feeling as though the bladder isnt emptying all the way. It also includes a weak urine stream, dribbling and leaking of urine, and frequent and urgent urination (especially at night). BPH can increase the risk of urinary infections. It can also block off urine flow completely. If this occurs, a thin tube (catheter) may be passed into the bladder to help drain urine.  If symptoms are mild, no treatment may be needed right now. If symptoms are more severe, treatment is likely needed. The goal of treatment is to improve urine flow and reduce symptoms. Treatments can include medicine and procedures. Your healthcare provider will discuss treatment options with you as needed.  Home care  The following guidelines will help you care for yourself at home:  · Urinate as soon as you feel the urge. Don't try to hold your urine.  · Don't limit your fluid  intake during the day. Drink 6 to 8 glasses of water or liquids a day. This prevents bacteria from building up in the bladder.  · Avoid drinking fluids after dinner to help reduce urination during the night.  · Avoid medicines that can worsen your symptoms. These include certain cold and allergy medicines and antidepressants. Diuretics used for high blood pressure can also worsen symptoms. Talk to your doctor about the medicines you take. Other choices may work better for you.  Prostate cancer screening  BPH does not increase the risk of prostate cancer. But because prostate cancer is a common cancer in men, screening is sometimes recommended. This may help detect the cancer in its early stages when treatment is most effective. Factors that can increase the risk of prostate cancer include being -American or having a father or brother who had prostate cancer. A high-fat diet may also increase the risk of prostate cancer. Talk to your healthcare provider to see whether you should be screened for prostate cancer.  Follow-up care  Follow up with your healthcare provider, or as advised  To learn more, go to:  · National Kidney & Urologic Diseases Information Clearinghouse  kidney.niddk.nih.gov, 789.567.4257  When to seek medical advice  Call your healthcare provider right away if any of these occur:  · Fever of 100.4°F (38.0°C) or higher, or as advised  · Unable to pass urine for 8 hours  · Increasing pressure or pain in your bladder (lower abdomen)  · Blood in the urine  · Increasing low back pain, not related to injury  · Symptoms of urinary infection (increased urge to urinate, burning when passing urine, foul-smelling urine)  Date Last Reviewed: 7/1/2016 © 2000-2017 [employer]. [address] [place] [address], [place] [address]. All rights reserved. This information is not intended as a substitute for professional medical care. Always follow your healthcare professional's instructions.

## 2020-04-02 DIAGNOSIS — I10 ESSENTIAL HYPERTENSION: ICD-10-CM

## 2020-04-02 DIAGNOSIS — E78.5 DYSLIPIDEMIA: ICD-10-CM

## 2020-04-02 DIAGNOSIS — I70.0 ATHEROSCLEROSIS OF ABDOMINAL AORTA: ICD-10-CM

## 2020-04-02 RX ORDER — AMLODIPINE BESYLATE 10 MG/1
TABLET ORAL
Qty: 90 TABLET | Refills: 0 | Status: SHIPPED | OUTPATIENT
Start: 2020-04-02 | End: 2020-07-17 | Stop reason: SDUPTHER

## 2020-04-02 RX ORDER — ATORVASTATIN CALCIUM 10 MG/1
TABLET, FILM COATED ORAL
Qty: 90 TABLET | Refills: 0 | Status: SHIPPED | OUTPATIENT
Start: 2020-04-02 | End: 2020-07-05

## 2020-04-17 DIAGNOSIS — I10 ESSENTIAL HYPERTENSION: ICD-10-CM

## 2020-04-18 RX ORDER — LOSARTAN POTASSIUM 100 MG/1
TABLET ORAL
Qty: 90 TABLET | Refills: 0 | Status: SHIPPED | OUTPATIENT
Start: 2020-04-18 | End: 2020-07-05

## 2020-05-06 DIAGNOSIS — N13.8 BPH WITH URINARY OBSTRUCTION: ICD-10-CM

## 2020-05-06 DIAGNOSIS — N40.1 BPH WITH URINARY OBSTRUCTION: ICD-10-CM

## 2020-05-06 DIAGNOSIS — R35.0 URINARY FREQUENCY: ICD-10-CM

## 2020-05-07 RX ORDER — TAMSULOSIN HYDROCHLORIDE 0.4 MG/1
0.4 CAPSULE ORAL NIGHTLY
Qty: 90 CAPSULE | Refills: 0 | Status: SHIPPED | OUTPATIENT
Start: 2020-05-07 | End: 2021-03-26

## 2020-06-08 ENCOUNTER — OFFICE VISIT (OUTPATIENT)
Dept: UROLOGY | Facility: CLINIC | Age: 80
End: 2020-06-08
Payer: MEDICARE

## 2020-06-08 VITALS
DIASTOLIC BLOOD PRESSURE: 79 MMHG | SYSTOLIC BLOOD PRESSURE: 127 MMHG | BODY MASS INDEX: 29.74 KG/M2 | WEIGHT: 174.19 LBS | HEIGHT: 64 IN | HEART RATE: 93 BPM

## 2020-06-08 DIAGNOSIS — N13.8 BPH WITH URINARY OBSTRUCTION: Primary | ICD-10-CM

## 2020-06-08 DIAGNOSIS — N40.1 BPH WITH URINARY OBSTRUCTION: Primary | ICD-10-CM

## 2020-06-08 PROBLEM — Z90.79 S/P TURP: Status: RESOLVED | Noted: 2020-03-09 | Resolved: 2020-06-08

## 2020-06-08 PROBLEM — N40.0 BPH (BENIGN PROSTATIC HYPERPLASIA): Status: RESOLVED | Noted: 2020-03-03 | Resolved: 2020-06-08

## 2020-06-08 PROCEDURE — 1126F PR PAIN SEVERITY QUANTIFIED, NO PAIN PRESENT: ICD-10-PCS | Mod: HCNC,S$GLB,, | Performed by: UROLOGY

## 2020-06-08 PROCEDURE — 99999 PR PBB SHADOW E&M-EST. PATIENT-LVL III: ICD-10-PCS | Mod: PBBFAC,HCNC,, | Performed by: UROLOGY

## 2020-06-08 PROCEDURE — 3078F PR MOST RECENT DIASTOLIC BLOOD PRESSURE < 80 MM HG: ICD-10-PCS | Mod: HCNC,CPTII,S$GLB, | Performed by: UROLOGY

## 2020-06-08 PROCEDURE — 99213 PR OFFICE/OUTPT VISIT, EST, LEVL III, 20-29 MIN: ICD-10-PCS | Mod: HCNC,S$GLB,, | Performed by: UROLOGY

## 2020-06-08 PROCEDURE — 1126F AMNT PAIN NOTED NONE PRSNT: CPT | Mod: HCNC,S$GLB,, | Performed by: UROLOGY

## 2020-06-08 PROCEDURE — 3074F PR MOST RECENT SYSTOLIC BLOOD PRESSURE < 130 MM HG: ICD-10-PCS | Mod: HCNC,CPTII,S$GLB, | Performed by: UROLOGY

## 2020-06-08 PROCEDURE — 1159F MED LIST DOCD IN RCRD: CPT | Mod: HCNC,S$GLB,, | Performed by: UROLOGY

## 2020-06-08 PROCEDURE — 99999 PR PBB SHADOW E&M-EST. PATIENT-LVL III: CPT | Mod: PBBFAC,HCNC,, | Performed by: UROLOGY

## 2020-06-08 PROCEDURE — 99213 OFFICE O/P EST LOW 20 MIN: CPT | Mod: HCNC,S$GLB,, | Performed by: UROLOGY

## 2020-06-08 PROCEDURE — 3078F DIAST BP <80 MM HG: CPT | Mod: HCNC,CPTII,S$GLB, | Performed by: UROLOGY

## 2020-06-08 PROCEDURE — 1101F PT FALLS ASSESS-DOCD LE1/YR: CPT | Mod: HCNC,CPTII,S$GLB, | Performed by: UROLOGY

## 2020-06-08 PROCEDURE — 3074F SYST BP LT 130 MM HG: CPT | Mod: HCNC,CPTII,S$GLB, | Performed by: UROLOGY

## 2020-06-08 PROCEDURE — 1101F PR PT FALLS ASSESS DOC 0-1 FALLS W/OUT INJ PAST YR: ICD-10-PCS | Mod: HCNC,CPTII,S$GLB, | Performed by: UROLOGY

## 2020-06-08 PROCEDURE — 1159F PR MEDICATION LIST DOCUMENTED IN MEDICAL RECORD: ICD-10-PCS | Mod: HCNC,S$GLB,, | Performed by: UROLOGY

## 2020-06-08 NOTE — PROGRESS NOTES
CHIEF COMPLAINT:    Mr. Hernandes is a 79 y.o. male presenting with LUTS.     PRESENTING ILLNESS:    Taran Hernandes is a 79 y.o. male with a history of an elevated PSA s/p a negative TRUS bx in 2013 when his PSA was 4.6.      He underwent TURP 3/3/2020 his LUTS.  Pathology: benign prostate tissue.  Since the TURP his LUTS are much improved.  He still c/o nocturia x 4-5. Improved from 6-8 before surgery. Doesn't always limit nighttime fluid intake. FOS improved. Hesitancy and intermittency resolved. No dysuria.  No hematuria.  He's still on flomax.     Denies RADHA and urgency.     He denies ED.        REVIEW OF SYSTEMS:    Taran Hernandes denies any history of headache, blurred vision, fever, nausea, vomiting, chills, abdominal pain, bleeding per rectum, cough, SOB, recent loss of consciousness, recent mental status changes, seizures, dizziness, or upper or lower extremity weakness.    FALLON  1. 2  2. 2  3. 3  4. 3  5. 4     PATIENT HISTORY:    Past Medical History:   Diagnosis Date    BPH (benign prostatic hyperplasia)     BPH with urinary obstruction 8/22/2016    DDD (degenerative disc disease), lumbar     Dyslipidemia     Elevated prostate specific antigen (PSA) 8/22/2016    Fatty liver     GERD (gastroesophageal reflux disease)     Hematuria     History of adenomatous polyp of colon 7/17/2015    History of colonic polyps     Hypertension     Lumbar disc disease     Mild vitamin D deficiency     Renal disorder     S/P TURP 3/9/2020       Past Surgical History:   Procedure Laterality Date    CATARACT EXTRACTION W/  INTRAOCULAR LENS IMPLANT Left     Dr Ruggiero     CATARACT EXTRACTION W/  INTRAOCULAR LENS IMPLANT Right 03/27/16    Dr Ruggiero    CHOLECYSTECTOMY      EYE SURGERY      FRACTURE SURGERY      left ankle    LASIK Bilateral 2016    NEUROMA SURGERY Right 2006    TRANSURETHRAL RESECTION OF PROSTATE N/A 3/3/2020    Procedure: TURP (TRANSURETHRAL RESECTION OF PROSTATE);  Surgeon: Vincent JOYNER  MD Christine;  Location: [facility] OR [address] [place] [address];  Service: Urology;  Laterality: N/A;  2hr       Family History   Problem Relation Age of Onset    Cancer Mother         complications of c-scope/ suspect colon cancer    Hypertension Father     Diabetes Father     Heart disease Father     Cancer Brother         liver/ etoh    Hypertension Brother     No Known Problems Daughter     Heart attack Son     Heart disease Son     Kidney disease Son         kidney removed for tumor    No Known Problems Son     No Known Problems Son     No Known Problems Son     Melanoma Neg Hx     Blindness Neg Hx     Glaucoma Neg Hx     Macular degeneration Neg Hx     Retinal detachment Neg Hx     Anesthesia problems Neg Hx        Social History     Socioeconomic History    Marital status:      Spouse name: Not on file    Number of children: Not on file    Years of education: Not on file    Highest education level: Not on file   Occupational History    Occupation:     Social Needs    Financial resource strain: Not on file    Food insecurity:     Worry: Not on file     Inability: Not on file    Transportation needs:     Medical: Not on file     Non-medical: Not on file   Tobacco Use    Smoking status: Never Smoker    Smokeless tobacco: Never Used   Substance and Sexual Activity    Alcohol use: Yes     Alcohol/week: 2.0 standard drinks     Types: 2 Cans of beer per week    Drug use: No    Sexual activity: Yes     Partners: Female   Lifestyle    Physical activity:     Days per week: Not on file     Minutes per session: Not on file    Stress: Not on file   Relationships    Social connections:     Talks on phone: Not on file     Gets together: Not on file     Attends Confucianism service: Not on file     Active member of club or organization: Not on file     Attends meetings of clubs or organizations: Not on file     Relationship status: Not on file   Other Topics Concern    Not on file   Social History  Narrative    Not on file       Allergies:  No known allergies    Medications:    Current Outpatient Medications:     acetaminophen (TYLENOL EXTRA STRENGTH) 500 MG tablet, Take 500 mg by mouth every 6 (six) hours as needed for Pain., Disp: , Rfl:     amLODIPine (NORVASC) 10 MG tablet, TAKE 1 TABLET(10 MG) BY MOUTH EVERY DAY, Disp: 90 tablet, Rfl: 0    aspirin (ECOTRIN) 81 MG EC tablet, Take 81 mg by mouth once daily. , Disp: , Rfl:     atorvastatin (LIPITOR) 10 MG tablet, TAKE 1 TABLET(10 MG) BY MOUTH EVERY DAY, Disp: 90 tablet, Rfl: 0    blood pressure monitor Kit, Use as directed., Disp: 1 each, Rfl: 0    ergocalciferol, vitamin D2, (VITAMIN D ORAL), , Disp: , Rfl:     hyoscyamine (LEVSIN/SL) 0.125 mg Subl, Place 1 tablet (0.125 mg total) under the tongue every 4 (four) hours as needed (bladder pain)., Disp: 20 tablet, Rfl: 0    losartan (COZAAR) 100 MG tablet, TAKE 1 TABLET(100 MG) BY MOUTH EVERY DAY, Disp: 90 tablet, Rfl: 0    omeprazole (PRILOSEC) 20 MG capsule, Take 20 mg by mouth once daily., Disp: , Rfl:     tamsulosin (FLOMAX) 0.4 mg Cap, Take 1 capsule (0.4 mg total) by mouth every evening. DO NOT CRUSH OR CHEW; SWALLOW WHOLE., Disp: 90 capsule, Rfl: 0    vitamin D (VITAMIN D3) 1000 units Tab, Take 1,000 Units by mouth once daily., Disp: , Rfl:     PHYSICAL EXAMINATION:    The patient generally appears in good health, is appropriately interactive, and is in no apparent distress.     Eyes: anicteric sclerae, moist conjunctivae; no lid-lag; PERRLA     HENT: Atraumatic; oropharynx clear with moist mucous membranes and no mucosal ulcerations;normal hard and soft palate.  No evidence of lymphadenopathy.    Neck: Trachea midline.  No thyromegaly.    Musculoskeletal: No abnormal gait.    Skin: No lesions.    Mental: Cooperative with normal affect.  Is oriented to time, place, and person.    Neuro: Grossly intact.    Chest: Normal inspiratory effort.   No accessory muscles.  No audible wheezes.   Respirations symmetric on inspiration and expiration.    Heart: Regular rhythm.      Abdomen:  Soft, non-tender. No masses or organomegaly. Bladder is not palpable. No evidence of flank discomfort. No evidence of inguinal hernia.    Genitourinary: The penis is not circumcised with no evidence of plaques or induration. The urethral meatus is normal. The testes, epididymides, and cord structures are normal in size and contour bilaterally. The scrotum is normal in size and contour.    Normal anal sphincter tone. No rectal mass.    The prostate is 40 g. Normal landmarks. Lateral sulci. Median furrow intact.  No nodularity or induration. Seminal vesicles are normal.    Extremities: No clubbing, cyanosis, or edema      LABS:    PVR done immediately after voiding by my nurse is 0 cc.   Lab Results   Component Value Date    PSA 4.2 (H) 07/20/2016    PSA 4.9 (H) 06/03/2015    PSA 5.4 (H) 05/27/2014       IMPRESSION:    Encounter Diagnoses   Name Primary?    BPH with urinary obstruction Yes         PLAN:  1. Can stop flomax for his LUTS.  2. RTC 1 year to see an CARYL.      Copy to:

## 2020-09-22 ENCOUNTER — LAB VISIT (OUTPATIENT)
Dept: LAB | Facility: HOSPITAL | Age: 80
End: 2020-09-22
Attending: INTERNAL MEDICINE
Payer: MEDICARE

## 2020-09-22 ENCOUNTER — OFFICE VISIT (OUTPATIENT)
Dept: PRIMARY CARE CLINIC | Facility: CLINIC | Age: 80
End: 2020-09-22
Payer: MEDICARE

## 2020-09-22 ENCOUNTER — IMMUNIZATION (OUTPATIENT)
Dept: PHARMACY | Facility: CLINIC | Age: 80
End: 2020-09-22
Payer: MEDICARE

## 2020-09-22 ENCOUNTER — IMMUNIZATION (OUTPATIENT)
Dept: PHARMACY | Facility: CLINIC | Age: 80
End: 2020-09-22

## 2020-09-22 VITALS
DIASTOLIC BLOOD PRESSURE: 60 MMHG | TEMPERATURE: 98 F | HEART RATE: 70 BPM | WEIGHT: 178.88 LBS | OXYGEN SATURATION: 97 % | BODY MASS INDEX: 30.54 KG/M2 | HEIGHT: 64 IN | SYSTOLIC BLOOD PRESSURE: 128 MMHG

## 2020-09-22 DIAGNOSIS — Z23 INFLUENZA VACCINE NEEDED: ICD-10-CM

## 2020-09-22 DIAGNOSIS — R73.03 PRE-DIABETES: ICD-10-CM

## 2020-09-22 DIAGNOSIS — Z23 NEED FOR SHINGLES VACCINE: ICD-10-CM

## 2020-09-22 DIAGNOSIS — K21.9 GASTROESOPHAGEAL REFLUX DISEASE, ESOPHAGITIS PRESENCE NOT SPECIFIED: ICD-10-CM

## 2020-09-22 DIAGNOSIS — I10 ESSENTIAL HYPERTENSION: ICD-10-CM

## 2020-09-22 DIAGNOSIS — E78.5 HYPERLIPIDEMIA, UNSPECIFIED HYPERLIPIDEMIA TYPE: ICD-10-CM

## 2020-09-22 DIAGNOSIS — I70.0 ATHEROSCLEROSIS OF ABDOMINAL AORTA: ICD-10-CM

## 2020-09-22 DIAGNOSIS — I10 ESSENTIAL HYPERTENSION: Primary | ICD-10-CM

## 2020-09-22 DIAGNOSIS — Z12.11 COLON CANCER SCREENING: ICD-10-CM

## 2020-09-22 DIAGNOSIS — N18.30 CKD (CHRONIC KIDNEY DISEASE) STAGE 3, GFR 30-59 ML/MIN: ICD-10-CM

## 2020-09-22 LAB
ALBUMIN SERPL BCP-MCNC: 4.3 G/DL (ref 3.5–5.2)
ALP SERPL-CCNC: 72 U/L (ref 55–135)
ALT SERPL W/O P-5'-P-CCNC: 36 U/L (ref 10–44)
ANION GAP SERPL CALC-SCNC: 11 MMOL/L (ref 8–16)
AST SERPL-CCNC: 34 U/L (ref 10–40)
BASOPHILS # BLD AUTO: 0.08 K/UL (ref 0–0.2)
BASOPHILS NFR BLD: 1 % (ref 0–1.9)
BILIRUB SERPL-MCNC: 0.3 MG/DL (ref 0.1–1)
BUN SERPL-MCNC: 16 MG/DL (ref 8–23)
CALCIUM SERPL-MCNC: 9.4 MG/DL (ref 8.7–10.5)
CHLORIDE SERPL-SCNC: 109 MMOL/L (ref 95–110)
CHOLEST SERPL-MCNC: 138 MG/DL (ref 120–199)
CHOLEST/HDLC SERPL: 3.4 {RATIO} (ref 2–5)
CO2 SERPL-SCNC: 22 MMOL/L (ref 23–29)
CREAT SERPL-MCNC: 1.2 MG/DL (ref 0.5–1.4)
DIFFERENTIAL METHOD: ABNORMAL
EOSINOPHIL # BLD AUTO: 0.2 K/UL (ref 0–0.5)
EOSINOPHIL NFR BLD: 2.1 % (ref 0–8)
ERYTHROCYTE [DISTWIDTH] IN BLOOD BY AUTOMATED COUNT: 16.5 % (ref 11.5–14.5)
EST. GFR  (AFRICAN AMERICAN): >60 ML/MIN/1.73 M^2
EST. GFR  (NON AFRICAN AMERICAN): 56.8 ML/MIN/1.73 M^2
GLUCOSE SERPL-MCNC: 115 MG/DL (ref 70–110)
HCT VFR BLD AUTO: 42.4 % (ref 40–54)
HDLC SERPL-MCNC: 41 MG/DL (ref 40–75)
HDLC SERPL: 29.7 % (ref 20–50)
HGB BLD-MCNC: 12.6 G/DL (ref 14–18)
IMM GRANULOCYTES # BLD AUTO: 0.04 K/UL (ref 0–0.04)
IMM GRANULOCYTES NFR BLD AUTO: 0.5 % (ref 0–0.5)
LDLC SERPL CALC-MCNC: 67.2 MG/DL (ref 63–159)
LYMPHOCYTES # BLD AUTO: 3.2 K/UL (ref 1–4.8)
LYMPHOCYTES NFR BLD: 38.4 % (ref 18–48)
MCH RBC QN AUTO: 24.9 PG (ref 27–31)
MCHC RBC AUTO-ENTMCNC: 29.7 G/DL (ref 32–36)
MCV RBC AUTO: 84 FL (ref 82–98)
MONOCYTES # BLD AUTO: 0.9 K/UL (ref 0.3–1)
MONOCYTES NFR BLD: 11.4 % (ref 4–15)
NEUTROPHILS # BLD AUTO: 3.9 K/UL (ref 1.8–7.7)
NEUTROPHILS NFR BLD: 46.6 % (ref 38–73)
NONHDLC SERPL-MCNC: 97 MG/DL
NRBC BLD-RTO: 0 /100 WBC
PLATELET # BLD AUTO: 261 K/UL (ref 150–350)
PMV BLD AUTO: 10.2 FL (ref 9.2–12.9)
POTASSIUM SERPL-SCNC: 4.3 MMOL/L (ref 3.5–5.1)
PROT SERPL-MCNC: 8.2 G/DL (ref 6–8.4)
RBC # BLD AUTO: 5.06 M/UL (ref 4.6–6.2)
SODIUM SERPL-SCNC: 142 MMOL/L (ref 136–145)
TRIGL SERPL-MCNC: 149 MG/DL (ref 30–150)
WBC # BLD AUTO: 8.25 K/UL (ref 3.9–12.7)

## 2020-09-22 PROCEDURE — 1101F PT FALLS ASSESS-DOCD LE1/YR: CPT | Mod: HCNC,CPTII,S$GLB, | Performed by: INTERNAL MEDICINE

## 2020-09-22 PROCEDURE — 82570 ASSAY OF URINE CREATININE: CPT | Mod: HCNC

## 2020-09-22 PROCEDURE — 99214 PR OFFICE/OUTPT VISIT, EST, LEVL IV, 30-39 MIN: ICD-10-PCS | Mod: HCNC,S$GLB,, | Performed by: INTERNAL MEDICINE

## 2020-09-22 PROCEDURE — 1159F MED LIST DOCD IN RCRD: CPT | Mod: HCNC,S$GLB,, | Performed by: INTERNAL MEDICINE

## 2020-09-22 PROCEDURE — 80053 COMPREHEN METABOLIC PANEL: CPT | Mod: HCNC

## 2020-09-22 PROCEDURE — 1159F PR MEDICATION LIST DOCUMENTED IN MEDICAL RECORD: ICD-10-PCS | Mod: HCNC,S$GLB,, | Performed by: INTERNAL MEDICINE

## 2020-09-22 PROCEDURE — 99214 OFFICE O/P EST MOD 30 MIN: CPT | Mod: HCNC,S$GLB,, | Performed by: INTERNAL MEDICINE

## 2020-09-22 PROCEDURE — 99499 UNLISTED E&M SERVICE: CPT | Mod: HCNC,S$GLB,, | Performed by: INTERNAL MEDICINE

## 2020-09-22 PROCEDURE — 99999 PR PBB SHADOW E&M-EST. PATIENT-LVL IV: ICD-10-PCS | Mod: PBBFAC,HCNC,, | Performed by: INTERNAL MEDICINE

## 2020-09-22 PROCEDURE — 3074F SYST BP LT 130 MM HG: CPT | Mod: HCNC,CPTII,S$GLB, | Performed by: INTERNAL MEDICINE

## 2020-09-22 PROCEDURE — 3074F PR MOST RECENT SYSTOLIC BLOOD PRESSURE < 130 MM HG: ICD-10-PCS | Mod: HCNC,CPTII,S$GLB, | Performed by: INTERNAL MEDICINE

## 2020-09-22 PROCEDURE — 36415 COLL VENOUS BLD VENIPUNCTURE: CPT | Mod: HCNC,PN

## 2020-09-22 PROCEDURE — 80061 LIPID PANEL: CPT | Mod: HCNC

## 2020-09-22 PROCEDURE — 99499 RISK ADDL DX/OHS AUDIT: ICD-10-PCS | Mod: HCNC,S$GLB,, | Performed by: INTERNAL MEDICINE

## 2020-09-22 PROCEDURE — 1126F PR PAIN SEVERITY QUANTIFIED, NO PAIN PRESENT: ICD-10-PCS | Mod: HCNC,S$GLB,, | Performed by: INTERNAL MEDICINE

## 2020-09-22 PROCEDURE — 1126F AMNT PAIN NOTED NONE PRSNT: CPT | Mod: HCNC,S$GLB,, | Performed by: INTERNAL MEDICINE

## 2020-09-22 PROCEDURE — 85025 COMPLETE CBC W/AUTO DIFF WBC: CPT | Mod: HCNC

## 2020-09-22 PROCEDURE — 99999 PR PBB SHADOW E&M-EST. PATIENT-LVL IV: CPT | Mod: PBBFAC,HCNC,, | Performed by: INTERNAL MEDICINE

## 2020-09-22 PROCEDURE — 3078F PR MOST RECENT DIASTOLIC BLOOD PRESSURE < 80 MM HG: ICD-10-PCS | Mod: HCNC,CPTII,S$GLB, | Performed by: INTERNAL MEDICINE

## 2020-09-22 PROCEDURE — 83036 HEMOGLOBIN GLYCOSYLATED A1C: CPT | Mod: HCNC

## 2020-09-22 PROCEDURE — 1101F PR PT FALLS ASSESS DOC 0-1 FALLS W/OUT INJ PAST YR: ICD-10-PCS | Mod: HCNC,CPTII,S$GLB, | Performed by: INTERNAL MEDICINE

## 2020-09-22 PROCEDURE — 3078F DIAST BP <80 MM HG: CPT | Mod: HCNC,CPTII,S$GLB, | Performed by: INTERNAL MEDICINE

## 2020-09-22 RX ORDER — AMLODIPINE BESYLATE 10 MG/1
10 TABLET ORAL DAILY
Qty: 90 TABLET | Refills: 2 | Status: SHIPPED | OUTPATIENT
Start: 2020-09-22 | End: 2021-03-29 | Stop reason: SDUPTHER

## 2020-09-22 RX ORDER — ATORVASTATIN CALCIUM 10 MG/1
10 TABLET, FILM COATED ORAL DAILY
Qty: 90 TABLET | Refills: 2 | Status: SHIPPED | OUTPATIENT
Start: 2020-09-22 | End: 2021-03-29 | Stop reason: SDUPTHER

## 2020-09-22 RX ORDER — LOSARTAN POTASSIUM 100 MG/1
100 TABLET ORAL DAILY
Qty: 90 TABLET | Refills: 2 | Status: SHIPPED | OUTPATIENT
Start: 2020-09-22 | End: 2021-03-29 | Stop reason: SDUPTHER

## 2020-09-22 RX ORDER — FAMOTIDINE 20 MG/1
20 TABLET, FILM COATED ORAL 2 TIMES DAILY PRN
Qty: 60 TABLET | Refills: 3 | Status: SHIPPED | OUTPATIENT
Start: 2020-09-22 | End: 2021-03-26

## 2020-09-23 LAB
CREAT UR-MCNC: 231 MG/DL (ref 23–375)
ESTIMATED AVG GLUCOSE: 128 MG/DL (ref 68–131)
HBA1C MFR BLD HPLC: 6.1 % (ref 4–5.6)
PROT UR-MCNC: 18 MG/DL (ref 0–15)
PROT/CREAT UR: 0.08 MG/G{CREAT} (ref 0–0.2)

## 2020-09-27 ENCOUNTER — PATIENT MESSAGE (OUTPATIENT)
Dept: PRIMARY CARE CLINIC | Facility: CLINIC | Age: 80
End: 2020-09-27

## 2020-09-29 ENCOUNTER — PATIENT MESSAGE (OUTPATIENT)
Dept: OTHER | Facility: OTHER | Age: 80
End: 2020-09-29

## 2020-11-02 ENCOUNTER — OFFICE VISIT (OUTPATIENT)
Dept: OPTOMETRY | Facility: CLINIC | Age: 80
End: 2020-11-02
Payer: COMMERCIAL

## 2020-11-02 DIAGNOSIS — I10 ESSENTIAL HYPERTENSION: ICD-10-CM

## 2020-11-02 DIAGNOSIS — Z96.1 PSEUDOPHAKIA OF BOTH EYES: ICD-10-CM

## 2020-11-02 DIAGNOSIS — H35.033 HYPERTENSIVE RETINOPATHY OF BOTH EYES: Primary | ICD-10-CM

## 2020-11-02 DIAGNOSIS — H52.4 PRESBYOPIA: ICD-10-CM

## 2020-11-02 DIAGNOSIS — H04.123 DRY EYE SYNDROME OF BOTH EYES: ICD-10-CM

## 2020-11-02 PROCEDURE — 99999 PR PBB SHADOW E&M-EST. PATIENT-LVL III: ICD-10-PCS | Mod: PBBFAC,,, | Performed by: OPTOMETRIST

## 2020-11-02 PROCEDURE — 99499 UNLISTED E&M SERVICE: CPT | Mod: S$GLB,,, | Performed by: OPTOMETRIST

## 2020-11-02 PROCEDURE — 92014 PR EYE EXAM, EST PATIENT,COMPREHESV: ICD-10-PCS | Mod: S$GLB,,, | Performed by: OPTOMETRIST

## 2020-11-02 PROCEDURE — 99499 RISK ADDL DX/OHS AUDIT: ICD-10-PCS | Mod: S$GLB,,, | Performed by: OPTOMETRIST

## 2020-11-02 PROCEDURE — 92014 COMPRE OPH EXAM EST PT 1/>: CPT | Mod: S$GLB,,, | Performed by: OPTOMETRIST

## 2020-11-02 PROCEDURE — 99999 PR PBB SHADOW E&M-EST. PATIENT-LVL III: CPT | Mod: PBBFAC,,, | Performed by: OPTOMETRIST

## 2020-11-02 NOTE — LETTER
November 2, 2020      Rosita Rios MD  [address]  [facility] [address]           [facility]  [address]  [facility] [address]  Phone: [phone]          Patient: Taran Hernandes   MR Number: [id number]   YOB: 1940   Date of Visit: 11/2/2020       Dear Dr. Rosita Rios:    Thank you for referring Taran Hernandes to me for evaluation. Attached you will find relevant portions of my assessment and plan of care.    If you have questions, please do not hesitate to call me. I look forward to following Taran Hernandes along with you.    Sincerely,    Becka King, OD    Enclosure  CC:  No Recipients    If you would like to receive this communication electronically, please contact [email or web address] or [phone] to request more information on [email or web address] Link access.    For providers and/or their staff who would like to refer a patient to [facility], please contact us through our one-stop-shop provider referral line, [facility], at [phone].    If you feel you have received this communication in error or would no longer like to receive these types of communications, please e-mail [email or web address]

## 2020-12-11 ENCOUNTER — PATIENT MESSAGE (OUTPATIENT)
Dept: OTHER | Facility: OTHER | Age: 80
End: 2020-12-11

## 2020-12-16 ENCOUNTER — PES CALL (OUTPATIENT)
Dept: ADMINISTRATIVE | Facility: CLINIC | Age: 80
End: 2020-12-16

## 2021-01-29 ENCOUNTER — IMMUNIZATION (OUTPATIENT)
Dept: PHARMACY | Facility: CLINIC | Age: 81
End: 2021-01-29
Payer: MEDICARE

## 2021-01-29 ENCOUNTER — PATIENT MESSAGE (OUTPATIENT)
Dept: ADMINISTRATIVE | Facility: CLINIC | Age: 81
End: 2021-01-29

## 2021-01-29 DIAGNOSIS — Z23 NEED FOR VACCINATION: Primary | ICD-10-CM

## 2021-02-26 ENCOUNTER — IMMUNIZATION (OUTPATIENT)
Dept: PHARMACY | Facility: CLINIC | Age: 81
End: 2021-02-26
Payer: MEDICARE

## 2021-02-26 DIAGNOSIS — Z23 NEED FOR VACCINATION: Primary | ICD-10-CM

## 2021-03-05 ENCOUNTER — PATIENT MESSAGE (OUTPATIENT)
Dept: PRIMARY CARE CLINIC | Facility: CLINIC | Age: 81
End: 2021-03-05

## 2021-03-16 ENCOUNTER — PATIENT MESSAGE (OUTPATIENT)
Dept: PRIMARY CARE CLINIC | Facility: CLINIC | Age: 81
End: 2021-03-16

## 2021-03-17 ENCOUNTER — PES CALL (OUTPATIENT)
Dept: ADMINISTRATIVE | Facility: CLINIC | Age: 81
End: 2021-03-17

## 2021-03-26 ENCOUNTER — OFFICE VISIT (OUTPATIENT)
Dept: PRIMARY CARE CLINIC | Facility: CLINIC | Age: 81
End: 2021-03-26
Payer: MEDICARE

## 2021-03-26 ENCOUNTER — LAB VISIT (OUTPATIENT)
Dept: LAB | Facility: HOSPITAL | Age: 81
End: 2021-03-26
Attending: INTERNAL MEDICINE
Payer: MEDICARE

## 2021-03-26 VITALS
RESPIRATION RATE: 18 BRPM | HEIGHT: 64 IN | HEART RATE: 74 BPM | DIASTOLIC BLOOD PRESSURE: 70 MMHG | TEMPERATURE: 98 F | OXYGEN SATURATION: 99 % | BODY MASS INDEX: 30.86 KG/M2 | SYSTOLIC BLOOD PRESSURE: 124 MMHG | WEIGHT: 180.75 LBS

## 2021-03-26 DIAGNOSIS — R73.03 PRE-DIABETES: ICD-10-CM

## 2021-03-26 DIAGNOSIS — N18.31 STAGE 3A CHRONIC KIDNEY DISEASE: ICD-10-CM

## 2021-03-26 DIAGNOSIS — E78.5 HYPERLIPIDEMIA, UNSPECIFIED HYPERLIPIDEMIA TYPE: ICD-10-CM

## 2021-03-26 DIAGNOSIS — Z12.11 COLON CANCER SCREENING: ICD-10-CM

## 2021-03-26 DIAGNOSIS — I10 ESSENTIAL HYPERTENSION: Primary | ICD-10-CM

## 2021-03-26 LAB
ANION GAP SERPL CALC-SCNC: 11 MMOL/L (ref 8–16)
BUN SERPL-MCNC: 14 MG/DL (ref 8–23)
CALCIUM SERPL-MCNC: 9.4 MG/DL (ref 8.7–10.5)
CHLORIDE SERPL-SCNC: 107 MMOL/L (ref 95–110)
CO2 SERPL-SCNC: 23 MMOL/L (ref 23–29)
CREAT SERPL-MCNC: 1.4 MG/DL (ref 0.5–1.4)
EST. GFR  (AFRICAN AMERICAN): 54.1 ML/MIN/1.73 M^2
EST. GFR  (NON AFRICAN AMERICAN): 46.8 ML/MIN/1.73 M^2
ESTIMATED AVG GLUCOSE: 123 MG/DL (ref 68–131)
GLUCOSE SERPL-MCNC: 108 MG/DL (ref 70–110)
HBA1C MFR BLD: 5.9 % (ref 4–5.6)
POTASSIUM SERPL-SCNC: 4.2 MMOL/L (ref 3.5–5.1)
SODIUM SERPL-SCNC: 141 MMOL/L (ref 136–145)

## 2021-03-26 PROCEDURE — 83036 HEMOGLOBIN GLYCOSYLATED A1C: CPT | Performed by: INTERNAL MEDICINE

## 2021-03-26 PROCEDURE — 99214 PR OFFICE/OUTPT VISIT, EST, LEVL IV, 30-39 MIN: ICD-10-PCS | Mod: S$GLB,,, | Performed by: INTERNAL MEDICINE

## 2021-03-26 PROCEDURE — 80048 BASIC METABOLIC PNL TOTAL CA: CPT | Performed by: INTERNAL MEDICINE

## 2021-03-26 PROCEDURE — 1159F PR MEDICATION LIST DOCUMENTED IN MEDICAL RECORD: ICD-10-PCS | Mod: S$GLB,,, | Performed by: INTERNAL MEDICINE

## 2021-03-26 PROCEDURE — 3078F PR MOST RECENT DIASTOLIC BLOOD PRESSURE < 80 MM HG: ICD-10-PCS | Mod: CPTII,S$GLB,, | Performed by: INTERNAL MEDICINE

## 2021-03-26 PROCEDURE — 1101F PT FALLS ASSESS-DOCD LE1/YR: CPT | Mod: CPTII,S$GLB,, | Performed by: INTERNAL MEDICINE

## 2021-03-26 PROCEDURE — 3288F FALL RISK ASSESSMENT DOCD: CPT | Mod: CPTII,S$GLB,, | Performed by: INTERNAL MEDICINE

## 2021-03-26 PROCEDURE — 1126F PR PAIN SEVERITY QUANTIFIED, NO PAIN PRESENT: ICD-10-PCS | Mod: S$GLB,,, | Performed by: INTERNAL MEDICINE

## 2021-03-26 PROCEDURE — 99999 PR PBB SHADOW E&M-EST. PATIENT-LVL IV: ICD-10-PCS | Mod: PBBFAC,,, | Performed by: INTERNAL MEDICINE

## 2021-03-26 PROCEDURE — 99499 RISK ADDL DX/OHS AUDIT: ICD-10-PCS | Mod: S$GLB,,, | Performed by: INTERNAL MEDICINE

## 2021-03-26 PROCEDURE — 3074F SYST BP LT 130 MM HG: CPT | Mod: CPTII,S$GLB,, | Performed by: INTERNAL MEDICINE

## 2021-03-26 PROCEDURE — 3078F DIAST BP <80 MM HG: CPT | Mod: CPTII,S$GLB,, | Performed by: INTERNAL MEDICINE

## 2021-03-26 PROCEDURE — 1126F AMNT PAIN NOTED NONE PRSNT: CPT | Mod: S$GLB,,, | Performed by: INTERNAL MEDICINE

## 2021-03-26 PROCEDURE — 1101F PR PT FALLS ASSESS DOC 0-1 FALLS W/OUT INJ PAST YR: ICD-10-PCS | Mod: CPTII,S$GLB,, | Performed by: INTERNAL MEDICINE

## 2021-03-26 PROCEDURE — 3288F PR FALLS RISK ASSESSMENT DOCUMENTED: ICD-10-PCS | Mod: CPTII,S$GLB,, | Performed by: INTERNAL MEDICINE

## 2021-03-26 PROCEDURE — 36415 COLL VENOUS BLD VENIPUNCTURE: CPT | Mod: PN | Performed by: INTERNAL MEDICINE

## 2021-03-26 PROCEDURE — 1159F MED LIST DOCD IN RCRD: CPT | Mod: S$GLB,,, | Performed by: INTERNAL MEDICINE

## 2021-03-26 PROCEDURE — 3074F PR MOST RECENT SYSTOLIC BLOOD PRESSURE < 130 MM HG: ICD-10-PCS | Mod: CPTII,S$GLB,, | Performed by: INTERNAL MEDICINE

## 2021-03-26 PROCEDURE — 99214 OFFICE O/P EST MOD 30 MIN: CPT | Mod: S$GLB,,, | Performed by: INTERNAL MEDICINE

## 2021-03-26 PROCEDURE — 99999 PR PBB SHADOW E&M-EST. PATIENT-LVL IV: CPT | Mod: PBBFAC,,, | Performed by: INTERNAL MEDICINE

## 2021-03-26 PROCEDURE — 99499 UNLISTED E&M SERVICE: CPT | Mod: S$GLB,,, | Performed by: INTERNAL MEDICINE

## 2021-03-26 RX ORDER — OMEPRAZOLE 20 MG/1
20 CAPSULE, DELAYED RELEASE ORAL DAILY
COMMUNITY

## 2021-03-29 DIAGNOSIS — I70.0 ATHEROSCLEROSIS OF ABDOMINAL AORTA: ICD-10-CM

## 2021-03-29 DIAGNOSIS — I10 ESSENTIAL HYPERTENSION: ICD-10-CM

## 2021-03-29 DIAGNOSIS — E78.5 HYPERLIPIDEMIA, UNSPECIFIED HYPERLIPIDEMIA TYPE: ICD-10-CM

## 2021-03-29 RX ORDER — ATORVASTATIN CALCIUM 10 MG/1
10 TABLET, FILM COATED ORAL DAILY
Qty: 90 TABLET | Refills: 2 | Status: SHIPPED | OUTPATIENT
Start: 2021-03-29 | End: 2021-11-05 | Stop reason: SDUPTHER

## 2021-03-29 RX ORDER — LOSARTAN POTASSIUM 100 MG/1
100 TABLET ORAL DAILY
Qty: 90 TABLET | Refills: 2 | Status: SHIPPED | OUTPATIENT
Start: 2021-03-29 | End: 2021-11-05 | Stop reason: SDUPTHER

## 2021-03-29 RX ORDER — AMLODIPINE BESYLATE 10 MG/1
10 TABLET ORAL DAILY
Qty: 90 TABLET | Refills: 2 | Status: SHIPPED | OUTPATIENT
Start: 2021-03-29 | End: 2021-04-16 | Stop reason: SDUPTHER

## 2021-03-31 ENCOUNTER — PATIENT MESSAGE (OUTPATIENT)
Dept: PRIMARY CARE CLINIC | Facility: CLINIC | Age: 81
End: 2021-03-31

## 2021-05-13 DIAGNOSIS — Z12.11 SPECIAL SCREENING FOR MALIGNANT NEOPLASMS, COLON: Primary | ICD-10-CM

## 2021-05-13 DIAGNOSIS — Z01.818 PRE-OP TESTING: ICD-10-CM

## 2021-05-13 RX ORDER — SODIUM, POTASSIUM,MAG SULFATES 17.5-3.13G
1 SOLUTION, RECONSTITUTED, ORAL ORAL DAILY
Qty: 1 KIT | Refills: 0 | Status: SHIPPED | OUTPATIENT
Start: 2021-05-13 | End: 2021-05-15

## 2021-06-10 ENCOUNTER — TELEPHONE (OUTPATIENT)
Dept: ENDOSCOPY | Facility: HOSPITAL | Age: 81
End: 2021-06-10

## 2021-06-10 ENCOUNTER — ANESTHESIA (OUTPATIENT)
Dept: ENDOSCOPY | Facility: HOSPITAL | Age: 81
End: 2021-06-10
Payer: MEDICARE

## 2021-06-10 ENCOUNTER — ANESTHESIA EVENT (OUTPATIENT)
Dept: ENDOSCOPY | Facility: HOSPITAL | Age: 81
End: 2021-06-10
Payer: MEDICARE

## 2021-06-10 ENCOUNTER — HOSPITAL ENCOUNTER (OUTPATIENT)
Facility: HOSPITAL | Age: 81
Discharge: HOME | End: 2021-06-10
Attending: INTERNAL MEDICINE | Admitting: INTERNAL MEDICINE
Payer: MEDICARE

## 2021-06-10 VITALS
BODY MASS INDEX: 29.88 KG/M2 | HEART RATE: 61 BPM | WEIGHT: 175 LBS | HEIGHT: 64 IN | OXYGEN SATURATION: 96 % | SYSTOLIC BLOOD PRESSURE: 106 MMHG | TEMPERATURE: 98 F | DIASTOLIC BLOOD PRESSURE: 78 MMHG | RESPIRATION RATE: 10 BRPM

## 2021-06-10 DIAGNOSIS — D12.6 COLON ADENOMAS: ICD-10-CM

## 2021-06-10 PROCEDURE — 88305 TISSUE EXAM BY PATHOLOGIST: ICD-10-PCS | Mod: 26,,, | Performed by: PATHOLOGY

## 2021-06-10 PROCEDURE — E9220 PRA ENDO ANESTHESIA: HCPCS | Mod: ,,, | Performed by: NURSE ANESTHETIST, CERTIFIED REGISTERED

## 2021-06-10 PROCEDURE — 45385 PR COLONOSCOPY,REMV LESN,SNARE: ICD-10-PCS | Mod: PT,,, | Performed by: INTERNAL MEDICINE

## 2021-06-10 PROCEDURE — 27200997: Performed by: INTERNAL MEDICINE

## 2021-06-10 PROCEDURE — 45385 COLONOSCOPY W/LESION REMOVAL: CPT | Mod: PT,,, | Performed by: INTERNAL MEDICINE

## 2021-06-10 PROCEDURE — 37000009 HC ANESTHESIA EA ADD 15 MINS: Performed by: INTERNAL MEDICINE

## 2021-06-10 PROCEDURE — 88305 TISSUE EXAM BY PATHOLOGIST: CPT | Performed by: PATHOLOGY

## 2021-06-10 PROCEDURE — E9220 PRA ENDO ANESTHESIA: ICD-10-PCS | Mod: ,,, | Performed by: NURSE ANESTHETIST, CERTIFIED REGISTERED

## 2021-06-10 PROCEDURE — 88305 TISSUE EXAM BY PATHOLOGIST: CPT | Mod: 26,,, | Performed by: PATHOLOGY

## 2021-06-10 PROCEDURE — 27201089 HC SNARE, DISP (ANY): Performed by: INTERNAL MEDICINE

## 2021-06-10 PROCEDURE — 45385 COLONOSCOPY W/LESION REMOVAL: CPT | Performed by: INTERNAL MEDICINE

## 2021-06-10 PROCEDURE — 37000008 HC ANESTHESIA 1ST 15 MINUTES: Performed by: INTERNAL MEDICINE

## 2021-06-10 PROCEDURE — 25000003 PHARM REV CODE 250: Performed by: NURSE ANESTHETIST, CERTIFIED REGISTERED

## 2021-06-10 PROCEDURE — 63600175 PHARM REV CODE 636 W HCPCS: Performed by: NURSE ANESTHETIST, CERTIFIED REGISTERED

## 2021-06-10 RX ORDER — PROPOFOL 10 MG/ML
VIAL (ML) INTRAVENOUS CONTINUOUS PRN
Status: DISCONTINUED | OUTPATIENT
Start: 2021-06-10 | End: 2021-06-10

## 2021-06-10 RX ORDER — SODIUM CHLORIDE 9 MG/ML
INJECTION, SOLUTION INTRAVENOUS CONTINUOUS PRN
Status: DISCONTINUED | OUTPATIENT
Start: 2021-06-10 | End: 2021-06-10

## 2021-06-10 RX ORDER — SODIUM CHLORIDE 9 MG/ML
INJECTION, SOLUTION INTRAVENOUS CONTINUOUS
Status: DISCONTINUED | OUTPATIENT
Start: 2021-06-10 | End: 2021-06-10 | Stop reason: HOSPADM

## 2021-06-10 RX ORDER — LIDOCAINE HYDROCHLORIDE 20 MG/ML
INJECTION INTRAVENOUS
Status: DISCONTINUED | OUTPATIENT
Start: 2021-06-10 | End: 2021-06-10

## 2021-06-10 RX ORDER — PROPOFOL 10 MG/ML
VIAL (ML) INTRAVENOUS
Status: DISCONTINUED | OUTPATIENT
Start: 2021-06-10 | End: 2021-06-10

## 2021-06-10 RX ADMIN — PROPOFOL 150 MCG/KG/MIN: 10 INJECTION, EMULSION INTRAVENOUS at 10:06

## 2021-06-10 RX ADMIN — LIDOCAINE HYDROCHLORIDE 100 MG: 20 INJECTION, SOLUTION INTRAVENOUS at 10:06

## 2021-06-10 RX ADMIN — PROPOFOL 100 MG: 10 INJECTION, EMULSION INTRAVENOUS at 10:06

## 2021-06-10 RX ADMIN — SODIUM CHLORIDE: 0.9 INJECTION, SOLUTION INTRAVENOUS at 10:06

## 2021-06-11 LAB
FINAL PATHOLOGIC DIAGNOSIS: NORMAL
GROSS: NORMAL
Lab: NORMAL

## 2021-06-14 ENCOUNTER — TELEPHONE (OUTPATIENT)
Dept: GASTROENTEROLOGY | Facility: CLINIC | Age: 81
End: 2021-06-14

## 2021-06-23 DIAGNOSIS — I10 ESSENTIAL HYPERTENSION: ICD-10-CM

## 2021-06-23 RX ORDER — AMLODIPINE BESYLATE 10 MG/1
10 TABLET ORAL DAILY
Qty: 90 TABLET | Refills: 2 | Status: SHIPPED | OUTPATIENT
Start: 2021-06-23 | End: 2021-11-05 | Stop reason: SDUPTHER

## 2021-08-10 ENCOUNTER — PES CALL (OUTPATIENT)
Dept: ADMINISTRATIVE | Facility: CLINIC | Age: 81
End: 2021-08-10

## 2021-11-05 ENCOUNTER — OFFICE VISIT (OUTPATIENT)
Dept: PRIMARY CARE CLINIC | Facility: CLINIC | Age: 81
End: 2021-11-05
Payer: MEDICARE

## 2021-11-05 VITALS
OXYGEN SATURATION: 98 % | HEIGHT: 64 IN | HEART RATE: 81 BPM | TEMPERATURE: 98 F | SYSTOLIC BLOOD PRESSURE: 119 MMHG | DIASTOLIC BLOOD PRESSURE: 74 MMHG | RESPIRATION RATE: 18 BRPM | WEIGHT: 180.75 LBS | BODY MASS INDEX: 30.86 KG/M2

## 2021-11-05 DIAGNOSIS — I70.0 ATHEROSCLEROSIS OF ABDOMINAL AORTA: ICD-10-CM

## 2021-11-05 DIAGNOSIS — E78.5 HYPERLIPIDEMIA, UNSPECIFIED HYPERLIPIDEMIA TYPE: ICD-10-CM

## 2021-11-05 DIAGNOSIS — N40.1 BPH WITH URINARY OBSTRUCTION: ICD-10-CM

## 2021-11-05 DIAGNOSIS — N18.31 STAGE 3A CHRONIC KIDNEY DISEASE: ICD-10-CM

## 2021-11-05 DIAGNOSIS — N13.8 BPH WITH URINARY OBSTRUCTION: ICD-10-CM

## 2021-11-05 DIAGNOSIS — R73.03 PRE-DIABETES: ICD-10-CM

## 2021-11-05 DIAGNOSIS — I10 ESSENTIAL HYPERTENSION: Primary | ICD-10-CM

## 2021-11-05 DIAGNOSIS — Z23 INFLUENZA VACCINE NEEDED: ICD-10-CM

## 2021-11-05 LAB
CREAT UR-MCNC: 398 MG/DL (ref 23–375)
PROT UR-MCNC: 52 MG/DL (ref 0–15)
PROT/CREAT UR: 0.13 MG/G{CREAT} (ref 0–0.2)

## 2021-11-05 PROCEDURE — 99214 OFFICE O/P EST MOD 30 MIN: CPT | Mod: HCNC,S$GLB,, | Performed by: INTERNAL MEDICINE

## 2021-11-05 PROCEDURE — 1126F AMNT PAIN NOTED NONE PRSNT: CPT | Mod: HCNC,CPTII,S$GLB, | Performed by: INTERNAL MEDICINE

## 2021-11-05 PROCEDURE — 99214 PR OFFICE/OUTPT VISIT, EST, LEVL IV, 30-39 MIN: ICD-10-PCS | Mod: HCNC,S$GLB,, | Performed by: INTERNAL MEDICINE

## 2021-11-05 PROCEDURE — 82570 ASSAY OF URINE CREATININE: CPT | Mod: HCNC | Performed by: INTERNAL MEDICINE

## 2021-11-05 PROCEDURE — 1101F PT FALLS ASSESS-DOCD LE1/YR: CPT | Mod: HCNC,CPTII,S$GLB, | Performed by: INTERNAL MEDICINE

## 2021-11-05 PROCEDURE — 1160F PR REVIEW ALL MEDS BY PRESCRIBER/CLIN PHARMACIST DOCUMENTED: ICD-10-PCS | Mod: HCNC,CPTII,S$GLB, | Performed by: INTERNAL MEDICINE

## 2021-11-05 PROCEDURE — 99999 PR PBB SHADOW E&M-EST. PATIENT-LVL IV: ICD-10-PCS | Mod: PBBFAC,HCNC,, | Performed by: INTERNAL MEDICINE

## 2021-11-05 PROCEDURE — 3074F PR MOST RECENT SYSTOLIC BLOOD PRESSURE < 130 MM HG: ICD-10-PCS | Mod: HCNC,CPTII,S$GLB, | Performed by: INTERNAL MEDICINE

## 2021-11-05 PROCEDURE — 1159F MED LIST DOCD IN RCRD: CPT | Mod: HCNC,CPTII,S$GLB, | Performed by: INTERNAL MEDICINE

## 2021-11-05 PROCEDURE — 3074F SYST BP LT 130 MM HG: CPT | Mod: HCNC,CPTII,S$GLB, | Performed by: INTERNAL MEDICINE

## 2021-11-05 PROCEDURE — 1126F PR PAIN SEVERITY QUANTIFIED, NO PAIN PRESENT: ICD-10-PCS | Mod: HCNC,CPTII,S$GLB, | Performed by: INTERNAL MEDICINE

## 2021-11-05 PROCEDURE — 1159F PR MEDICATION LIST DOCUMENTED IN MEDICAL RECORD: ICD-10-PCS | Mod: HCNC,CPTII,S$GLB, | Performed by: INTERNAL MEDICINE

## 2021-11-05 PROCEDURE — 99499 RISK ADDL DX/OHS AUDIT: ICD-10-PCS | Mod: S$GLB,,, | Performed by: INTERNAL MEDICINE

## 2021-11-05 PROCEDURE — 99499 UNLISTED E&M SERVICE: CPT | Mod: S$GLB,,, | Performed by: INTERNAL MEDICINE

## 2021-11-05 PROCEDURE — 1101F PR PT FALLS ASSESS DOC 0-1 FALLS W/OUT INJ PAST YR: ICD-10-PCS | Mod: HCNC,CPTII,S$GLB, | Performed by: INTERNAL MEDICINE

## 2021-11-05 PROCEDURE — 3078F PR MOST RECENT DIASTOLIC BLOOD PRESSURE < 80 MM HG: ICD-10-PCS | Mod: HCNC,CPTII,S$GLB, | Performed by: INTERNAL MEDICINE

## 2021-11-05 PROCEDURE — 3288F PR FALLS RISK ASSESSMENT DOCUMENTED: ICD-10-PCS | Mod: HCNC,CPTII,S$GLB, | Performed by: INTERNAL MEDICINE

## 2021-11-05 PROCEDURE — 99999 PR PBB SHADOW E&M-EST. PATIENT-LVL IV: CPT | Mod: PBBFAC,HCNC,, | Performed by: INTERNAL MEDICINE

## 2021-11-05 PROCEDURE — 3288F FALL RISK ASSESSMENT DOCD: CPT | Mod: HCNC,CPTII,S$GLB, | Performed by: INTERNAL MEDICINE

## 2021-11-05 PROCEDURE — 3078F DIAST BP <80 MM HG: CPT | Mod: HCNC,CPTII,S$GLB, | Performed by: INTERNAL MEDICINE

## 2021-11-05 PROCEDURE — 1160F RVW MEDS BY RX/DR IN RCRD: CPT | Mod: HCNC,CPTII,S$GLB, | Performed by: INTERNAL MEDICINE

## 2021-11-05 RX ORDER — AMLODIPINE BESYLATE 10 MG/1
10 TABLET ORAL DAILY
Qty: 90 TABLET | Refills: 3 | Status: SHIPPED | OUTPATIENT
Start: 2021-11-05 | End: 2022-11-08 | Stop reason: SDUPTHER

## 2021-11-05 RX ORDER — LOSARTAN POTASSIUM 100 MG/1
100 TABLET ORAL DAILY
Qty: 90 TABLET | Refills: 3 | Status: SHIPPED | OUTPATIENT
Start: 2021-11-05 | End: 2022-11-08 | Stop reason: SDUPTHER

## 2021-11-05 RX ORDER — ATORVASTATIN CALCIUM 10 MG/1
10 TABLET, FILM COATED ORAL DAILY
Qty: 90 TABLET | Refills: 3 | Status: SHIPPED | OUTPATIENT
Start: 2021-11-05 | End: 2022-11-27

## 2021-11-05 RX ORDER — PROPOFOL 10 MG/ML
INJECTION, EMULSION INTRAVENOUS
COMMUNITY
Start: 2021-06-10 | End: 2021-11-05

## 2021-11-07 ENCOUNTER — PATIENT MESSAGE (OUTPATIENT)
Dept: PRIMARY CARE CLINIC | Facility: CLINIC | Age: 81
End: 2021-11-07
Payer: MEDICARE

## 2021-11-09 ENCOUNTER — LAB VISIT (OUTPATIENT)
Dept: LAB | Facility: HOSPITAL | Age: 81
End: 2021-11-09
Attending: INTERNAL MEDICINE
Payer: MEDICARE

## 2021-11-09 DIAGNOSIS — I10 ESSENTIAL HYPERTENSION: ICD-10-CM

## 2021-11-09 DIAGNOSIS — R73.03 PRE-DIABETES: ICD-10-CM

## 2021-11-09 DIAGNOSIS — E78.5 HYPERLIPIDEMIA, UNSPECIFIED HYPERLIPIDEMIA TYPE: ICD-10-CM

## 2021-11-09 LAB
ALBUMIN SERPL BCP-MCNC: 4.1 G/DL (ref 3.5–5.2)
ALP SERPL-CCNC: 67 U/L (ref 55–135)
ALT SERPL W/O P-5'-P-CCNC: 30 U/L (ref 10–44)
ANION GAP SERPL CALC-SCNC: 12 MMOL/L (ref 8–16)
AST SERPL-CCNC: 31 U/L (ref 10–40)
BILIRUB SERPL-MCNC: 0.8 MG/DL (ref 0.1–1)
BUN SERPL-MCNC: 18 MG/DL (ref 8–23)
CALCIUM SERPL-MCNC: 9.6 MG/DL (ref 8.7–10.5)
CHLORIDE SERPL-SCNC: 106 MMOL/L (ref 95–110)
CHOLEST SERPL-MCNC: 129 MG/DL (ref 120–199)
CHOLEST/HDLC SERPL: 4.2 {RATIO} (ref 2–5)
CO2 SERPL-SCNC: 22 MMOL/L (ref 23–29)
CREAT SERPL-MCNC: 1.6 MG/DL (ref 0.5–1.4)
ERYTHROCYTE [DISTWIDTH] IN BLOOD BY AUTOMATED COUNT: 15.9 % (ref 11.5–14.5)
EST. GFR  (AFRICAN AMERICAN): 46 ML/MIN/1.73 M^2
EST. GFR  (NON AFRICAN AMERICAN): 39.8 ML/MIN/1.73 M^2
ESTIMATED AVG GLUCOSE: 128 MG/DL (ref 68–131)
GLUCOSE SERPL-MCNC: 97 MG/DL (ref 70–110)
HBA1C MFR BLD: 6.1 % (ref 4–5.6)
HCT VFR BLD AUTO: 40.3 % (ref 40–54)
HDLC SERPL-MCNC: 31 MG/DL (ref 40–75)
HDLC SERPL: 24 % (ref 20–50)
HGB BLD-MCNC: 12.7 G/DL (ref 14–18)
LDLC SERPL CALC-MCNC: 78.8 MG/DL (ref 63–159)
MCH RBC QN AUTO: 25.9 PG (ref 27–31)
MCHC RBC AUTO-ENTMCNC: 31.5 G/DL (ref 32–36)
MCV RBC AUTO: 82 FL (ref 82–98)
NONHDLC SERPL-MCNC: 98 MG/DL
PLATELET # BLD AUTO: 237 K/UL (ref 150–450)
PMV BLD AUTO: 10.1 FL (ref 9.2–12.9)
POTASSIUM SERPL-SCNC: 4.2 MMOL/L (ref 3.5–5.1)
PROT SERPL-MCNC: 7.9 G/DL (ref 6–8.4)
RBC # BLD AUTO: 4.9 M/UL (ref 4.6–6.2)
SODIUM SERPL-SCNC: 140 MMOL/L (ref 136–145)
TRIGL SERPL-MCNC: 96 MG/DL (ref 30–150)
WBC # BLD AUTO: 7.58 K/UL (ref 3.9–12.7)

## 2021-11-09 PROCEDURE — 83036 HEMOGLOBIN GLYCOSYLATED A1C: CPT | Mod: HCNC | Performed by: INTERNAL MEDICINE

## 2021-11-09 PROCEDURE — 36415 COLL VENOUS BLD VENIPUNCTURE: CPT | Mod: HCNC | Performed by: INTERNAL MEDICINE

## 2021-11-09 PROCEDURE — 80053 COMPREHEN METABOLIC PANEL: CPT | Mod: HCNC | Performed by: INTERNAL MEDICINE

## 2021-11-09 PROCEDURE — 80061 LIPID PANEL: CPT | Mod: HCNC | Performed by: INTERNAL MEDICINE

## 2021-11-09 PROCEDURE — 85027 COMPLETE CBC AUTOMATED: CPT | Mod: HCNC | Performed by: INTERNAL MEDICINE

## 2021-11-10 ENCOUNTER — TELEPHONE (OUTPATIENT)
Dept: PRIMARY CARE CLINIC | Facility: CLINIC | Age: 81
End: 2021-11-10
Payer: MEDICARE

## 2021-11-10 ENCOUNTER — PATIENT MESSAGE (OUTPATIENT)
Dept: PRIMARY CARE CLINIC | Facility: CLINIC | Age: 81
End: 2021-11-10
Payer: MEDICARE

## 2021-11-10 DIAGNOSIS — N18.32 STAGE 3B CHRONIC KIDNEY DISEASE: Primary | ICD-10-CM

## 2022-01-03 ENCOUNTER — PATIENT MESSAGE (OUTPATIENT)
Dept: PRIMARY CARE CLINIC | Facility: CLINIC | Age: 82
End: 2022-01-03
Payer: MEDICARE

## 2022-01-07 ENCOUNTER — IMMUNIZATION (OUTPATIENT)
Dept: INTERNAL MEDICINE | Facility: CLINIC | Age: 82
End: 2022-01-07
Payer: MEDICARE

## 2022-01-07 DIAGNOSIS — Z23 NEED FOR VACCINATION: Primary | ICD-10-CM

## 2022-01-07 PROCEDURE — 0064A COVID-19, MRNA, LNP-S, PF, 100 MCG/0.25 ML DOSE VACCINE (MODERNA BOOSTER): CPT | Mod: HCNC,CV19,PBBFAC | Performed by: INTERNAL MEDICINE

## 2022-01-07 PROCEDURE — 91306 COVID-19, MRNA, LNP-S, PF, 100 MCG/0.25 ML DOSE VACCINE (MODERNA BOOSTER): CPT | Mod: HCNC,PBBFAC | Performed by: INTERNAL MEDICINE

## 2022-03-10 ENCOUNTER — LAB VISIT (OUTPATIENT)
Dept: LAB | Facility: HOSPITAL | Age: 82
End: 2022-03-10
Attending: INTERNAL MEDICINE
Payer: MEDICARE

## 2022-03-10 DIAGNOSIS — N18.32 STAGE 3B CHRONIC KIDNEY DISEASE: ICD-10-CM

## 2022-03-10 LAB
ANION GAP SERPL CALC-SCNC: 12 MMOL/L (ref 8–16)
BUN SERPL-MCNC: 11 MG/DL (ref 8–23)
CALCIUM SERPL-MCNC: 9.6 MG/DL (ref 8.7–10.5)
CHLORIDE SERPL-SCNC: 106 MMOL/L (ref 95–110)
CO2 SERPL-SCNC: 24 MMOL/L (ref 23–29)
CREAT SERPL-MCNC: 1.2 MG/DL (ref 0.5–1.4)
EST. GFR  (AFRICAN AMERICAN): >60 ML/MIN/1.73 M^2
EST. GFR  (NON AFRICAN AMERICAN): 56 ML/MIN/1.73 M^2
GLUCOSE SERPL-MCNC: 98 MG/DL (ref 70–110)
POTASSIUM SERPL-SCNC: 4.6 MMOL/L (ref 3.5–5.1)
SODIUM SERPL-SCNC: 142 MMOL/L (ref 136–145)

## 2022-03-10 PROCEDURE — 80048 BASIC METABOLIC PNL TOTAL CA: CPT | Performed by: INTERNAL MEDICINE

## 2022-03-10 PROCEDURE — 36415 COLL VENOUS BLD VENIPUNCTURE: CPT | Performed by: INTERNAL MEDICINE

## 2022-03-13 ENCOUNTER — PATIENT MESSAGE (OUTPATIENT)
Dept: PRIMARY CARE CLINIC | Facility: CLINIC | Age: 82
End: 2022-03-13
Payer: MEDICARE

## 2022-09-13 ENCOUNTER — PATIENT MESSAGE (OUTPATIENT)
Dept: PRIMARY CARE CLINIC | Facility: CLINIC | Age: 82
End: 2022-09-13
Payer: MEDICARE

## 2022-11-08 ENCOUNTER — OFFICE VISIT (OUTPATIENT)
Dept: PRIMARY CARE CLINIC | Facility: CLINIC | Age: 82
End: 2022-11-08
Payer: MEDICARE

## 2022-11-08 VITALS
OXYGEN SATURATION: 98 % | TEMPERATURE: 99 F | WEIGHT: 175.69 LBS | HEART RATE: 58 BPM | BODY MASS INDEX: 29.99 KG/M2 | SYSTOLIC BLOOD PRESSURE: 130 MMHG | DIASTOLIC BLOOD PRESSURE: 82 MMHG | HEIGHT: 64 IN

## 2022-11-08 DIAGNOSIS — I10 ESSENTIAL HYPERTENSION: Primary | ICD-10-CM

## 2022-11-08 DIAGNOSIS — K51.40 PSEUDOPOLYP OF DESCENDING COLON WITHOUT COMPLICATION: ICD-10-CM

## 2022-11-08 DIAGNOSIS — Z23 INFLUENZA VACCINE NEEDED: ICD-10-CM

## 2022-11-08 DIAGNOSIS — E78.5 HYPERLIPIDEMIA, UNSPECIFIED HYPERLIPIDEMIA TYPE: ICD-10-CM

## 2022-11-08 DIAGNOSIS — D12.6 TUBULOVILLOUS ADENOMA OF COLON: ICD-10-CM

## 2022-11-08 DIAGNOSIS — Z12.11 COLON CANCER SCREENING: ICD-10-CM

## 2022-11-08 DIAGNOSIS — N40.1 BPH WITH URINARY OBSTRUCTION: ICD-10-CM

## 2022-11-08 DIAGNOSIS — Z23 NEED FOR COVID-19 VACCINE: ICD-10-CM

## 2022-11-08 DIAGNOSIS — N18.31 STAGE 3A CHRONIC KIDNEY DISEASE: ICD-10-CM

## 2022-11-08 DIAGNOSIS — N13.8 BPH WITH URINARY OBSTRUCTION: ICD-10-CM

## 2022-11-08 DIAGNOSIS — R25.2 NOCTURNAL MUSCLE CRAMPS: ICD-10-CM

## 2022-11-08 DIAGNOSIS — I70.0 ATHEROSCLEROSIS OF ABDOMINAL AORTA: ICD-10-CM

## 2022-11-08 DIAGNOSIS — R73.03 PRE-DIABETES: ICD-10-CM

## 2022-11-08 PROCEDURE — 3079F PR MOST RECENT DIASTOLIC BLOOD PRESSURE 80-89 MM HG: ICD-10-PCS | Mod: CPTII,S$GLB,, | Performed by: INTERNAL MEDICINE

## 2022-11-08 PROCEDURE — 99999 PR PBB SHADOW E&M-EST. PATIENT-LVL IV: ICD-10-PCS | Mod: PBBFAC,,, | Performed by: INTERNAL MEDICINE

## 2022-11-08 PROCEDURE — 3288F PR FALLS RISK ASSESSMENT DOCUMENTED: ICD-10-PCS | Mod: CPTII,S$GLB,, | Performed by: INTERNAL MEDICINE

## 2022-11-08 PROCEDURE — 1159F MED LIST DOCD IN RCRD: CPT | Mod: CPTII,S$GLB,, | Performed by: INTERNAL MEDICINE

## 2022-11-08 PROCEDURE — 1160F RVW MEDS BY RX/DR IN RCRD: CPT | Mod: CPTII,S$GLB,, | Performed by: INTERNAL MEDICINE

## 2022-11-08 PROCEDURE — 3075F SYST BP GE 130 - 139MM HG: CPT | Mod: CPTII,S$GLB,, | Performed by: INTERNAL MEDICINE

## 2022-11-08 PROCEDURE — 1160F PR REVIEW ALL MEDS BY PRESCRIBER/CLIN PHARMACIST DOCUMENTED: ICD-10-PCS | Mod: CPTII,S$GLB,, | Performed by: INTERNAL MEDICINE

## 2022-11-08 PROCEDURE — 3288F FALL RISK ASSESSMENT DOCD: CPT | Mod: CPTII,S$GLB,, | Performed by: INTERNAL MEDICINE

## 2022-11-08 PROCEDURE — 1101F PT FALLS ASSESS-DOCD LE1/YR: CPT | Mod: CPTII,S$GLB,, | Performed by: INTERNAL MEDICINE

## 2022-11-08 PROCEDURE — 1126F PR PAIN SEVERITY QUANTIFIED, NO PAIN PRESENT: ICD-10-PCS | Mod: CPTII,S$GLB,, | Performed by: INTERNAL MEDICINE

## 2022-11-08 PROCEDURE — 99499 RISK ADDL DX/OHS AUDIT: ICD-10-PCS | Mod: HCNC,S$GLB,, | Performed by: INTERNAL MEDICINE

## 2022-11-08 PROCEDURE — 81001 URINALYSIS AUTO W/SCOPE: CPT | Performed by: INTERNAL MEDICINE

## 2022-11-08 PROCEDURE — 99999 PR PBB SHADOW E&M-EST. PATIENT-LVL IV: CPT | Mod: PBBFAC,,, | Performed by: INTERNAL MEDICINE

## 2022-11-08 PROCEDURE — 82570 ASSAY OF URINE CREATININE: CPT | Performed by: INTERNAL MEDICINE

## 2022-11-08 PROCEDURE — 99214 PR OFFICE/OUTPT VISIT, EST, LEVL IV, 30-39 MIN: ICD-10-PCS | Mod: S$GLB,,, | Performed by: INTERNAL MEDICINE

## 2022-11-08 PROCEDURE — 3075F PR MOST RECENT SYSTOLIC BLOOD PRESS GE 130-139MM HG: ICD-10-PCS | Mod: CPTII,S$GLB,, | Performed by: INTERNAL MEDICINE

## 2022-11-08 PROCEDURE — 1126F AMNT PAIN NOTED NONE PRSNT: CPT | Mod: CPTII,S$GLB,, | Performed by: INTERNAL MEDICINE

## 2022-11-08 PROCEDURE — 99214 OFFICE O/P EST MOD 30 MIN: CPT | Mod: S$GLB,,, | Performed by: INTERNAL MEDICINE

## 2022-11-08 PROCEDURE — 3079F DIAST BP 80-89 MM HG: CPT | Mod: CPTII,S$GLB,, | Performed by: INTERNAL MEDICINE

## 2022-11-08 PROCEDURE — 1159F PR MEDICATION LIST DOCUMENTED IN MEDICAL RECORD: ICD-10-PCS | Mod: CPTII,S$GLB,, | Performed by: INTERNAL MEDICINE

## 2022-11-08 PROCEDURE — 1101F PR PT FALLS ASSESS DOC 0-1 FALLS W/OUT INJ PAST YR: ICD-10-PCS | Mod: CPTII,S$GLB,, | Performed by: INTERNAL MEDICINE

## 2022-11-08 PROCEDURE — 99499 UNLISTED E&M SERVICE: CPT | Mod: HCNC,S$GLB,, | Performed by: INTERNAL MEDICINE

## 2022-11-08 RX ORDER — LOSARTAN POTASSIUM 100 MG/1
100 TABLET ORAL DAILY
Qty: 90 TABLET | Refills: 3 | Status: SHIPPED | OUTPATIENT
Start: 2022-11-08 | End: 2023-12-08

## 2022-11-08 RX ORDER — AMLODIPINE BESYLATE 10 MG/1
10 TABLET ORAL DAILY
Qty: 90 TABLET | Refills: 3 | Status: SHIPPED | OUTPATIENT
Start: 2022-11-08 | End: 2024-01-03

## 2022-11-08 NOTE — PROGRESS NOTES
Subjective:       Patient ID: Taran Hernandes is a 81 y.o. male.    Chief Complaint: Annual Exam    Last seen 1 year ago. Returns for annual physical and f/u chronic medical conditions. Did not have his one year follow up Colonoscopy in  after large tubulovillous adenoma removed . Has spent much of the past year traveling around the world. Compliant with daily meds as prescribed, no home BP reported. Feeling well, eats less, and very active - exercises at the gym 3-4 days per week, mostly resistance training. No cardiovascular or respiratory symptoms limit activity.     PMH:  Hypertension. Stress Echo negative , EF 60%.  Pre-Diabetes. HbA1c 6.1% .  Mild Dyslipidemia. LDL 79 .  CKD stage 3, GFR 50's.  Abdominal Aorta Atherosclerosis on imaging.   Fatty Liver.  GERD.  Colon Polyp, adenomas.   Hematuria, recurrent UTI, BPH.  Mild Lumbar DJD/Disc disease, MRI .   Diffuse Diverticulosis.  Acute Prostatitis with sepsis 2013.  Acute Pancreatitis Aug. '16.    Hearing impairment, eval by ENT in the past.     Colonoscopy  - 2.5cm tubulovillous adenoma, repeat 1 year. Eye exam . Vaccines reviewed.    PSH: Cholecystectomy. Left Ankle Fracture. Bilateral Cataract Extraction. TURP 3/20.    Social: Never smoked, No alcohol. , 6 children. , retired.     FMH: Father  age 76 with HTN, DM, Heart disease. Mother  age 82 complications of C-scope, suspected to have Colon cancer. Half brother had osteosarcoma. Half brother had liver cancer, heavy EtOH. Half brother  of a stroke noncompliant with HTN management.     NKDA. No food allergies.     MEDICATIONS: list reviewed and reconciled. OTC Omeprazole 20mg daily for acid reflux. Tylenol prn, not daily.        Review of Systems   Constitutional:  Negative for activity change, appetite change, chills, fatigue, fever and unexpected weight change.   HENT:  Negative for nasal congestion, ear pain, hearing loss,  "postnasal drip, rhinorrhea, sore throat, trouble swallowing and voice change.    Eyes:  Negative for pain and visual disturbance.   Respiratory:  Negative for apnea, cough, chest tightness, shortness of breath and wheezing.    Cardiovascular:  Negative for chest pain, palpitations, leg swelling and claudication.   Gastrointestinal:  Negative for blood in stool, constipation, diarrhea, nausea and vomiting.        Mild intermittent RUQ abdominal pain x 1 week with no associated GI or  symptoms.    Genitourinary:  Positive for frequency. Negative for difficulty urinating, dysuria, hematuria, scrotal swelling and urgency.   Musculoskeletal:  Negative for arthralgias, gait problem, neck pain and neck stiffness.        Nocturnal muscle cramps in legs. Mild intermittent low back pain, sometimes with radicular symptoms.    Integumentary:  Negative for color change and rash.   Neurological:  Negative for dizziness, seizures, syncope, weakness, numbness and headaches.        Paresthesias in toes both feet, right worse than left, tingling in toes and sensations of ants crawling.   Hematological:  Negative for adenopathy. Does not bruise/bleed easily.   Psychiatric/Behavioral:  Negative for agitation, confusion, dysphoric mood and sleep disturbance. The patient is not nervous/anxious.        Objective:      Vitals:    11/08/22 1320   BP: 130/82   BP Location: Right arm   Patient Position: Sitting   BP Method: Medium (Manual)   Pulse: (!) 58   Temp: 99.1 °F (37.3 °C)   TempSrc: Oral   SpO2: 98%   Weight: 79.7 kg (175 lb 11.3 oz)         From 180.8 a year ago.   Height: 5' 4" (1.626 m)   BMI=30.2  Physical Exam  Constitutional:       General: He is not in acute distress.     Appearance: Normal appearance. He is well-developed. He is not ill-appearing or diaphoretic.      Comments: Well groomed, ambulatory with normal gait, here alone.    HENT:      Head: Normocephalic and atraumatic.      Right Ear: Tympanic membrane, ear canal " and external ear normal.      Left Ear: Tympanic membrane, ear canal and external ear normal.      Nose: Nose normal.      Mouth/Throat:      Mouth: Mucous membranes are moist.   Eyes:      General: No scleral icterus.     Extraocular Movements: Extraocular movements intact.      Conjunctiva/sclera: Conjunctivae normal.      Pupils: Pupils are equal, round, and reactive to light.   Neck:      Thyroid: No thyromegaly.      Vascular: No carotid bruit or JVD.   Cardiovascular:      Rate and Rhythm: Normal rate and regular rhythm.      Pulses: Normal pulses.      Heart sounds: Normal heart sounds. No murmur heard.    No friction rub. No gallop.   Pulmonary:      Effort: Pulmonary effort is normal. No respiratory distress.      Breath sounds: Normal breath sounds. No wheezing, rhonchi or rales.   Abdominal:      General: Bowel sounds are normal. There is no distension.      Palpations: Abdomen is soft. There is no mass.      Tenderness: There is no abdominal tenderness. There is no guarding or rebound.      Hernia: No hernia is present.   Musculoskeletal:         General: No tenderness or deformity. Normal range of motion.      Cervical back: Normal range of motion and neck supple.      Right lower leg: No edema.      Left lower leg: No edema.   Lymphadenopathy:      Cervical: No cervical adenopathy.   Skin:     General: Skin is warm and dry.      Findings: No rash.   Neurological:      General: No focal deficit present.      Mental Status: He is alert and oriented to person, place, and time.      Cranial Nerves: No cranial nerve deficit.      Motor: No weakness, atrophy or abnormal muscle tone.      Coordination: Coordination normal.      Gait: Gait normal.   Psychiatric:         Mood and Affect: Mood and affect normal.         Speech: Speech normal.         Behavior: Behavior normal.         Thought Content: Thought content normal.         Judgment: Judgment normal.       Assessment:       Problem List Items Addressed  This Visit       CKD (chronic kidney disease), stage III    Relevant Orders    Urinalysis    Protein/Creatinine Ratio, Urine    Hyperlipidemia    Relevant Orders    Lipid Panel    BPH with urinary obstruction    Atherosclerosis of abdominal aorta    Pre-diabetes    Relevant Orders    Hemoglobin A1C    Pseudopolyp of descending colon without complication     Other Visit Diagnoses       Essential hypertension    -  Primary    Relevant Medications    amLODIPine (NORVASC) 10 MG tablet    losartan (COZAAR) 100 MG tablet    Other Relevant Orders    CBC Auto Differential    Comprehensive Metabolic Panel    Ambulatory referral/consult to Optometry    Nocturnal muscle cramps        Relevant Orders    Magnesium    Tubulovillous adenoma of colon        Relevant Orders    Ambulatory referral/consult to Endo Procedure     Colon cancer screening        Relevant Orders    Ambulatory referral/consult to Endo Procedure     Need for COVID-19 vaccine        Influenza vaccine needed                  Plan:       Essential hypertension controlled, continue same.  -     CBC Auto Differential; Future; Expected date: 11/08/2022  -     Comprehensive Metabolic Panel; Future; Expected date: 11/08/2022  -     amLODIPine (NORVASC) 10 MG tablet; Take 1 tablet (10 mg total) by mouth once daily.  Dispense: 90 tablet; Refill: 3  -     losartan (COZAAR) 100 MG tablet; Take 1 tablet (100 mg total) by mouth once daily.  Dispense: 90 tablet; Refill: 3  -     Ambulatory referral/consult to Optometry; Future; Expected date: 11/15/2022    Hyperlipidemia, unspecified hyperlipidemia type  -     Lipid Panel; Future; Expected date: 11/08/2022    Pre-diabetes  -     Hemoglobin A1C; Future; Expected date: 11/08/2022    Atherosclerosis of abdominal aorta        -     continue statin therapy.     Nocturnal muscle cramps  -     Magnesium; Future; Expected date: 11/08/2022    Stage 3a chronic kidney disease  -     Urinalysis  -      Protein/Creatinine Ratio, Urine    BPH with urinary obstruction - stable.     Pseudopolyp of descending colon without complication  Tubulovillous adenoma of colon  -     Ambulatory referral/consult to Endo Procedure ; Future; Expected date: 11/09/2022    Colon cancer screening  -     Ambulatory referral/consult to Endo Procedure ; Future; Expected date: 11/09/2022    Need for COVID-19 vaccine - Bivalent COVID booster recommended.     Influenza vaccine needed - Flu shot today.

## 2022-11-09 ENCOUNTER — LAB VISIT (OUTPATIENT)
Dept: LAB | Facility: HOSPITAL | Age: 82
End: 2022-11-09
Attending: INTERNAL MEDICINE
Payer: MEDICARE

## 2022-11-09 ENCOUNTER — PATIENT MESSAGE (OUTPATIENT)
Dept: PRIMARY CARE CLINIC | Facility: CLINIC | Age: 82
End: 2022-11-09
Payer: MEDICARE

## 2022-11-09 DIAGNOSIS — E78.5 HYPERLIPIDEMIA, UNSPECIFIED HYPERLIPIDEMIA TYPE: ICD-10-CM

## 2022-11-09 DIAGNOSIS — I10 ESSENTIAL HYPERTENSION: ICD-10-CM

## 2022-11-09 DIAGNOSIS — N18.31 STAGE 3A CHRONIC KIDNEY DISEASE: Primary | ICD-10-CM

## 2022-11-09 DIAGNOSIS — R25.2 NOCTURNAL MUSCLE CRAMPS: ICD-10-CM

## 2022-11-09 DIAGNOSIS — R73.03 PRE-DIABETES: ICD-10-CM

## 2022-11-09 LAB
ALBUMIN SERPL BCP-MCNC: 4.4 G/DL (ref 3.5–5.2)
ALP SERPL-CCNC: 78 U/L (ref 55–135)
ALT SERPL W/O P-5'-P-CCNC: 47 U/L (ref 10–44)
ANION GAP SERPL CALC-SCNC: 12 MMOL/L (ref 8–16)
AST SERPL-CCNC: 48 U/L (ref 10–40)
BACTERIA #/AREA URNS AUTO: ABNORMAL /HPF
BASOPHILS # BLD AUTO: 0.06 K/UL (ref 0–0.2)
BASOPHILS NFR BLD: 0.8 % (ref 0–1.9)
BILIRUB SERPL-MCNC: 0.8 MG/DL (ref 0.1–1)
BILIRUB UR QL STRIP: ABNORMAL
BUN SERPL-MCNC: 16 MG/DL (ref 8–23)
CALCIUM SERPL-MCNC: 10 MG/DL (ref 8.7–10.5)
CHLORIDE SERPL-SCNC: 106 MMOL/L (ref 95–110)
CHOLEST SERPL-MCNC: 140 MG/DL (ref 120–199)
CHOLEST/HDLC SERPL: 3.6 {RATIO} (ref 2–5)
CLARITY UR REFRACT.AUTO: ABNORMAL
CO2 SERPL-SCNC: 23 MMOL/L (ref 23–29)
COLOR UR AUTO: YELLOW
CREAT SERPL-MCNC: 1.4 MG/DL (ref 0.5–1.4)
CREAT UR-MCNC: >450 MG/DL (ref 23–375)
DIFFERENTIAL METHOD: ABNORMAL
EOSINOPHIL # BLD AUTO: 0.3 K/UL (ref 0–0.5)
EOSINOPHIL NFR BLD: 3.7 % (ref 0–8)
ERYTHROCYTE [DISTWIDTH] IN BLOOD BY AUTOMATED COUNT: 14.8 % (ref 11.5–14.5)
EST. GFR  (NO RACE VARIABLE): 50.2 ML/MIN/1.73 M^2
ESTIMATED AVG GLUCOSE: 126 MG/DL (ref 68–131)
GLUCOSE SERPL-MCNC: 100 MG/DL (ref 70–110)
GLUCOSE UR QL STRIP: NEGATIVE
HBA1C MFR BLD: 6 % (ref 4–5.6)
HCT VFR BLD AUTO: 45.3 % (ref 40–54)
HDLC SERPL-MCNC: 39 MG/DL (ref 40–75)
HDLC SERPL: 27.9 % (ref 20–50)
HGB BLD-MCNC: 14.5 G/DL (ref 14–18)
HGB UR QL STRIP: NEGATIVE
HYALINE CASTS UR QL AUTO: 405 /LPF
IMM GRANULOCYTES # BLD AUTO: 0.04 K/UL (ref 0–0.04)
IMM GRANULOCYTES NFR BLD AUTO: 0.5 % (ref 0–0.5)
KETONES UR QL STRIP: ABNORMAL
LDLC SERPL CALC-MCNC: 84 MG/DL (ref 63–159)
LEUKOCYTE ESTERASE UR QL STRIP: ABNORMAL
LYMPHOCYTES # BLD AUTO: 2.7 K/UL (ref 1–4.8)
LYMPHOCYTES NFR BLD: 35.6 % (ref 18–48)
MAGNESIUM SERPL-MCNC: 2.1 MG/DL (ref 1.6–2.6)
MCH RBC QN AUTO: 27.7 PG (ref 27–31)
MCHC RBC AUTO-ENTMCNC: 32 G/DL (ref 32–36)
MCV RBC AUTO: 87 FL (ref 82–98)
MICROSCOPIC COMMENT: ABNORMAL
MONOCYTES # BLD AUTO: 0.7 K/UL (ref 0.3–1)
MONOCYTES NFR BLD: 9.1 % (ref 4–15)
NEUTROPHILS # BLD AUTO: 3.8 K/UL (ref 1.8–7.7)
NEUTROPHILS NFR BLD: 50.3 % (ref 38–73)
NITRITE UR QL STRIP: NEGATIVE
NONHDLC SERPL-MCNC: 101 MG/DL
NRBC BLD-RTO: 0 /100 WBC
PH UR STRIP: 6 [PH] (ref 5–8)
PLATELET # BLD AUTO: 246 K/UL (ref 150–450)
PMV BLD AUTO: 11.1 FL (ref 9.2–12.9)
POTASSIUM SERPL-SCNC: 3.9 MMOL/L (ref 3.5–5.1)
PROT SERPL-MCNC: 8.5 G/DL (ref 6–8.4)
PROT UR QL STRIP: ABNORMAL
PROT UR-MCNC: 86 MG/DL (ref 0–15)
PROT/CREAT UR: ABNORMAL MG/G{CREAT} (ref 0–0.2)
RBC # BLD AUTO: 5.24 M/UL (ref 4.6–6.2)
RBC #/AREA URNS AUTO: 8 /HPF (ref 0–4)
SODIUM SERPL-SCNC: 141 MMOL/L (ref 136–145)
SP GR UR STRIP: >1.03 (ref 1–1.03)
SQUAMOUS #/AREA URNS AUTO: 2 /HPF
TRIGL SERPL-MCNC: 85 MG/DL (ref 30–150)
URN SPEC COLLECT METH UR: ABNORMAL
WBC # BLD AUTO: 7.58 K/UL (ref 3.9–12.7)
WBC #/AREA URNS AUTO: 5 /HPF (ref 0–5)

## 2022-11-09 PROCEDURE — 36415 COLL VENOUS BLD VENIPUNCTURE: CPT | Performed by: INTERNAL MEDICINE

## 2022-11-09 PROCEDURE — 85025 COMPLETE CBC W/AUTO DIFF WBC: CPT | Performed by: INTERNAL MEDICINE

## 2022-11-09 PROCEDURE — 80061 LIPID PANEL: CPT | Performed by: INTERNAL MEDICINE

## 2022-11-09 PROCEDURE — 83735 ASSAY OF MAGNESIUM: CPT | Performed by: INTERNAL MEDICINE

## 2022-11-09 PROCEDURE — 83036 HEMOGLOBIN GLYCOSYLATED A1C: CPT | Performed by: INTERNAL MEDICINE

## 2022-11-09 PROCEDURE — 80053 COMPREHEN METABOLIC PANEL: CPT | Performed by: INTERNAL MEDICINE

## 2023-02-09 DIAGNOSIS — Z00.00 ENCOUNTER FOR MEDICARE ANNUAL WELLNESS EXAM: ICD-10-CM

## 2023-03-03 ENCOUNTER — OFFICE VISIT (OUTPATIENT)
Dept: OPTOMETRY | Facility: CLINIC | Age: 83
End: 2023-03-03
Payer: MEDICARE

## 2023-03-03 ENCOUNTER — LAB VISIT (OUTPATIENT)
Dept: LAB | Facility: HOSPITAL | Age: 83
End: 2023-03-03
Attending: INTERNAL MEDICINE
Payer: MEDICARE

## 2023-03-03 DIAGNOSIS — H52.4 PRESBYOPIA: ICD-10-CM

## 2023-03-03 DIAGNOSIS — Z96.1 PSEUDOPHAKIA OF BOTH EYES: ICD-10-CM

## 2023-03-03 DIAGNOSIS — H35.033 HYPERTENSIVE RETINOPATHY OF BOTH EYES: Primary | ICD-10-CM

## 2023-03-03 DIAGNOSIS — H04.123 DRY EYE SYNDROME OF BOTH EYES: ICD-10-CM

## 2023-03-03 DIAGNOSIS — N18.31 STAGE 3A CHRONIC KIDNEY DISEASE: ICD-10-CM

## 2023-03-03 DIAGNOSIS — R73.03 PRE-DIABETES: ICD-10-CM

## 2023-03-03 DIAGNOSIS — I10 ESSENTIAL HYPERTENSION: ICD-10-CM

## 2023-03-03 LAB
ALBUMIN SERPL BCP-MCNC: 4.1 G/DL (ref 3.5–5.2)
ALP SERPL-CCNC: 76 U/L (ref 55–135)
ALT SERPL W/O P-5'-P-CCNC: 51 U/L (ref 10–44)
ANION GAP SERPL CALC-SCNC: 8 MMOL/L (ref 8–16)
AST SERPL-CCNC: 50 U/L (ref 10–40)
BILIRUB SERPL-MCNC: 0.9 MG/DL (ref 0.1–1)
BUN SERPL-MCNC: 12 MG/DL (ref 8–23)
CALCIUM SERPL-MCNC: 9.4 MG/DL (ref 8.7–10.5)
CHLORIDE SERPL-SCNC: 108 MMOL/L (ref 95–110)
CO2 SERPL-SCNC: 25 MMOL/L (ref 23–29)
CREAT SERPL-MCNC: 1.4 MG/DL (ref 0.5–1.4)
EST. GFR  (NO RACE VARIABLE): 49.9 ML/MIN/1.73 M^2
GLUCOSE SERPL-MCNC: 102 MG/DL (ref 70–110)
POTASSIUM SERPL-SCNC: 4.2 MMOL/L (ref 3.5–5.1)
PROT SERPL-MCNC: 8 G/DL (ref 6–8.4)
SODIUM SERPL-SCNC: 141 MMOL/L (ref 136–145)

## 2023-03-03 PROCEDURE — 3288F FALL RISK ASSESSMENT DOCD: CPT | Mod: CPTII,S$GLB,, | Performed by: OPTOMETRIST

## 2023-03-03 PROCEDURE — 36415 COLL VENOUS BLD VENIPUNCTURE: CPT | Mod: HCNC | Performed by: INTERNAL MEDICINE

## 2023-03-03 PROCEDURE — 1101F PR PT FALLS ASSESS DOC 0-1 FALLS W/OUT INJ PAST YR: ICD-10-PCS | Mod: CPTII,S$GLB,, | Performed by: OPTOMETRIST

## 2023-03-03 PROCEDURE — 2023F PR DILATED RETINAL EXAM W/O EVID OF RETINOPATHY: ICD-10-PCS | Mod: CPTII,S$GLB,, | Performed by: OPTOMETRIST

## 2023-03-03 PROCEDURE — 3288F PR FALLS RISK ASSESSMENT DOCUMENTED: ICD-10-PCS | Mod: CPTII,S$GLB,, | Performed by: OPTOMETRIST

## 2023-03-03 PROCEDURE — 99999 PR PBB SHADOW E&M-EST. PATIENT-LVL III: ICD-10-PCS | Mod: PBBFAC,,, | Performed by: OPTOMETRIST

## 2023-03-03 PROCEDURE — 92014 PR EYE EXAM, EST PATIENT,COMPREHESV: ICD-10-PCS | Mod: S$GLB,,, | Performed by: OPTOMETRIST

## 2023-03-03 PROCEDURE — 1101F PT FALLS ASSESS-DOCD LE1/YR: CPT | Mod: CPTII,S$GLB,, | Performed by: OPTOMETRIST

## 2023-03-03 PROCEDURE — 1159F PR MEDICATION LIST DOCUMENTED IN MEDICAL RECORD: ICD-10-PCS | Mod: CPTII,S$GLB,, | Performed by: OPTOMETRIST

## 2023-03-03 PROCEDURE — 2023F DILAT RTA XM W/O RTNOPTHY: CPT | Mod: CPTII,S$GLB,, | Performed by: OPTOMETRIST

## 2023-03-03 PROCEDURE — 92014 COMPRE OPH EXAM EST PT 1/>: CPT | Mod: S$GLB,,, | Performed by: OPTOMETRIST

## 2023-03-03 PROCEDURE — 1159F MED LIST DOCD IN RCRD: CPT | Mod: CPTII,S$GLB,, | Performed by: OPTOMETRIST

## 2023-03-03 PROCEDURE — 80053 COMPREHEN METABOLIC PANEL: CPT | Mod: HCNC | Performed by: INTERNAL MEDICINE

## 2023-03-03 PROCEDURE — 99999 PR PBB SHADOW E&M-EST. PATIENT-LVL III: CPT | Mod: PBBFAC,,, | Performed by: OPTOMETRIST

## 2023-03-03 PROCEDURE — 1126F PR PAIN SEVERITY QUANTIFIED, NO PAIN PRESENT: ICD-10-PCS | Mod: CPTII,S$GLB,, | Performed by: OPTOMETRIST

## 2023-03-03 PROCEDURE — 1126F AMNT PAIN NOTED NONE PRSNT: CPT | Mod: CPTII,S$GLB,, | Performed by: OPTOMETRIST

## 2023-03-03 NOTE — PROGRESS NOTES
HPI    CC: Pt here for routine eye exam.     ZOHRA: 11/2/2020 Dr. King    (-) Changes in vision   (-) Pain  (-) Irritation   (-) Itching   (-) Flashes  (-) Floaters  (-) Glasses wearer: Pt has readers just incase he needs them at his   computer but he rarely uses them  (-) CL wearer  (+) Uses eye gtts: [employer] OU PRN    Does patient want a refraction today? No    (-) Eye injury  (+) Eye surgery: Cataract removal OU and Lasik OU  (-)POHx  (-)FOHx    (+)DM, Pre-DM  Hemoglobin A1C       Date                     Value               Ref Range             Status                11/09/2022               6.0 (H)             4.0 - 5.6 %           Final                  11/09/2021               6.1 (H)             4.0 - 5.6 %           Final                  03/26/2021               5.9 (H)             4.0 - 5.6 %           Final                 Last edited by Becka King, OD on 3/3/2023  4:29 PM.            Assessment /Plan     For exam results, see Encounter Report.    Hypertensive retinopathy of both eyes    Essential hypertension  -     Ambulatory referral/consult to Optometry    Pre-diabetes    Pseudophakia of both eyes    Dry eye syndrome of both eyes    Presbyopia      1-2. Vessel changes, OU. Stable since ZOHRA (11/2020). Discussed importance of BP control, taking medications as directed, and following-up with primary care. If changes in vision noted, RTC. Monitor yearly unless changes noted sooner.      3. No retinopathy noted, OU. Continue proper BS control and annual diabetic eye exams. Monitor yearly.      4. PCIOL clear and centered OU. Monitor yearly.      5. Educated pt on findings. Recommended ATs TID-QID for added lubrication and comfort. If symptoms worsen or dont improve, RTC. Monitor.      6. Continue use of OTC reading glasses prn. Monitor yearly.        RTC in 1 year for annual eye exam or sooner if needed.

## 2023-04-18 ENCOUNTER — HOSPITAL ENCOUNTER (OUTPATIENT)
Dept: RADIOLOGY | Facility: HOSPITAL | Age: 83
Discharge: HOME | End: 2023-04-18
Attending: INTERNAL MEDICINE
Payer: MEDICARE

## 2023-04-18 DIAGNOSIS — K76.89 LIVER CYST: ICD-10-CM

## 2023-04-18 DIAGNOSIS — K76.0 NAFL (NONALCOHOLIC FATTY LIVER): ICD-10-CM

## 2023-04-18 PROCEDURE — 76705 ECHO EXAM OF ABDOMEN: CPT | Mod: TC,HCNC

## 2023-04-18 PROCEDURE — 76705 US ABDOMEN LIMITED: ICD-10-PCS | Mod: 26,HCNC,, | Performed by: RADIOLOGY

## 2023-04-18 PROCEDURE — 76705 ECHO EXAM OF ABDOMEN: CPT | Mod: 26,HCNC,, | Performed by: RADIOLOGY

## 2023-04-20 ENCOUNTER — PATIENT MESSAGE (OUTPATIENT)
Dept: PRIMARY CARE CLINIC | Facility: CLINIC | Age: 83
End: 2023-04-20
Payer: MEDICARE

## 2023-05-03 ENCOUNTER — PATIENT MESSAGE (OUTPATIENT)
Dept: PRIMARY CARE CLINIC | Facility: CLINIC | Age: 83
End: 2023-05-03
Payer: MEDICARE

## 2023-05-03 DIAGNOSIS — K76.9 LIVER DISEASE, UNSPECIFIED: Primary | ICD-10-CM

## 2023-05-03 DIAGNOSIS — K76.89 LIVER CYST: ICD-10-CM

## 2023-05-17 ENCOUNTER — LAB VISIT (OUTPATIENT)
Dept: LAB | Facility: HOSPITAL | Age: 83
End: 2023-05-17
Attending: INTERNAL MEDICINE
Payer: MEDICARE

## 2023-05-17 ENCOUNTER — TELEPHONE (OUTPATIENT)
Dept: PRIMARY CARE CLINIC | Facility: CLINIC | Age: 83
End: 2023-05-17
Payer: MEDICARE

## 2023-05-17 DIAGNOSIS — K76.89 LIVER CYST: ICD-10-CM

## 2023-05-17 LAB
CREAT SERPL-MCNC: 1.3 MG/DL (ref 0.5–1.4)
EST. GFR  (NO RACE VARIABLE): 54.5 ML/MIN/1.73 M^2

## 2023-05-17 PROCEDURE — 82565 ASSAY OF CREATININE: CPT | Performed by: INTERNAL MEDICINE

## 2023-05-17 PROCEDURE — 36415 COLL VENOUS BLD VENIPUNCTURE: CPT | Performed by: INTERNAL MEDICINE

## 2023-05-17 NOTE — TELEPHONE ENCOUNTER
----- Message from Leida Huff sent at 5/17/2023 11:13 AM CDT -----  Regarding: lab  Good Morning,    Patient dropped off urine sample, Please provide orders for the sample.

## 2023-05-18 ENCOUNTER — PATIENT MESSAGE (OUTPATIENT)
Dept: PRIMARY CARE CLINIC | Facility: CLINIC | Age: 83
End: 2023-05-18
Payer: MEDICARE

## 2023-05-18 ENCOUNTER — HOSPITAL ENCOUNTER (OUTPATIENT)
Dept: RADIOLOGY | Facility: HOSPITAL | Age: 83
Discharge: HOME | End: 2023-05-18
Attending: INTERNAL MEDICINE
Payer: MEDICARE

## 2023-05-18 DIAGNOSIS — K76.9 LIVER DISEASE, UNSPECIFIED: ICD-10-CM

## 2023-05-18 DIAGNOSIS — K76.89 LIVER CYST: ICD-10-CM

## 2023-05-18 PROCEDURE — 25500020 PHARM REV CODE 255: Performed by: INTERNAL MEDICINE

## 2023-05-18 PROCEDURE — 74183 MRI ABDOMEN W WO CONTRAST: ICD-10-PCS | Mod: 26,,, | Performed by: RADIOLOGY

## 2023-05-18 PROCEDURE — 74183 MRI ABD W/O CNTR FLWD CNTR: CPT | Mod: 26,,, | Performed by: RADIOLOGY

## 2023-05-18 PROCEDURE — 74183 MRI ABD W/O CNTR FLWD CNTR: CPT | Mod: TC

## 2023-05-18 PROCEDURE — A9585 GADOBUTROL INJECTION: HCPCS | Performed by: INTERNAL MEDICINE

## 2023-05-18 RX ORDER — GADOBUTROL 604.72 MG/ML
10 INJECTION INTRAVENOUS
Status: COMPLETED | OUTPATIENT
Start: 2023-05-18 | End: 2023-05-18

## 2023-05-18 RX ADMIN — GADOBUTROL 10 ML: 604.72 INJECTION INTRAVENOUS at 08:05

## 2023-06-23 ENCOUNTER — OFFICE VISIT (OUTPATIENT)
Dept: INTERNAL MEDICINE | Facility: CLINIC | Age: 83
End: 2023-06-23
Payer: MEDICARE

## 2023-06-23 VITALS
HEIGHT: 64 IN | HEART RATE: 53 BPM | BODY MASS INDEX: 29.47 KG/M2 | WEIGHT: 172.63 LBS | OXYGEN SATURATION: 97 % | SYSTOLIC BLOOD PRESSURE: 145 MMHG | DIASTOLIC BLOOD PRESSURE: 80 MMHG

## 2023-06-23 DIAGNOSIS — M46.00 SPINAL ENTHESOPATHY: ICD-10-CM

## 2023-06-23 DIAGNOSIS — K76.89 HEPATIC CYST: ICD-10-CM

## 2023-06-23 DIAGNOSIS — K21.9 GASTROESOPHAGEAL REFLUX DISEASE, UNSPECIFIED WHETHER ESOPHAGITIS PRESENT: ICD-10-CM

## 2023-06-23 DIAGNOSIS — K51.40 PSEUDOPOLYP OF DESCENDING COLON WITHOUT COMPLICATION: ICD-10-CM

## 2023-06-23 DIAGNOSIS — R26.9 ABNORMALITY OF GAIT AND MOBILITY: ICD-10-CM

## 2023-06-23 DIAGNOSIS — N18.31 STAGE 3A CHRONIC KIDNEY DISEASE: ICD-10-CM

## 2023-06-23 DIAGNOSIS — K76.0 FATTY LIVER: ICD-10-CM

## 2023-06-23 DIAGNOSIS — I70.0 ATHEROSCLEROSIS OF ABDOMINAL AORTA: ICD-10-CM

## 2023-06-23 DIAGNOSIS — M51.36 DDD (DEGENERATIVE DISC DISEASE), LUMBAR: ICD-10-CM

## 2023-06-23 DIAGNOSIS — N13.8 BPH WITH URINARY OBSTRUCTION: ICD-10-CM

## 2023-06-23 DIAGNOSIS — R73.03 PRE-DIABETES: ICD-10-CM

## 2023-06-23 DIAGNOSIS — N40.1 BPH WITH URINARY OBSTRUCTION: ICD-10-CM

## 2023-06-23 DIAGNOSIS — Z00.00 ENCOUNTER FOR PREVENTIVE HEALTH EXAMINATION: Primary | ICD-10-CM

## 2023-06-23 DIAGNOSIS — Z00.00 ENCOUNTER FOR MEDICARE ANNUAL WELLNESS EXAM: ICD-10-CM

## 2023-06-23 DIAGNOSIS — I10 PRIMARY HYPERTENSION: ICD-10-CM

## 2023-06-23 DIAGNOSIS — E78.5 HYPERLIPIDEMIA, UNSPECIFIED HYPERLIPIDEMIA TYPE: ICD-10-CM

## 2023-06-23 PROCEDURE — 1101F PT FALLS ASSESS-DOCD LE1/YR: CPT | Mod: CPTII,S$GLB,, | Performed by: NURSE PRACTITIONER

## 2023-06-23 PROCEDURE — G0439 PPPS, SUBSEQ VISIT: HCPCS | Mod: S$GLB,,, | Performed by: NURSE PRACTITIONER

## 2023-06-23 PROCEDURE — 1159F PR MEDICATION LIST DOCUMENTED IN MEDICAL RECORD: ICD-10-PCS | Mod: CPTII,S$GLB,, | Performed by: NURSE PRACTITIONER

## 2023-06-23 PROCEDURE — 3077F PR MOST RECENT SYSTOLIC BLOOD PRESSURE >= 140 MM HG: ICD-10-PCS | Mod: CPTII,S$GLB,, | Performed by: NURSE PRACTITIONER

## 2023-06-23 PROCEDURE — 1160F RVW MEDS BY RX/DR IN RCRD: CPT | Mod: CPTII,S$GLB,, | Performed by: NURSE PRACTITIONER

## 2023-06-23 PROCEDURE — 1160F PR REVIEW ALL MEDS BY PRESCRIBER/CLIN PHARMACIST DOCUMENTED: ICD-10-PCS | Mod: CPTII,S$GLB,, | Performed by: NURSE PRACTITIONER

## 2023-06-23 PROCEDURE — 1125F AMNT PAIN NOTED PAIN PRSNT: CPT | Mod: CPTII,S$GLB,, | Performed by: NURSE PRACTITIONER

## 2023-06-23 PROCEDURE — 3077F SYST BP >= 140 MM HG: CPT | Mod: CPTII,S$GLB,, | Performed by: NURSE PRACTITIONER

## 2023-06-23 PROCEDURE — 99499 UNLISTED E&M SERVICE: CPT | Mod: HCNC,S$GLB,, | Performed by: NURSE PRACTITIONER

## 2023-06-23 PROCEDURE — 99999 PR PBB SHADOW E&M-EST. PATIENT-LVL V: ICD-10-PCS | Mod: PBBFAC,,, | Performed by: NURSE PRACTITIONER

## 2023-06-23 PROCEDURE — 3288F PR FALLS RISK ASSESSMENT DOCUMENTED: ICD-10-PCS | Mod: CPTII,S$GLB,, | Performed by: NURSE PRACTITIONER

## 2023-06-23 PROCEDURE — 1170F FXNL STATUS ASSESSED: CPT | Mod: CPTII,S$GLB,, | Performed by: NURSE PRACTITIONER

## 2023-06-23 PROCEDURE — 3288F FALL RISK ASSESSMENT DOCD: CPT | Mod: CPTII,S$GLB,, | Performed by: NURSE PRACTITIONER

## 2023-06-23 PROCEDURE — G0439 PR MEDICARE ANNUAL WELLNESS SUBSEQUENT VISIT: ICD-10-PCS | Mod: S$GLB,,, | Performed by: NURSE PRACTITIONER

## 2023-06-23 PROCEDURE — 1159F MED LIST DOCD IN RCRD: CPT | Mod: CPTII,S$GLB,, | Performed by: NURSE PRACTITIONER

## 2023-06-23 PROCEDURE — 99499 RISK ADDL DX/OHS AUDIT: ICD-10-PCS | Mod: HCNC,S$GLB,, | Performed by: NURSE PRACTITIONER

## 2023-06-23 PROCEDURE — 1101F PR PT FALLS ASSESS DOC 0-1 FALLS W/OUT INJ PAST YR: ICD-10-PCS | Mod: CPTII,S$GLB,, | Performed by: NURSE PRACTITIONER

## 2023-06-23 PROCEDURE — 1170F PR FUNCTIONAL STATUS ASSESSED: ICD-10-PCS | Mod: CPTII,S$GLB,, | Performed by: NURSE PRACTITIONER

## 2023-06-23 PROCEDURE — 1125F PR PAIN SEVERITY QUANTIFIED, PAIN PRESENT: ICD-10-PCS | Mod: CPTII,S$GLB,, | Performed by: NURSE PRACTITIONER

## 2023-06-23 PROCEDURE — 99999 PR PBB SHADOW E&M-EST. PATIENT-LVL V: CPT | Mod: PBBFAC,,, | Performed by: NURSE PRACTITIONER

## 2023-06-23 PROCEDURE — 3079F DIAST BP 80-89 MM HG: CPT | Mod: CPTII,S$GLB,, | Performed by: NURSE PRACTITIONER

## 2023-06-23 PROCEDURE — 3079F PR MOST RECENT DIASTOLIC BLOOD PRESSURE 80-89 MM HG: ICD-10-PCS | Mod: CPTII,S$GLB,, | Performed by: NURSE PRACTITIONER

## 2023-06-23 NOTE — PATIENT INSTRUCTIONS
1. Schedule follow up with Dr. Rosita Rios MD around Nov 2023.    2. Covid bivalent booster available if interested.    3. Call to schedule colonoscopy on June 29. I did let them know you cannot hear very well over the phone.    4. Take blood pressure as usual and keep a log. If consistently over 130/80 let Dr. Rosita Rios MD know what times of day it is running higher.    Counseling and Referral of Other Preventative  (Italic type indicates deductible and co-insurance are waived)    Patient Name: Taran Hernandes  Today's Date: 6/23/2023    Health Maintenance         Date Due Completion Date    COVID-19 Vaccine (4 - Moderna series) 03/04/2022 1/7/2022    Colonoscopy 06/10/2022 6/10/2021    Hemoglobin A1c (Prediabetes) 11/09/2023 11/9/2022    Lipid Panel 11/09/2027 11/9/2022    TETANUS VACCINE 03/27/2029 3/27/2019          No orders of the defined types were placed in this encounter.      The following information is provided to all patients.  This information is to help you find resources for any of the problems found today that may be affecting your health:                Living healthy guide: www.[facility].[place].gov      Understanding Diabetes: www.diabetes.org      Eating healthy: www.cdc.gov/healthyweight      CDC home safety checklist: www.cdc.gov/steadi/patient.html      Agency on Aging: www.goea.[place].[facility]      Alcoholics anonymous (AA): www.aa.org      Physical Activity: www.christina.nih.gov/pk2fizc      Tobacco use: www.quitwithusla.org

## 2023-06-23 NOTE — PROGRESS NOTES
"Taran Hernandes presented for a  Medicare AWV and comprehensive Health Risk Assessment today. The following components were reviewed and updated:    Medical history  Family History  Social history  Allergies and Current Medications  Health Risk Assessment  Health Maintenance  Care Team         ** See Completed Assessments for Annual Wellness Visit within the encounter summary.**         The following assessments were completed:  Living Situation  CAGE  Depression Screening  Timed Get Up and Go  Whisper Test - N/A hearing impairment  Cognitive Function Screening - Recalled 1/3 words at 3 min, clock drawing normal. No memory issues. Will defer further eval at this time.    Nutrition Screening  ADL Screening  PAQ Screening  Review for Opioid Screening: Pt does not have Rx for Opioids.  Review for Substance Use Disorders: Patient does not use substances.        Vitals:    06/23/23 0804 06/23/23 0902   BP: (!) 144/78 (!) 145/80   BP Location: Left arm Left arm   Patient Position:  Sitting   BP Method:  Large (Manual)   Pulse: (!) 53    SpO2: 97%    Weight: 78.3 kg (172 lb 9.9 oz)    Height: 5' 4" (1.626 m)      Body mass index is 29.63 kg/m².    Physical Exam  Vitals reviewed.   Constitutional:       Appearance: Normal appearance.   HENT:      Head: Normocephalic.   Cardiovascular:      Rate and Rhythm: Normal rate.   Pulmonary:      Effort: Pulmonary effort is normal.   Abdominal:      General: Bowel sounds are normal.   Musculoskeletal:         General: Normal range of motion.      Right lower leg: No edema.      Left lower leg: No edema.   Skin:     General: Skin is warm and dry.      Capillary Refill: Capillary refill takes less than 2 seconds.   Neurological:      Mental Status: He is alert and oriented to person, place, and time.   Psychiatric:         Behavior: Behavior normal.         Thought Content: Thought content normal.         Judgment: Judgment normal.             Diagnoses and health risks identified " today and associated recommendations/orders:    1. Encounter for preventive health examination  Assessments completed.   recommendations reviewed. Discussed covid bivalent booster.  F/u with PCP as instructed.    2. Stage 3a chronic kidney disease  Chronic, stable on current regimen. Followed by PCP.    3. Atherosclerosis of abdominal aorta  Chronic, stable on current regimen. Followed by PCP. On statin.    4. Spinal enthesopathy  Chronic, stable on current regimen. Followed by PCP.    5. 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 Followed by GI. Endo scheduling call on 6/29/23 for 1yr f/u colonoscopy.    6. Primary hypertension  Chronic, stable on current regimen. Followed by PCP. BP elevated today in clinic. Pt reports home bps range from 110s-130s/70s-80s. Denies CP, SOB, HA, vision changes. ED precautions reviewed. Instructed to keep BP log and notify PCP if bps consistently above 130/80s.    7. Hyperlipidemia, unspecified hyperlipidemia type  Chronic, stable on current regimen. Followed by PCP.    8. Pre-diabetes  Chronic, stable on current regimen. Followed by PCP.    9. BMI 29.0-29.9,adult  Chronic, stable on current regimen. Followed by PCP.    10. Fatty liver  Chronic, stable on current regimen. Followed by PCP.    11. Hepatic cyst  Chronic, stable on current regimen. Followed by PCP. US f/u due 11/2023.    12. Gastroesophageal reflux disease, unspecified whether esophagitis present  Chronic, stable on current regimen. Followed by PCP.    13. BPH with urinary obstruction  Chronic, stable on current regimen. Followed by PCP.    14. DDD (degenerative disc disease), lumbar  Chronic, stable on current regimen. Followed by PCP.    15. Abnormality of gait and mobility  Chronic, stable. No recent falls. Does not use assistive devices. Followed by PCP.    16. Encounter for Medicare annual wellness exam  Medicare HRA completed today.  - Ambulatory Referral/Consult to  Enhanced Annual Wellness Visit (eAWV)      Provided Taran with a 5-10 year written screening schedule and personal prevention plan. Recommendations were developed using the USPSTF age appropriate recommendations. Education, counseling, and referrals were provided as needed. After Visit Summary printed and given to patient which includes a list of additional screenings\tests needed.    Follow up in about 1 year (around 6/23/2024) for Medicare AWV and with PCP as instructed.       Kelsey Tinsley NP    I offered to discuss advanced care planning, including how to pick a person who would make decisions for you if you were unable to make them for yourself, called a health care power of , and what kind of decisions you might make such as use of life sustaining treatments such as ventilators and tube feeding when faced with a life limiting illness recorded on a living will that they will need to know. (How you want to be cared for as you near the end of your natural life)     X Patient is interested in learning more about how to make advanced directives.  I provided them paperwork and offered to discuss this with them.

## 2023-06-26 ENCOUNTER — TELEPHONE (OUTPATIENT)
Dept: ENDOSCOPY | Facility: HOSPITAL | Age: 83
End: 2023-06-26
Payer: MEDICARE

## 2023-06-26 NOTE — TELEPHONE ENCOUNTER
Patient said he spoke with someone and allowed them to know that he is hard of hearing and that he will have to schedule procedure in person or he will have to go somewhere else to schedule procedure.

## 2023-06-29 ENCOUNTER — CLINICAL SUPPORT (OUTPATIENT)
Dept: ENDOSCOPY | Facility: HOSPITAL | Age: 83
End: 2023-06-29
Attending: INTERNAL MEDICINE
Payer: MEDICARE

## 2023-06-29 ENCOUNTER — TELEPHONE (OUTPATIENT)
Dept: ENDOSCOPY | Facility: HOSPITAL | Age: 83
End: 2023-06-29

## 2023-06-29 ENCOUNTER — PATIENT MESSAGE (OUTPATIENT)
Dept: ENDOSCOPY | Facility: HOSPITAL | Age: 83
End: 2023-06-29

## 2023-06-29 VITALS — HEIGHT: 64 IN | WEIGHT: 172 LBS | BODY MASS INDEX: 29.37 KG/M2

## 2023-06-29 DIAGNOSIS — D12.6 TUBULOVILLOUS ADENOMA OF COLON: ICD-10-CM

## 2023-06-29 DIAGNOSIS — Z12.11 COLON CANCER SCREENING: Primary | ICD-10-CM

## 2023-06-29 DIAGNOSIS — Z12.11 COLON CANCER SCREENING: ICD-10-CM

## 2023-06-29 NOTE — TELEPHONE ENCOUNTER
This patient had a PAT appointment today but is so hard of hearing he had to come schedule his colonoscopy in person. His referral is from his PCP; however, Dr. Morton previously performed his scope in 2021and recommended him to have another in 1 year. Since the patient is 82 can you reach out to him if he needs a clinic appointment or no longer needs the procedure. Patient said best way to contact is through [facility].

## 2023-06-29 NOTE — TELEPHONE ENCOUNTER
Spoke to pt  to schedule procedure(s) Colonoscopy       Physician to perform procedure(s) Dr. JEROME Morton  Date of Procedure (s) 6/29/23  Arrival Time 9:45 AM  Time of Procedure(s) 10:45 AM   Location of Procedure(s) [facility]  Type of Rx Prep sent to patient: PEG  Instructions provided to patient via [facility]/ handed to pt    Patient was informed on the following information and verbalized understanding. Screening questionnaire reviewed with patient and complete. If procedure requires anesthesia, a responsible adult needs to be present to accompany the patient home, patient cannot drive after receiving anesthesia. Appointment details are tentative, especially check-in time. Patient will receive a prep-op call 4 days prior to confirm check-in time for procedure. If applicable the patient should contact their pharmacy to verify Rx for procedure prep is ready for pick-up. Patient was advised to call the scheduling department at [phone] if pharmacy states no Rx is available. Patient was advised to call the endoscopy scheduling department if any questions or concerns arise.      [facility] Scheduling Department

## 2023-08-24 ENCOUNTER — ANESTHESIA EVENT (OUTPATIENT)
Dept: ENDOSCOPY | Facility: HOSPITAL | Age: 83
End: 2023-08-24
Payer: MEDICARE

## 2023-08-25 ENCOUNTER — ANESTHESIA (OUTPATIENT)
Dept: ENDOSCOPY | Facility: HOSPITAL | Age: 83
End: 2023-08-25
Payer: MEDICARE

## 2023-08-25 ENCOUNTER — HOSPITAL ENCOUNTER (OUTPATIENT)
Facility: HOSPITAL | Age: 83
Discharge: HOME | End: 2023-08-26
Attending: EMERGENCY MEDICINE | Admitting: STUDENT IN AN ORGANIZED HEALTH CARE EDUCATION/TRAINING PROGRAM
Payer: MEDICARE

## 2023-08-25 DIAGNOSIS — G57.61 MORTON'S NEUROMA OF THIRD INTERSPACE OF RIGHT FOOT: ICD-10-CM

## 2023-08-25 DIAGNOSIS — H91.90 HEARING LOSS, UNSPECIFIED HEARING LOSS TYPE, UNSPECIFIED LATERALITY: ICD-10-CM

## 2023-08-25 DIAGNOSIS — R07.9 CHEST PAIN: ICD-10-CM

## 2023-08-25 DIAGNOSIS — H81.10 BENIGN PAROXYSMAL POSITIONAL VERTIGO, UNSPECIFIED LATERALITY: Primary | ICD-10-CM

## 2023-08-25 DIAGNOSIS — R00.1 BRADYCARDIA: ICD-10-CM

## 2023-08-25 LAB
ALBUMIN SERPL BCP-MCNC: 3.6 G/DL (ref 3.5–5.2)
ALP SERPL-CCNC: 68 U/L (ref 55–135)
ALT SERPL W/O P-5'-P-CCNC: 41 U/L (ref 10–44)
ANION GAP SERPL CALC-SCNC: 11 MMOL/L (ref 8–16)
AST SERPL-CCNC: 47 U/L (ref 10–40)
BASOPHILS # BLD AUTO: 0.05 K/UL (ref 0–0.2)
BASOPHILS NFR BLD: 0.8 % (ref 0–1.9)
BILIRUB SERPL-MCNC: 0.7 MG/DL (ref 0.1–1)
BNP SERPL-MCNC: 189 PG/ML (ref 0–99)
BUN SERPL-MCNC: 10 MG/DL (ref 8–23)
CALCIUM SERPL-MCNC: 8.2 MG/DL (ref 8.7–10.5)
CHLORIDE SERPL-SCNC: 109 MMOL/L (ref 95–110)
CO2 SERPL-SCNC: 21 MMOL/L (ref 23–29)
CREAT SERPL-MCNC: 1.4 MG/DL (ref 0.5–1.4)
DIFFERENTIAL METHOD: ABNORMAL
EOSINOPHIL # BLD AUTO: 0.1 K/UL (ref 0–0.5)
EOSINOPHIL NFR BLD: 2.2 % (ref 0–8)
ERYTHROCYTE [DISTWIDTH] IN BLOOD BY AUTOMATED COUNT: 14.4 % (ref 11.5–14.5)
EST. GFR  (NO RACE VARIABLE): 49.9 ML/MIN/1.73 M^2
GLUCOSE SERPL-MCNC: 85 MG/DL (ref 70–110)
HCT VFR BLD AUTO: 41.4 % (ref 40–54)
HGB BLD-MCNC: 13.6 G/DL (ref 14–18)
IMM GRANULOCYTES # BLD AUTO: 0.01 K/UL (ref 0–0.04)
IMM GRANULOCYTES NFR BLD AUTO: 0.2 % (ref 0–0.5)
LYMPHOCYTES # BLD AUTO: 2.7 K/UL (ref 1–4.8)
LYMPHOCYTES NFR BLD: 42.6 % (ref 18–48)
MAGNESIUM SERPL-MCNC: 1.9 MG/DL (ref 1.6–2.6)
MCH RBC QN AUTO: 28 PG (ref 27–31)
MCHC RBC AUTO-ENTMCNC: 32.9 G/DL (ref 32–36)
MCV RBC AUTO: 85 FL (ref 82–98)
MONOCYTES # BLD AUTO: 0.5 K/UL (ref 0.3–1)
MONOCYTES NFR BLD: 8.1 % (ref 4–15)
NEUTROPHILS # BLD AUTO: 2.9 K/UL (ref 1.8–7.7)
NEUTROPHILS NFR BLD: 46.1 % (ref 38–73)
NRBC BLD-RTO: 0 /100 WBC
PHOSPHATE SERPL-MCNC: 3.3 MG/DL (ref 2.7–4.5)
PLATELET # BLD AUTO: 219 K/UL (ref 150–450)
PMV BLD AUTO: 10.4 FL (ref 9.2–12.9)
POTASSIUM SERPL-SCNC: 4.3 MMOL/L (ref 3.5–5.1)
PROT SERPL-MCNC: 6.9 G/DL (ref 6–8.4)
RBC # BLD AUTO: 4.85 M/UL (ref 4.6–6.2)
SODIUM SERPL-SCNC: 141 MMOL/L (ref 136–145)
TROPONIN I SERPL DL<=0.01 NG/ML-MCNC: 0.01 NG/ML (ref 0–0.03)
TSH SERPL DL<=0.005 MIU/L-ACNC: 2.8 UIU/ML (ref 0.4–4)
WBC # BLD AUTO: 6.31 K/UL (ref 3.9–12.7)

## 2023-08-25 PROCEDURE — 83735 ASSAY OF MAGNESIUM: CPT | Mod: HCNC

## 2023-08-25 PROCEDURE — 63600175 PHARM REV CODE 636 W HCPCS: Mod: HCNC | Performed by: NURSE ANESTHETIST, CERTIFIED REGISTERED

## 2023-08-25 PROCEDURE — 99284 EMERGENCY DEPT VISIT MOD MDM: CPT | Mod: HCNC,GC,, | Performed by: INTERNAL MEDICINE

## 2023-08-25 PROCEDURE — 99223 PR INITIAL HOSPITAL CARE,LEVL III: ICD-10-PCS | Mod: HCNC,,, | Performed by: FAMILY MEDICINE

## 2023-08-25 PROCEDURE — 25000003 PHARM REV CODE 250: Mod: HCNC | Performed by: FAMILY MEDICINE

## 2023-08-25 PROCEDURE — 99284 PR EMERGENCY DEPT VISIT,LEVEL IV: ICD-10-PCS | Mod: HCNC,GC,, | Performed by: INTERNAL MEDICINE

## 2023-08-25 PROCEDURE — 84443 ASSAY THYROID STIM HORMONE: CPT | Mod: HCNC

## 2023-08-25 PROCEDURE — 94761 N-INVAS EAR/PLS OXIMETRY MLT: CPT | Mod: HCNC

## 2023-08-25 PROCEDURE — 96372 THER/PROPH/DIAG INJ SC/IM: CPT | Mod: 59 | Performed by: FAMILY MEDICINE

## 2023-08-25 PROCEDURE — 25000003 PHARM REV CODE 250: Mod: HCNC | Performed by: NURSE ANESTHETIST, CERTIFIED REGISTERED

## 2023-08-25 PROCEDURE — 93010 EKG 12-LEAD: ICD-10-PCS | Mod: 76,HCNC,, | Performed by: INTERNAL MEDICINE

## 2023-08-25 PROCEDURE — 93010 ELECTROCARDIOGRAM REPORT: CPT | Mod: HCNC,,, | Performed by: INTERNAL MEDICINE

## 2023-08-25 PROCEDURE — 99285 EMERGENCY DEPT VISIT HI MDM: CPT | Mod: 25,HCNC

## 2023-08-25 PROCEDURE — 84484 ASSAY OF TROPONIN QUANT: CPT | Mod: HCNC

## 2023-08-25 PROCEDURE — 99223 1ST HOSP IP/OBS HIGH 75: CPT | Mod: HCNC,,, | Performed by: FAMILY MEDICINE

## 2023-08-25 PROCEDURE — G0378 HOSPITAL OBSERVATION PER HR: HCPCS | Mod: HCNC

## 2023-08-25 PROCEDURE — E9220 PRA ENDO ANESTHESIA: HCPCS | Mod: PT,HCNC,, | Performed by: NURSE ANESTHETIST, CERTIFIED REGISTERED

## 2023-08-25 PROCEDURE — 84100 ASSAY OF PHOSPHORUS: CPT | Mod: HCNC

## 2023-08-25 PROCEDURE — 93005 ELECTROCARDIOGRAM TRACING: CPT | Mod: HCNC

## 2023-08-25 PROCEDURE — 85025 COMPLETE CBC W/AUTO DIFF WBC: CPT | Mod: HCNC | Performed by: EMERGENCY MEDICINE

## 2023-08-25 PROCEDURE — E9220 PRA ENDO ANESTHESIA: ICD-10-PCS | Mod: PT,HCNC,, | Performed by: NURSE ANESTHETIST, CERTIFIED REGISTERED

## 2023-08-25 PROCEDURE — 63600175 PHARM REV CODE 636 W HCPCS: Mod: HCNC | Performed by: FAMILY MEDICINE

## 2023-08-25 PROCEDURE — 83880 ASSAY OF NATRIURETIC PEPTIDE: CPT | Mod: HCNC

## 2023-08-25 PROCEDURE — 80053 COMPREHEN METABOLIC PANEL: CPT | Mod: HCNC

## 2023-08-25 PROCEDURE — 51702 INSERT TEMP BLADDER CATH: CPT | Mod: HCNC

## 2023-08-25 RX ORDER — IBUPROFEN 200 MG
24 TABLET ORAL
Status: DISCONTINUED | OUTPATIENT
Start: 2023-08-25 | End: 2023-08-26 | Stop reason: HOSPADM

## 2023-08-25 RX ORDER — MAG HYDROX/ALUMINUM HYD/SIMETH 200-200-20
30 SUSPENSION, ORAL (FINAL DOSE FORM) ORAL 4 TIMES DAILY PRN
Status: DISCONTINUED | OUTPATIENT
Start: 2023-08-25 | End: 2023-08-26 | Stop reason: HOSPADM

## 2023-08-25 RX ORDER — HEPARIN SODIUM 5000 [USP'U]/ML
5000 INJECTION, SOLUTION INTRAVENOUS; SUBCUTANEOUS EVERY 8 HOURS
Status: DISCONTINUED | OUTPATIENT
Start: 2023-08-25 | End: 2023-08-26 | Stop reason: HOSPADM

## 2023-08-25 RX ORDER — LANOLIN ALCOHOL/MO/W.PET/CERES
400 CREAM (GRAM) TOPICAL ONCE
Status: COMPLETED | OUTPATIENT
Start: 2023-08-25 | End: 2023-08-25

## 2023-08-25 RX ORDER — TALC
6 POWDER (GRAM) TOPICAL NIGHTLY PRN
Status: DISCONTINUED | OUTPATIENT
Start: 2023-08-25 | End: 2023-08-26 | Stop reason: HOSPADM

## 2023-08-25 RX ORDER — ATORVASTATIN CALCIUM 10 MG/1
10 TABLET, FILM COATED ORAL DAILY
Status: DISCONTINUED | OUTPATIENT
Start: 2023-08-26 | End: 2023-08-26 | Stop reason: HOSPADM

## 2023-08-25 RX ORDER — SODIUM CHLORIDE 0.9 % (FLUSH) 0.9 %
10 SYRINGE (ML) INJECTION
Status: DISCONTINUED | OUTPATIENT
Start: 2023-08-25 | End: 2023-08-26 | Stop reason: HOSPADM

## 2023-08-25 RX ORDER — GLUCAGON 1 MG
1 KIT INJECTION
Status: DISCONTINUED | OUTPATIENT
Start: 2023-08-25 | End: 2023-08-26 | Stop reason: HOSPADM

## 2023-08-25 RX ORDER — SODIUM CHLORIDE 0.9 % (FLUSH) 0.9 %
10 SYRINGE (ML) INJECTION EVERY 12 HOURS PRN
Status: DISCONTINUED | OUTPATIENT
Start: 2023-08-25 | End: 2023-08-26 | Stop reason: HOSPADM

## 2023-08-25 RX ORDER — TALC
6 POWDER (GRAM) TOPICAL NIGHTLY PRN
Status: DISCONTINUED | OUTPATIENT
Start: 2023-08-25 | End: 2023-08-25

## 2023-08-25 RX ORDER — LOSARTAN POTASSIUM 50 MG/1
100 TABLET ORAL DAILY
Status: DISCONTINUED | OUTPATIENT
Start: 2023-08-26 | End: 2023-08-25

## 2023-08-25 RX ORDER — NALOXONE HCL 0.4 MG/ML
0.02 VIAL (ML) INJECTION
Status: DISCONTINUED | OUTPATIENT
Start: 2023-08-25 | End: 2023-08-26 | Stop reason: HOSPADM

## 2023-08-25 RX ORDER — OXYCODONE HYDROCHLORIDE 5 MG/1
5 TABLET ORAL EVERY 6 HOURS PRN
Status: DISCONTINUED | OUTPATIENT
Start: 2023-08-25 | End: 2023-08-26 | Stop reason: HOSPADM

## 2023-08-25 RX ORDER — ALBUTEROL SULFATE 90 UG/1
2 AEROSOL, METERED RESPIRATORY (INHALATION) EVERY 6 HOURS PRN
Status: DISCONTINUED | OUTPATIENT
Start: 2023-08-25 | End: 2023-08-26 | Stop reason: HOSPADM

## 2023-08-25 RX ORDER — ASPIRIN 81 MG/1
81 TABLET ORAL DAILY
Status: DISCONTINUED | OUTPATIENT
Start: 2023-08-26 | End: 2023-08-26 | Stop reason: HOSPADM

## 2023-08-25 RX ORDER — LIDOCAINE HYDROCHLORIDE 20 MG/ML
INJECTION INTRAVENOUS
Status: DISCONTINUED | OUTPATIENT
Start: 2023-08-25 | End: 2023-08-25

## 2023-08-25 RX ORDER — AMLODIPINE BESYLATE 10 MG/1
10 TABLET ORAL DAILY
Status: DISCONTINUED | OUTPATIENT
Start: 2023-08-26 | End: 2023-08-25

## 2023-08-25 RX ORDER — PANTOPRAZOLE SODIUM 40 MG/1
40 TABLET, DELAYED RELEASE ORAL DAILY
Status: DISCONTINUED | OUTPATIENT
Start: 2023-08-26 | End: 2023-08-26 | Stop reason: HOSPADM

## 2023-08-25 RX ORDER — PROPOFOL 10 MG/ML
VIAL (ML) INTRAVENOUS
Status: DISCONTINUED | OUTPATIENT
Start: 2023-08-25 | End: 2023-08-25

## 2023-08-25 RX ORDER — IBUPROFEN 200 MG
16 TABLET ORAL
Status: DISCONTINUED | OUTPATIENT
Start: 2023-08-25 | End: 2023-08-26 | Stop reason: HOSPADM

## 2023-08-25 RX ORDER — ACETAMINOPHEN 500 MG
1000 TABLET ORAL EVERY 8 HOURS PRN
Status: DISCONTINUED | OUTPATIENT
Start: 2023-08-25 | End: 2023-08-26 | Stop reason: HOSPADM

## 2023-08-25 RX ADMIN — SODIUM CHLORIDE: 0.9 INJECTION, SOLUTION INTRAVENOUS at 10:08

## 2023-08-25 RX ADMIN — PROPOFOL 160 MCG/KG/MIN: 10 INJECTION, EMULSION INTRAVENOUS at 10:08

## 2023-08-25 RX ADMIN — LIDOCAINE HYDROCHLORIDE 100 MG: 20 INJECTION INTRAVENOUS at 10:08

## 2023-08-25 RX ADMIN — SODIUM CHLORIDE: 0.9 INJECTION, SOLUTION INTRAVENOUS at 11:08

## 2023-08-25 RX ADMIN — Medication 400 MG: at 04:08

## 2023-08-25 RX ADMIN — HEPARIN SODIUM 5000 UNITS: 5000 INJECTION INTRAVENOUS; SUBCUTANEOUS at 09:08

## 2023-08-25 NOTE — ASSESSMENT & PLAN NOTE
Noted to be in a narrow-complex, junctional bradycardia after colonoscopy today; possibly anesthetic-induced?  Noted to have fatigue, dizziness x1 month; no additional s/s angina or heart failure.  HR improved to mid 60s with exercise in bed with transient restoration of NSR.    Admit to medicine for post-op bradycardia  Hold all AVN blocking agents and hold home antihypertensives for now  Check TSH  Check TTE  Check orthostatic vitals and HR assessment with ambulation (walk test)  Monitor on telemetry  K>4, Mag>2  No indication for pacing at this time

## 2023-08-25 NOTE — H&P
[facility] - Emergency Dept  [facility] Medicine  History & Physical    Patient Name: Taran Hernandes  MRN: [id number]  Patient Class: OP- Observation  Admission Date: 8/25/2023  Attending Physician: Milind Espinal MD   Primary Care Provider: Rosita Rios MD         Patient information was obtained from patient, past medical records and ER records.     Subjective:     Principal Problem:Bradycardia    Chief Complaint:   Chief Complaint   Patient presents with    Bradycardia     Bradycardia post colonscopy. Patient received propofol and lidocaine during procedure        HPI: 82-year-old male history of hypertension, hyperlipidemia, presenting to the emergency department for bradycardia.  Patient was in endoscopy receiving a colonoscopy in which he received propofol lidocaine.  Patient was bradycardic at heart rate of 30s given glycopyrrolate x2 with minimal improvement from heart rate in the 30s to the high 50s.  Postprocedure patient was bradycardic and brought down for continued monitoring and assessment and evaluation of bradycardia.  Patient reports over the past month he has had repeated episodes of dizziness when which he feels the room is spinning and he is lightheaded.  Son is also at bedside and reports that patient has been more fatigued over the last couple months.  Patient denies any cardiac history.  Patient denies any actual episodes of syncope, chest pain, shortness of breath.  Patient reports current mild dizziness but no other symptoms at this time.    In the ED patient afebrile and hemodynamically stable saturating well on room air. HR in 40-50's. EKG with junctional rhythm bradycardia. Patient denies symptoms at this time at rest. Denies dizziness, shortness of breath, chest pain, palpitations, nausea, vomiting, abdominal pain, confusion. Cardiology consulted and patient admitted to the care of medicine for further evaluation and management.      Past Medical History:   Diagnosis Date    BPH  (benign prostatic hyperplasia)     BPH with urinary obstruction 8/22/2016    DDD (degenerative disc disease), lumbar     Dyslipidemia     Elevated prostate specific antigen (PSA) 8/22/2016    Fatty liver     GERD (gastroesophageal reflux disease)     Hematuria     History of adenomatous polyp of colon 7/17/2015    History of colonic polyps     Hypertension     Lumbar disc disease     Mild vitamin D deficiency     Renal disorder     S/P TURP 3/9/2020       Past Surgical History:   Procedure Laterality Date    CATARACT EXTRACTION W/  INTRAOCULAR LENS IMPLANT Left     Dr Ruggiero     CATARACT EXTRACTION W/  INTRAOCULAR LENS IMPLANT Right 03/27/16    Dr Ruggiero    CHOLECYSTECTOMY      COLONOSCOPY N/A 6/10/2021    Procedure: COLONOSCOPY;  Surgeon: Moody Morton MD;  Location: [facility] (4TH FLR);  Service: Endoscopy;  Laterality: N/A;  prep ins. emailed -Pt. fully vaccinated.Feb.2021 [facility]    EYE SURGERY      FRACTURE SURGERY      left ankle    LASIK Bilateral 2016    NEUROMA SURGERY Right 2006    TRANSURETHRAL RESECTION OF PROSTATE N/A 3/3/2020    Procedure: TURP (TRANSURETHRAL RESECTION OF PROSTATE);  Surgeon: Vincent King MD;  Location: [facility] OR 1ST FLR;  Service: Urology;  Laterality: N/A;  2hr       Review of patient's allergies indicates:   Allergen Reactions    No known allergies        Current Facility-Administered Medications on File Prior to Encounter   Medication    [DISCONTINUED] 0.9%  NaCl infusion    [DISCONTINUED] LIDOcaine (cardiac) injection    [DISCONTINUED] propofol (DIPRIVAN) 10 mg/mL infusion    [DISCONTINUED] sodium chloride 0.9% infusion     Current Outpatient Medications on File Prior to Encounter   Medication Sig    acetaminophen (TYLENOL) 500 MG tablet Take 1,000 mg by mouth daily as needed for Pain.    amLODIPine (NORVASC) 10 MG tablet Take 1 tablet (10 mg total) by mouth once daily.    aspirin (ECOTRIN) 81 MG EC tablet Take 81 mg by mouth once daily.      atorvastatin (LIPITOR) 10 MG tablet Take 1 tablet (10 mg total) by mouth once daily.    lanolin/mineral oil/petrolatum (ARTIFICIAL TEARS OPHT) Place 2 drops into both eyes daily as needed (Dry eye).    losartan (COZAAR) 100 MG tablet Take 1 tablet (100 mg total) by mouth once daily.    omeprazole (PRILOSEC) 20 MG capsule Take 20 mg by mouth once daily.    vitamin D (VITAMIN D3) 1000 units Tab Take 1,000 Units by mouth once daily.     Family History       Problem Relation (Age of Onset)    Cancer Mother, Brother    Diabetes Father    Heart attack Son    Heart disease Father, Son    Hypertension Father, Brother    Kidney disease Son    No Known Problems Daughter, Son, Son, Son          Tobacco Use    Smoking status: Never    Smokeless tobacco: Never   Substance and Sexual Activity    Alcohol use: Not Currently     Alcohol/week: 2.0 standard drinks of alcohol     Types: 2 Cans of beer per week     Comment: beer once a month    Drug use: No    Sexual activity: Yes     Partners: Female     Review of Systems   Constitutional:  Negative for chills, fatigue and fever.   HENT:  Negative for sore throat and trouble swallowing.    Eyes:  Negative for photophobia and visual disturbance.   Respiratory:  Negative for cough, shortness of breath and wheezing.    Cardiovascular:  Negative for chest pain, palpitations and leg swelling.   Gastrointestinal:  Negative for abdominal distention, abdominal pain, diarrhea, nausea and vomiting.   Genitourinary:  Negative for dysuria and hematuria.   Musculoskeletal:  Negative for neck pain and neck stiffness.   Skin:  Negative for rash and wound.   Neurological:  Negative for dizziness, seizures, syncope, weakness, light-headedness, numbness and headaches.   Psychiatric/Behavioral:  Negative for confusion and decreased concentration.      Objective:     Vital Signs (Most Recent):  Temp: 97.9 °F (36.6 °C) (08/25/23 1300)  Pulse: (!) 46 (08/25/23 1730)  Resp: 10 (08/25/23 1730)  BP:  (!) 156/77 (08/25/23 1656)  SpO2: 96 % (08/25/23 1730) Vital Signs (24h Range):  Temp:  [97.7 °F (36.5 °C)-97.9 °F (36.6 °C)] 97.9 °F (36.6 °C)  Pulse:  [40-62] 46  Resp:  [10-20] 10  SpO2:  [94 %-97 %] 96 %  BP: ()/(56-95) 156/77     Weight: 77.1 kg (170 lb)  Body mass index is 29.18 kg/m².     Physical Exam  Constitutional:       General: He is not in acute distress.     Appearance: He is not toxic-appearing or diaphoretic.   HENT:      Head: Normocephalic and atraumatic.      Nose: Nose normal.   Eyes:      General: No scleral icterus.     Extraocular Movements: Extraocular movements intact.      Pupils: Pupils are equal, round, and reactive to light.   Cardiovascular:      Rate and Rhythm: Bradycardia present. Rhythm irregular.   Pulmonary:      Effort: Pulmonary effort is normal. No respiratory distress.      Breath sounds: No wheezing or rales.   Abdominal:      General: Abdomen is flat. There is no distension.      Palpations: Abdomen is soft.      Tenderness: There is no abdominal tenderness. There is no guarding.   Musculoskeletal:         General: Normal range of motion.      Cervical back: Normal range of motion and neck supple. No rigidity.      Right lower leg: No edema.      Left lower leg: No edema.   Skin:     General: Skin is warm and dry.      Coloration: Skin is not jaundiced.   Neurological:      General: No focal deficit present.      Mental Status: He is alert and oriented to person, place, and time.      Cranial Nerves: No cranial nerve deficit.   Psychiatric:         Mood and Affect: Mood normal.         Behavior: Behavior normal.              CRANIAL NERVES     CN III, IV, VI   Pupils are equal, round, and reactive to light.       Significant Labs: All pertinent labs within the past 24 hours have been reviewed.  CBC:   Recent Labs   Lab 08/25/23  1558   WBC 6.31   HGB 13.6*   HCT 41.4        CMP:   Recent Labs   Lab 08/25/23  1310      K 4.3      CO2 21*   GLU 85    BUN 10   CREATININE 1.4   CALCIUM 8.2*   PROT 6.9   ALBUMIN 3.6   BILITOT 0.7   ALKPHOS 68   AST 47*   ALT 41   ANIONGAP 11       Significant Imaging: I have reviewed all pertinent imaging results/findings within the past 24 hours.    Assessment/Plan:     * Bradycardia  - Sinus bradycardia with intermittent junctional rhythm. Hemodynamically stable. Maybe secondary to anaesthesia.   - Holding all AVN blocking agents and hold home antihypertensives for now  (holdinghome amlodipine and losartan)  - TSH wnl  - TTE pending  - orthostatic vitals qshift  - plan for HR assessment with ambulation (walk test) in am  - Monitor on telemetry  - Atropine PRN for sustained HR <40  - goal K>4, Mag>2  - No indication for pacing at this time per cardiology  - npo midnight as precaution  - continue home ASA and statin    Hyperlipidemia  - continue home ASA and statin      Hypertension  - holding home amlodipine and losartan while monitoring bradycardia/hemodynamics      CKD (chronic kidney disease), stage III  - Creatinine 1.4 on presentation which appears to be baseline  - avoid nephrotoxic agents as appropriate  - continue to monitor      VTE Risk Mitigation (From admission, onward)         Ordered     heparin (porcine) injection 5,000 Units  Every 8 hours         08/25/23 1722     IP VTE HIGH RISK PATIENT  Once         08/25/23 1722     Place sequential compression device  Until discontinued         08/25/23 1722     Place sequential compression device  Until discontinued         08/25/23 1650                   On 08/25/2023, patient should be placed in hospital observation services under my care.        Gerald Frazier MD  Department of Hospital Medicine  [facility] - Emergency Dept

## 2023-08-25 NOTE — TRANSFER OF CARE
"Anesthesia Transfer of Care Note    Patient: Taran Hernandes    Procedure(s) Performed: Procedure(s) (LRB):  COLONOSCOPY (N/A)    Patient location: GI    Anesthesia Type: general    Transport from OR: Transported from OR on room air with adequate spontaneous ventilation    Post pain: adequate analgesia    Post assessment: no apparent anesthetic complications    Post vital signs: stable    Level of consciousness: awake    Nausea/Vomiting: no nausea/vomiting    Complications: none    Transfer of care protocol was followed      Last vitals:   Visit Vitals  BP (!) 152/56 (BP Location: Left arm, Patient Position: Lying)   Pulse (!) 45   Temp 36.5 °C (97.7 °F) (Temporal)   Resp 18   Ht 5' 4" (1.626 m)   Wt 77.1 kg (170 lb)   SpO2 (!) 94%   BMI 29.18 kg/m²     "

## 2023-08-25 NOTE — ED PROVIDER NOTES
Encounter Date: 8/25/2023       History     Chief Complaint   Patient presents with    Bradycardia     Bradycardia post colonscopy. Patient received propofol and lidocaine during procedure     82-year-old male history of hypertension, hyperlipidemia, presenting to the emergency department for bradycardia.  Patient was in endoscopy receiving a colonoscopy in which he received propofol lidocaine.  Patient was bradycardic at heart rate of 30s given glycopyrrolate x2 with minimal improvement from heart rate in the 30s to the high 50s.  Postprocedure patient was bradycardic and brought down for continued monitoring and assessment and evaluation of bradycardia.  Patient reports over the past month he has had repeated episodes of dizziness when which he feels the room is spinning and he is lightheaded.  Son is also at bedside and reports that patient has been more fatigued over the last couple months.  Patient denies any cardiac history.  Patient denies any actual episodes of syncope, chest pain, shortness of breath.  Patient reports current mild dizziness but no other symptoms at this time.    The history is provided by the patient and a relative.     Review of patient's allergies indicates:   Allergen Reactions    No known allergies      Past Medical History:   Diagnosis Date    BPH (benign prostatic hyperplasia)     BPH with urinary obstruction 8/22/2016    DDD (degenerative disc disease), lumbar     Dyslipidemia     Elevated prostate specific antigen (PSA) 8/22/2016    Fatty liver     GERD (gastroesophageal reflux disease)     Hematuria     History of adenomatous polyp of colon 7/17/2015    History of colonic polyps     Hypertension     Lumbar disc disease     Mild vitamin D deficiency     Renal disorder     S/P TURP 3/9/2020     Past Surgical History:   Procedure Laterality Date    CATARACT EXTRACTION W/  INTRAOCULAR LENS IMPLANT Left     Dr Ruggiero     CATARACT EXTRACTION W/  INTRAOCULAR LENS IMPLANT Right 03/27/16     Dr Ruggiero    CHOLECYSTECTOMY      COLONOSCOPY N/A 6/10/2021    Procedure: COLONOSCOPY;  Surgeon: Moody Morton MD;  Location: [facility] ENDO (4TH FLR);  Service: Endoscopy;  Laterality: N/A;  prep ins. emailed -Pt. fully vaccinated.Feb.2021 EC    COLONOSCOPY N/A 8/25/2023    Procedure: COLONOSCOPY;  Surgeon: Moody Morton MD;  Location: [facility] ENDO (4TH FLR);  Service: Endoscopy;  Laterality: N/A;  Referral: KAITLYN CASTRO  [place] states will most likely not answer phone since he can't hear, uses [facility]  PEG   Inst portal and handed to pt    EYE SURGERY      FRACTURE SURGERY      left ankle    LASIK Bilateral 2016    NEUROMA SURGERY Right 2006    TRANSURETHRAL RESECTION OF PROSTATE N/A 3/3/2020    Procedure: TURP (TRANSURETHRAL RESECTION OF PROSTATE);  Surgeon: Vincent King MD;  Location: [facility] OR 1ST FLR;  Service: Urology;  Laterality: N/A;  2hr     Family History   Problem Relation Age of Onset    Cancer Mother         complications of c-scope/ suspect colon cancer    Hypertension Father     Diabetes Father     Heart disease Father     Cancer Brother         liver/ etoh    Hypertension Brother     No Known Problems Daughter     Heart attack Son     Heart disease Son     Kidney disease Son         kidney removed for tumor    No Known Problems Son     No Known Problems Son     No Known Problems Son     Melanoma Neg Hx     Blindness Neg Hx     Glaucoma Neg Hx     Macular degeneration Neg Hx     Retinal detachment Neg Hx     Anesthesia problems Neg Hx      Social History     Tobacco Use    Smoking status: Never    Smokeless tobacco: Never   Substance Use Topics    Alcohol use: Not Currently     Alcohol/week: 2.0 standard drinks of alcohol     Types: 2 Cans of beer per week     Comment: beer once a month    Drug use: No       Physical Exam     Initial Vitals [08/25/23 1300]   BP Pulse Resp Temp SpO2   109/72 (!) 51 20 97.9 °F (36.6 °C) 95 %      MAP       --         Physical Exam    Nursing note and  vitals reviewed.  Constitutional: He appears well-developed and well-nourished.   HENT:   Head: Normocephalic and atraumatic.   Eyes: EOM are normal. Pupils are equal, round, and reactive to light.   Neck:   Normal range of motion.  Cardiovascular:    Bradycardia present.         Pulmonary/Chest: Breath sounds normal. No respiratory distress. He exhibits no tenderness.   Abdominal: Abdomen is soft. He exhibits no distension. There is no abdominal tenderness.   Musculoskeletal:      Cervical back: Normal range of motion.     Neurological: He is alert and oriented to person, place, and time.   Skin: Skin is warm.         ED Course   Procedures  Labs Reviewed   B-TYPE NATRIURETIC PEPTIDE - Abnormal; Notable for the following components:       Result Value     (*)     All other components within normal limits   COMPREHENSIVE METABOLIC PANEL - Abnormal; Notable for the following components:    CO2 21 (*)     Calcium 8.2 (*)     AST 47 (*)     eGFR 49.9 (*)     All other components within normal limits    Narrative:     add on cmp per  /order#[id number] @ 08/25/2023  14:27       add on tsh per  /order#[id number] @ 08/25/2023  14:15    CBC W/ AUTO DIFFERENTIAL - Abnormal; Notable for the following components:    Hemoglobin 13.6 (*)     All other components within normal limits   TROPONIN I   MAGNESIUM   PHOSPHORUS   CBC W/ AUTO DIFFERENTIAL   COMPREHENSIVE METABOLIC PANEL   TSH   TSH    Narrative:     add on cmp per  /order#[id number] @ 08/25/2023  14:27       add on tsh per  /order#[id number] @ 08/25/2023  14:15      EKG Readings: (Independently Interpreted)   Initial Reading: No STEMI. Rhythm: Junctional Rhythm.   Junctional bradycardia     ECG Results              EKG 12-lead (Final result)  Result time 08/25/23 17:47:26      Final result by Interface, Lab In [facility] (08/25/23 17:47:26)                   Narrative:    Test Reason : R00.1,    Vent. Rate : 054 BPM      Atrial Rate : 100 BPM     P-R Int : 000 ms          QRS Dur : 078 ms      QT Int : 444 ms       P-R-T Axes : 000 003 -09 degrees     QTc Int : 421 ms    Junctional rhythm  Nonspecific T wave abnormality  Abnormal ECG  When compared with ECG of 21-FEB-2020 14:22,  No significant change was found  Confirmed by Veronica Paredes MD ([id number]) on 8/25/2023 5:47:18 PM    Referred By: [email or web address]   SELF           Confirmed By:Veronica Paredes MD                                     EKG 12-lead (Final result)  Result time 08/25/23 17:52:02      Final result by Interface, Lab In [facility] (08/25/23 17:52:02)                   Narrative:    Test Reason : R00.1,    Vent. Rate : 052 BPM     Atrial Rate : 000 BPM     P-R Int : 000 ms          QRS Dur : 086 ms      QT Int : 456 ms       P-R-T Axes : 000 -10 -02 degrees     QTc Int : 424 ms    Junctional rhythm  Abnormal ECG  No previous ECGs available  Confirmed by Veronica Paredes MD ([id number]) on 8/25/2023 5:51:59 PM    Referred By: [email or web address]   SELF           Confirmed By:Veronica Paredes MD                                  Imaging Results              X-Ray Chest AP Portable (Final result)  Result time 08/25/23 13:22:56      Final result by Bret Dickson MD (08/25/23 13:22:56)                   Impression:      No acute cardiopulmonary disease.  Allowing for difference in technique, there probably has been no significant change in the cardiopulmonary status.      Electronically signed by: Bret Dickson MD  Date:    08/25/2023  Time:    13:22               Narrative:    EXAMINATION:  XR CHEST AP PORTABLE    CLINICAL HISTORY:  Bradycardia, unspecified    TECHNIQUE:  Single frontal view of the chest was performed.    COMPARISON:  12/26/2009    FINDINGS:  Cardiac silhouette is magnified by portable AP technique.  The heart appears to be near the upper limits of normal size.  Tortuous thoracic aorta and brachiocephalic vessels with atherosclerotic calcification in the wall of the aortic arch.  Pulmonary  vascularity does not appear congested.  Lungs are satisfactorily expanded and appear free of active disease.  No pleural fluid or pneumothorax.  Skeletal structures appear intact.                                       Medications   sodium chloride 0.9% flush 10 mL (has no administration in time range)   aspirin EC tablet 81 mg (has no administration in time range)   atorvastatin tablet 10 mg (has no administration in time range)   pantoprazole EC tablet 40 mg (has no administration in time range)   sodium chloride 0.9% flush 10 mL (has no administration in time range)   naloxone 0.4 mg/mL injection 0.02 mg (has no administration in time range)   glucose chewable tablet 16 g (has no administration in time range)   glucose chewable tablet 24 g (has no administration in time range)   glucagon (human recombinant) injection 1 mg (has no administration in time range)   heparin (porcine) injection 5,000 Units (5,000 Units Subcutaneous Given 8/26/23 5141)   acetaminophen tablet 1,000 mg (has no administration in time range)   oxyCODONE immediate release tablet 5 mg (has no administration in time range)   albuterol inhaler 2 puff (has no administration in time range)   melatonin tablet 6 mg (has no administration in time range)   aluminum-magnesium hydroxide-simethicone 200-200-20 mg/5 mL suspension 30 mL (has no administration in time range)   dextrose 10% bolus 125 mL 125 mL (has no administration in time range)   dextrose 10% bolus 250 mL 250 mL (has no administration in time range)   magnesium oxide tablet 400 mg (400 mg Oral Given 8/25/23 3775)     Medical Decision Making  82-year-old male presenting to the emergency department endoscopy after concern for persistent bradycardia despite glycopyrrolate.  Patient is sent down to the emergency department for evaluation.  Here patient is complaining of mild dizziness but alert oriented.  Patient is bradycardic with all other vitals within normal limits.  Patient is  normotensive.  Repeat EKG concerning for junctional bradycardia.  Cardiology evaluated patient and felt that it may potentially be secondary to postprocedure but felt that given persistent junctional bradycardia to obtain labs and admit for observation.  CBC, CMP obtained to evaluate for leukocytosis, anemia, metabolic derangements, Mag and phos obtained for electrolyte derangements.  At this time labs results remaining with plan for admission to observation.  See final results plan and disposition by oncoming care team.    Amount and/or Complexity of Data Reviewed  Labs: ordered. Decision-making details documented in ED Course.  Radiology: ordered.    Risk  Prescription drug management.  Decision regarding hospitalization.              Attending Attestation:   Physician Attestation Statement for Resident:  As the supervising MD   Physician Attestation Statement: I have personally seen and examined this patient.   I agree with the above history.  -:   As the supervising MD I agree with the above PE.     As the supervising MD I agree with the above treatment, course, plan, and disposition.   -: Pt is an 83 yo M with bradycardia with a HR down to 32 immediately following a colonoscopy today where he was sedated with propofol. He was given glycopyrrolate 0.2 mg x 2 doses for reflex bradycardia and his HR is now in the 50's. He is in junctional bradycardia. He is not on a calcium channel blocker or beta blocker (per his annual check up in June 2023). Cardiology was consulted. Dr. Ziegler to see the patient.   Pt will require admission to                    ED Course as of 08/26/23 0650   Fri Aug 25, 2023   1255 EKG independently interpreted by me done at 12:33 p.m. patient is in junctional rhythm rate of 52 nonspecific ST wave abnormality [GK]   1304 Patient did receive glycopyrrolate 0.2 mg x 2 for reflux bradycardia after the procedure and his HR went from the 30's to the 50's [GK]   1621 Hemoglobin(!): 13.6 [MK]       ED Course User Index  [GK] Katie Heller MD  [MK] Sandi Brooks MD                    Clinical Impression:   Final diagnoses:  [R00.1] Bradycardia        ED Disposition Condition    Observation Stable                Sebastián Mcarthur MD  Resident  08/25/23 1455       Katie Heller MD  08/26/23 4897

## 2023-08-25 NOTE — RESPIRATORY THERAPY
"RAPID RESPONSE RESPIRATORY THERAPY NOTE             Code Status: Prior   : 1940  Bed: ED :   MRN: [id number]  Time page Received: 1221  Time Rapid Response RT at Bedside: 1225  Time Rapid Response RT left Bedside: 1256    SITUATION    Evaluated patient for: Bradycardia    BACKGROUND    Why is the patient in the hospital?: <principal problem not specified>    Patient has a past medical history of BPH (benign prostatic hyperplasia), BPH with urinary obstruction, DDD (degenerative disc disease), lumbar, Dyslipidemia, Elevated prostate specific antigen (PSA), Fatty liver, GERD (gastroesophageal reflux disease), Hematuria, History of adenomatous polyp of colon, History of colonic polyps, Hypertension, Lumbar disc disease, Mild vitamin D deficiency, Renal disorder, and S/P TURP.    24 Hours Vitals Range:  Temp:  [97.7 °F (36.5 °C)-97.9 °F (36.6 °C)]   Pulse:  [40-52]   Resp:  [16-20]   BP: ()/(56-76)   SpO2:  [94 %-96 %]     Labs:    No results for input(s): "NA", "K", "CL", "CO2", "BUN", "CREATININE", "GLU", "PHOS", "MG" in the last 72 hours.    Invalid input(s): "CMP", "TBIL"     No results for input(s): "PH", "PCO2", "PO2", "HCO3", "POCSATURATED", "BE" in the last 72 hours.    ASSESSMENT/INTERVENTIONS  Pt in bed comfortable with no respiratory needs at this time.     Last Vitals: Temp: 97.9 °F (36.6 °C) ( 1300)  Pulse: 52 ( 1303)  Resp: 17 ( 1303)  BP: 108/75 ( 1303)  SpO2: 95 % ( 1303)  Level of Consciousness: Level of Consciousness (AVPU): alert  Respiratory Effort:    Expansion/Accessory Muscle Usage:    All Lung Field Breath Sounds:    O2 Device/Concentration: 2L N.C.  NIPPV: No Surgical airway: No Vent: No  ETCO2 monitored:    Ambu at bedside:      Active Orders   There are no active orders of the following types: Respiratory Care.       RECOMMENDATIONS  ?  We recommend: RRT Recs: Pt to E.D. for further care.    ESCALATION        FOLLOW-UP    Disposition: Tx to ER bed " 07.    Please call back the Rapid Response RT, Sudhir Victoria, RITA at x [phone] for any questions or concerns.

## 2023-08-25 NOTE — ED TRIAGE NOTES
Patient presents to the ED today with reports of bradycardiac post coloscopy. Patient found to be bradycardiac at 51 post coloscopy. Patient received propofol and lidocaine during procedure. Patient reports feeling dizzy at this time. Patient awake alert and oriented on arrival to ED.    190

## 2023-08-25 NOTE — CONSULTS
[facility] - Emergency Dept  Cardiology  Consult Note    Patient Name: Taran Hernandes  MRN: [id number]  Admission Date: 8/25/2023  Hospital Length of Stay: 0 days  Code Status: Prior   Attending Provider: Katie Heller,*   Consulting Provider: Ethan Ziegler MD  Primary Care Physician: Rosita Rios MD  Principal Problem:<principal problem not specified>    Patient information was obtained from patient, relative(s) and ER records.     Inpatient consult to Cardiology  Consult performed by: Ethan Ziegler MD  Consult ordered by: Sebastián Mcarthur MD  Reason for consult: bradycardia        Subjective:     Chief Complaint:  Bradycardia     HPI:   Mr Hernandes is a 83 yo M with PMH HTN, HLD presenting to the ED after colonoscopy today for bradycardia. Pt accompanied by his son and wife. He has been feeling dizzy and tired for the past month. No falls, but has caught himself off balance a few times. No syncope, chest pain, SOB, PND, orthopnea, etc. Colonoscopy done today and revealed polyps and diverticulosis. Given Propofol and Lidocaine for procedure.     In the ED, he was hemodynamically stable. HR in the upper 40s in junctional bradycardia. Troponin negative. Cardiology consulted for bradycardia.       Past Medical History:   Diagnosis Date    BPH (benign prostatic hyperplasia)     BPH with urinary obstruction 8/22/2016    DDD (degenerative disc disease), lumbar     Dyslipidemia     Elevated prostate specific antigen (PSA) 8/22/2016    Fatty liver     GERD (gastroesophageal reflux disease)     Hematuria     History of adenomatous polyp of colon 7/17/2015    History of colonic polyps     Hypertension     Lumbar disc disease     Mild vitamin D deficiency     Renal disorder     S/P TURP 3/9/2020       Past Surgical History:   Procedure Laterality Date    CATARACT EXTRACTION W/  INTRAOCULAR LENS IMPLANT Left     Dr Ruggiero     CATARACT EXTRACTION W/  INTRAOCULAR LENS IMPLANT Right 03/27/16    Dr Ruggiero     CHOLECYSTECTOMY      COLONOSCOPY N/A 6/10/2021    Procedure: COLONOSCOPY;  Surgeon: Moody Morton MD;  Location: [facility] (4TH FLR);  Service: Endoscopy;  Laterality: N/A;  prep ins. emailed -Pt. fully vaccinated.Feb.2021 EC    EYE SURGERY      FRACTURE SURGERY      left ankle    LASIK Bilateral 2016    NEUROMA SURGERY Right 2006    TRANSURETHRAL RESECTION OF PROSTATE N/A 3/3/2020    Procedure: TURP (TRANSURETHRAL RESECTION OF PROSTATE);  Surgeon: Vincent King MD;  Location: [facility] OR 1ST FLR;  Service: Urology;  Laterality: N/A;  2hr       Review of patient's allergies indicates:   Allergen Reactions    No known allergies        Current Facility-Administered Medications on File Prior to Encounter   Medication    [DISCONTINUED] 0.9%  NaCl infusion    [DISCONTINUED] LIDOcaine (cardiac) injection    [DISCONTINUED] propofol (DIPRIVAN) 10 mg/mL infusion    [DISCONTINUED] sodium chloride 0.9% infusion     Current Outpatient Medications on File Prior to Encounter   Medication Sig    acetaminophen (TYLENOL) 500 MG tablet Take 500 mg by mouth every 6 (six) hours as needed for Pain.    amLODIPine (NORVASC) 10 MG tablet Take 1 tablet (10 mg total) by mouth once daily.    aspirin (ECOTRIN) 81 MG EC tablet Take 81 mg by mouth once daily.     atorvastatin (LIPITOR) 10 MG tablet Take 1 tablet (10 mg total) by mouth once daily.    losartan (COZAAR) 100 MG tablet Take 1 tablet (100 mg total) by mouth once daily.    omeprazole (PRILOSEC) 20 MG capsule Take 20 mg by mouth once daily.    vitamin D (VITAMIN D3) 1000 units Tab Take 1,000 Units by mouth once daily.     Family History       Problem Relation (Age of Onset)    Cancer Mother, Brother    Diabetes Father    Heart attack Son    Heart disease Father, Son    Hypertension Father, Brother    Kidney disease Son    No Known Problems Daughter, Son, Son, Son          Tobacco Use    Smoking status: Never    Smokeless tobacco: Never   Substance and Sexual Activity    Alcohol use:  Not Currently     Alcohol/week: 2.0 standard drinks of alcohol     Types: 2 Cans of beer per week     Comment: beer once a month    Drug use: No    Sexual activity: Yes     Partners: Female     Review of Systems   Constitutional: Positive for malaise/fatigue.   Musculoskeletal:  Negative for falls.   Neurological:  Positive for dizziness.   All other systems reviewed and are negative.    Objective:     Vital Signs (Most Recent):  Temp: 97.9 °F (36.6 °C) (08/25/23 1300)  Pulse: (!) 49 (08/25/23 1436)  Resp: 19 (08/25/23 1436)  BP: (!) 133/95 (08/25/23 1410)  SpO2: 96 % (08/25/23 1436) Vital Signs (24h Range):  Temp:  [97.7 °F (36.5 °C)-97.9 °F (36.6 °C)] 97.9 °F (36.6 °C)  Pulse:  [40-52] 49  Resp:  [16-20] 19  SpO2:  [94 %-97 %] 96 %  BP: ()/(56-95) 133/95     Weight: 77.1 kg (170 lb)  Body mass index is 29.18 kg/m².    SpO2: 96 %         Intake/Output Summary (Last 24 hours) at 8/25/2023 1441  Last data filed at 8/25/2023 1411  Gross per 24 hour   Intake --   Output 400 ml   Net -400 ml       Lines/Drains/Airways       Peripheral Intravenous Line  Duration                  Peripheral IV - Single Lumen 08/25/23 1009 20 G Right Antecubital <1 day                     Physical Exam  Constitutional:       General: He is not in acute distress.     Appearance: Normal appearance. He is obese. He is not ill-appearing.   HENT:      Head: Normocephalic.      Nose: Nose normal.   Eyes:      Extraocular Movements: Extraocular movements intact.   Cardiovascular:      Rate and Rhythm: Regular rhythm. Bradycardia present.   Pulmonary:      Effort: Pulmonary effort is normal. No respiratory distress.      Breath sounds: No wheezing or rales.   Abdominal:      Palpations: Abdomen is soft.   Musculoskeletal:      Cervical back: Neck supple.      Right lower leg: No edema.      Left lower leg: No edema.   Skin:     General: Skin is warm.   Neurological:      Mental Status: He is alert and oriented to person, place, and time.  "         Significant Labs: BMP:   Recent Labs   Lab 08/25/23  1310   GLU 85      K 4.3      CO2 21*   BUN 10   CREATININE 1.4   CALCIUM 8.2*   MG 1.9   , CMP   Recent Labs   Lab 08/25/23  1310      K 4.3      CO2 21*   GLU 85   BUN 10   CREATININE 1.4   CALCIUM 8.2*   PROT 6.9   ALBUMIN 3.6   BILITOT 0.7   ALKPHOS 68   AST 47*   ALT 41   ANIONGAP 11   , CBC No results for input(s): "WBC", "HGB", "HCT", "PLT" in the last 48 hours., INR No results for input(s): "INR", "PROTIME" in the last 48 hours., Lipid Panel No results for input(s): "CHOL", "HDL", "LDLCALC", "TRIG", "CHOLHDL" in the last 48 hours., and Troponin   Recent Labs   Lab 08/25/23  1310   TROPONINI 0.010       Significant Imaging:   Reviewed    Assessment and Plan:     Bradycardia  Noted to be in a narrow-complex, junctional bradycardia after colonoscopy today; possibly anesthetic-induced?  Noted to have fatigue, dizziness x1 month; no additional s/s angina or heart failure.  HR improved to mid 60s with exercise in bed with transient restoration of NSR.    Admit to medicine for post-op bradycardia  Hold all AVN blocking agents and hold home antihypertensives for now  Check TSH  Check TTE  Check orthostatic vitals and HR assessment with ambulation (walk test)  Monitor on telemetry  Atropine PRN for sustained HR <40  K>4, Mag>2  No indication for pacing at this time        VTE Risk Mitigation (From admission, onward)      None          Case to be discussed with Dr Veronica Paredes MD.     Thank you for your consult. I will follow-up with patient. Please contact us if you have any additional questions.    Ethan Ziegler MD  Cardiology   [facility] - Emergency Dept    "

## 2023-08-25 NOTE — PROVIDER PROGRESS NOTES - EMERGENCY DEPT.
Encounter Date: 8/25/2023    ED Physician Progress Notes          82-year-old male with past medical history of hypertension who came in with bradycardia post colonoscopy procedure.  He was given 2 rounds of glycopyrrolate then.  He was complaining of dizziness and fatigue but mentating well with normal blood pressure.  EKG showing junctional bradycardia.  Cards thinks this is post procedural bradycardia.  He is on Coreg and losartan.  Cardiology recommends obs PT OT continue tele.  He is pending labs(CBC, CMP). Admitted to Hospital medicine for further evaluation and management.

## 2023-08-25 NOTE — ANESTHESIA POSTPROCEDURE EVALUATION
Anesthesia Post Evaluation    Patient: Taran Hernandes    Procedure(s) Performed: Procedure(s) (LRB):  COLONOSCOPY (N/A)    Final Anesthesia Type: general      Patient location during evaluation: PACU  Patient participation: Yes- Able to Participate  Level of consciousness: awake and alert and oriented  Pain management: adequate  Airway patency: patent    PONV status at discharge: No PONV  Anesthetic complications: no      Cardiovascular status: blood pressure returned to baseline and hemodynamically stable  Respiratory status: unassisted  Hydration status: euvolemic  Follow-up not needed.    Post op   Called to bedside bc heart rate in the 30s..  Called for EKG  Patient awake alert pressures were stable - treated with .2mg of robinol, responded to the low 40s, treated again with .2mg robinol , decision made to call rapid response to have fully evaluated in the ED.      Vitals Value Taken Time   BP 99/67 08/25/23 1231   Temp 36.5 °C (97.7 °F) 08/25/23 1156   Pulse 51 08/25/23 1231   Resp 18 08/25/23 1231   SpO2 95 % 08/25/23 1231         No case tracking events are documented in the log.      Pain/Jese Score: Jese Score: 10 (8/25/2023 11:59 AM)

## 2023-08-25 NOTE — HPI
82-year-old male history of hypertension, hyperlipidemia, presenting to the emergency department for bradycardia.  Patient was in endoscopy receiving a colonoscopy in which he received propofol lidocaine.  Patient was bradycardic at heart rate of 30s given glycopyrrolate x2 with minimal improvement from heart rate in the 30s to the high 50s.  Postprocedure patient was bradycardic and brought down for continued monitoring and assessment and evaluation of bradycardia.  Patient reports over the past month he has had repeated episodes of dizziness when which he feels the room is spinning and he is lightheaded.  Son is also at bedside and reports that patient has been more fatigued over the last couple months.  Patient denies any cardiac history.  Patient denies any actual episodes of syncope, chest pain, shortness of breath.  Patient reports current mild dizziness but no other symptoms at this time.    In the ED patient afebrile and hemodynamically stable saturating well on room air. HR in 40-50's. EKG with junctional rhythm bradycardia. Patient denies symptoms at this time at rest. Denies dizziness, shortness of breath, chest pain, palpitations, nausea, vomiting, abdominal pain, confusion. Cardiology consulted and patient admitted to the care of medicine for further evaluation and management.

## 2023-08-25 NOTE — CODE/ RAPID DOCUMENTATION
RAPID RESPONSE NURSE NOTE        Admit Date: 2023  LOS: 0  Code Status: Prior   Date of Consult: 2023  : 1940  Age: 82 y.o.  Weight:   Wt Readings from Last 1 Encounters:   23 77.1 kg (170 lb)     Sex: male  Race: White   Bed: ED :   MRN: [id number]  Time Rapid Response Team page Received: 1222  Time Rapid Response Team at Bedside: 1225  Time Rapid Response Team left Bedside: 1256  Was the patient discharged from an ICU this admission? No  Was the patient discharged from a PACU within last 24 hours? No   Did the patient receive conscious sedation/general anesthesia in last 24 hours? No  Was the patient in the ED within the past 24 hours? No  Was the patient on NIPPV within the past 24 hours? No   Did this progress into an ARC or CPA: No  Attending Physician: HARMEET  Primary Service: NA    SITUATION    Notified by overhead page.  Reason for alert: Rapid Response  Called to evaluate the patient for Dysrythmia    BACKGROUND     Why is the patient in the hospital?: Colonoscopy    Patient has a past medical history of BPH (benign prostatic hyperplasia), BPH with urinary obstruction, DDD (degenerative disc disease), lumbar, Dyslipidemia, Elevated prostate specific antigen (PSA), Fatty liver, GERD (gastroesophageal reflux disease), Hematuria, History of adenomatous polyp of colon, History of colonic polyps, Hypertension, Lumbar disc disease, Mild vitamin D deficiency, Renal disorder, and S/P TURP.    ASSESSMENT/INTERVENTIONS    What did you find: Pt in endo recovery area post colonoscopy. Per report, pt returned from procedure and became acutely bradycardic to 30s, sustained, Bps 90s/50s. On our exam, pt is groggy but oriented, HR 50, BP 99/67. STAT EKG obtained per bedside endo team. Pt brought to ED for further evaluation. Son and wife present.    RECOMMENDATIONS    We recommend: ED transfer and evaluation.    PROVIDER ESCALATION    Orders received and case discussed with Dr. Heller .    FOLLOW  UP    Call the Rapid Response Nurse, Jun Lee RN at [phone] for additional questions or concerns.

## 2023-08-25 NOTE — ASSESSMENT & PLAN NOTE
- Sinus bradycardia with intermittent junctional rhythm. Hemodynamically stable. Maybe secondary to anaesthesia.   - Holding all AVN blocking agents and hold home antihypertensives for now  (holdinghome amlodipine and losartan)  - TSH wnl  - TTE pending  - orthostatic vitals qshift  - plan for HR assessment with ambulation (walk test) in am  - Monitor on telemetry  - Atropine PRN for sustained HR <40  - goal K>4, Mag>2  - No indication for pacing at this time per cardiology  - npo midnight as precaution  - continue home ASA and statin

## 2023-08-25 NOTE — SUBJECTIVE & OBJECTIVE
Past Medical History:   Diagnosis Date    BPH (benign prostatic hyperplasia)     BPH with urinary obstruction 8/22/2016    DDD (degenerative disc disease), lumbar     Dyslipidemia     Elevated prostate specific antigen (PSA) 8/22/2016    Fatty liver     GERD (gastroesophageal reflux disease)     Hematuria     History of adenomatous polyp of colon 7/17/2015    History of colonic polyps     Hypertension     Lumbar disc disease     Mild vitamin D deficiency     Renal disorder     S/P TURP 3/9/2020       Past Surgical History:   Procedure Laterality Date    CATARACT EXTRACTION W/  INTRAOCULAR LENS IMPLANT Left     Dr Ruggiero     CATARACT EXTRACTION W/  INTRAOCULAR LENS IMPLANT Right 03/27/16    Dr Ruggiero    CHOLECYSTECTOMY      COLONOSCOPY N/A 6/10/2021    Procedure: COLONOSCOPY;  Surgeon: Moody Morton MD;  [facility]  Service: Endoscopy;  Laterality: N/A;  prep ins. emailed -Pt. fully vaccinated.Feb.2021 [facility]    EYE SURGERY      FRACTURE SURGERY      left ankle    LASIK Bilateral 2016    NEUROMA SURGERY Right 2006    TRANSURETHRAL RESECTION OF PROSTATE N/A 3/3/2020    Procedure: TURP (TRANSURETHRAL RESECTION OF PROSTATE);  Surgeon: Vincetn King MD;  Location: [facility] OR 1ST FLR;  Service: Urology;  Laterality: N/A;  2hr       Review of patient's allergies indicates:   Allergen Reactions    No known allergies        Current Facility-Administered Medications on File Prior to Encounter   Medication    [DISCONTINUED] 0.9%  NaCl infusion    [DISCONTINUED] LIDOcaine (cardiac) injection    [DISCONTINUED] propofol (DIPRIVAN) 10 mg/mL infusion    [DISCONTINUED] sodium chloride 0.9% infusion     Current Outpatient Medications on File Prior to Encounter   Medication Sig    acetaminophen (TYLENOL) 500 MG tablet Take 1,000 mg by mouth daily as needed for Pain.    amLODIPine (NORVASC) 10 MG tablet Take 1 tablet (10 mg total) by mouth once daily.    aspirin (ECOTRIN) 81 MG EC tablet Take 81 mg by mouth once daily.      atorvastatin (LIPITOR) 10 MG tablet Take 1 tablet (10 mg total) by mouth once daily.    lanolin/mineral oil/petrolatum (ARTIFICIAL TEARS OPHT) Place 2 drops into both eyes daily as needed (Dry eye).    losartan (COZAAR) 100 MG tablet Take 1 tablet (100 mg total) by mouth once daily.    omeprazole (PRILOSEC) 20 MG capsule Take 20 mg by mouth once daily.    vitamin D (VITAMIN D3) 1000 units Tab Take 1,000 Units by mouth once daily.     Family History       Problem Relation (Age of Onset)    Cancer Mother, Brother    Diabetes Father    Heart attack Son    Heart disease Father, Son    Hypertension Father, Brother    Kidney disease Son    No Known Problems Daughter, Son, Son, Son          Tobacco Use    Smoking status: Never    Smokeless tobacco: Never   Substance and Sexual Activity    Alcohol use: Not Currently     Alcohol/week: 2.0 standard drinks of alcohol     Types: 2 Cans of beer per week     Comment: beer once a month    Drug use: No    Sexual activity: Yes     Partners: Female     Review of Systems   Constitutional:  Negative for chills, fatigue and fever.   HENT:  Negative for sore throat and trouble swallowing.    Eyes:  Negative for photophobia and visual disturbance.   Respiratory:  Negative for cough, shortness of breath and wheezing.    Cardiovascular:  Negative for chest pain, palpitations and leg swelling.   Gastrointestinal:  Negative for abdominal distention, abdominal pain, diarrhea, nausea and vomiting.   Genitourinary:  Negative for dysuria and hematuria.   Musculoskeletal:  Negative for neck pain and neck stiffness.   Skin:  Negative for rash and wound.   Neurological:  Negative for dizziness, seizures, syncope, weakness, light-headedness, numbness and headaches.   Psychiatric/Behavioral:  Negative for confusion and decreased concentration.      Objective:     Vital Signs (Most Recent):  Temp: 97.9 °F (36.6 °C) (08/25/23 1300)  Pulse: (!) 46 (08/25/23 1730)  Resp: 10 (08/25/23 1730)  BP: (!)  156/77 (08/25/23 1656)  SpO2: 96 % (08/25/23 1730) Vital Signs (24h Range):  Temp:  [97.7 °F (36.5 °C)-97.9 °F (36.6 °C)] 97.9 °F (36.6 °C)  Pulse:  [40-62] 46  Resp:  [10-20] 10  SpO2:  [94 %-97 %] 96 %  BP: ()/(56-95) 156/77     Weight: 77.1 kg (170 lb)  Body mass index is 29.18 kg/m².     Physical Exam  Constitutional:       General: He is not in acute distress.     Appearance: He is not toxic-appearing or diaphoretic.   HENT:      Head: Normocephalic and atraumatic.      Nose: Nose normal.   Eyes:      General: No scleral icterus.     Extraocular Movements: Extraocular movements intact.      Pupils: Pupils are equal, round, and reactive to light.   Cardiovascular:      Rate and Rhythm: Bradycardia present. Rhythm irregular.   Pulmonary:      Effort: Pulmonary effort is normal. No respiratory distress.      Breath sounds: No wheezing or rales.   Abdominal:      General: Abdomen is flat. There is no distension.      Palpations: Abdomen is soft.      Tenderness: There is no abdominal tenderness. There is no guarding.   Musculoskeletal:         General: Normal range of motion.      Cervical back: Normal range of motion and neck supple. No rigidity.      Right lower leg: No edema.      Left lower leg: No edema.   Skin:     General: Skin is warm and dry.      Coloration: Skin is not jaundiced.   Neurological:      General: No focal deficit present.      Mental Status: He is alert and oriented to person, place, and time.      Cranial Nerves: No cranial nerve deficit.   Psychiatric:         Mood and Affect: Mood normal.         Behavior: Behavior normal.              CRANIAL NERVES     CN III, IV, VI   Pupils are equal, round, and reactive to light.       Significant Labs: All pertinent labs within the past 24 hours have been reviewed.  CBC:   Recent Labs   Lab 08/25/23  1558   WBC 6.31   HGB 13.6*   HCT 41.4        CMP:   Recent Labs   Lab 08/25/23  1310      K 4.3      CO2 21*   GLU 85   BUN  10   CREATININE 1.4   CALCIUM 8.2*   PROT 6.9   ALBUMIN 3.6   BILITOT 0.7   ALKPHOS 68   AST 47*   ALT 41   ANIONGAP 11       Significant Imaging: I have reviewed all pertinent imaging results/findings within the past 24 hours.

## 2023-08-25 NOTE — SUBJECTIVE & OBJECTIVE
Past Medical History:   Diagnosis Date    BPH (benign prostatic hyperplasia)     BPH with urinary obstruction 8/22/2016    DDD (degenerative disc disease), lumbar     Dyslipidemia     Elevated prostate specific antigen (PSA) 8/22/2016    Fatty liver     GERD (gastroesophageal reflux disease)     Hematuria     History of adenomatous polyp of colon 7/17/2015    History of colonic polyps     Hypertension     Lumbar disc disease     Mild vitamin D deficiency     Renal disorder     S/P TURP 3/9/2020       Past Surgical History:   Procedure Laterality Date    CATARACT EXTRACTION W/  INTRAOCULAR LENS IMPLANT Left     Dr Ruggiero     CATARACT EXTRACTION W/  INTRAOCULAR LENS IMPLANT Right 03/27/16    Dr Ruggiero    CHOLECYSTECTOMY      COLONOSCOPY N/A 6/10/2021    Procedure: COLONOSCOPY;  Surgeon: Moody Morton MD;  Location: [facility] (4TH FLR);  Service: Endoscopy;  Laterality: N/A;  prep ins. emailed -Pt. fully vaccinated.Feb.2021 [facility]    EYE SURGERY      FRACTURE SURGERY      left ankle    LASIK Bilateral 2016    NEUROMA SURGERY Right 2006    TRANSURETHRAL RESECTION OF PROSTATE N/A 3/3/2020    Procedure: TURP (TRANSURETHRAL RESECTION OF PROSTATE);  Surgeon: Vincent King MD;  Location: [facility] 1ST FLR;  Service: Urology;  Laterality: N/A;  2hr       Review of patient's allergies indicates:   Allergen Reactions    No known allergies        Current Facility-Administered Medications on File Prior to Encounter   Medication    [DISCONTINUED] 0.9%  NaCl infusion    [DISCONTINUED] LIDOcaine (cardiac) injection    [DISCONTINUED] propofol (DIPRIVAN) 10 mg/mL infusion    [DISCONTINUED] sodium chloride 0.9% infusion     Current Outpatient Medications on File Prior to Encounter   Medication Sig    acetaminophen (TYLENOL) 500 MG tablet Take 500 mg by mouth every 6 (six) hours as needed for Pain.    amLODIPine (NORVASC) 10 MG tablet Take 1 tablet (10 mg total) by mouth once daily.    aspirin (ECOTRIN) 81 MG EC tablet Take 81 mg by mouth  once daily.     atorvastatin (LIPITOR) 10 MG tablet Take 1 tablet (10 mg total) by mouth once daily.    losartan (COZAAR) 100 MG tablet Take 1 tablet (100 mg total) by mouth once daily.    omeprazole (PRILOSEC) 20 MG capsule Take 20 mg by mouth once daily.    vitamin D (VITAMIN D3) 1000 units Tab Take 1,000 Units by mouth once daily.     Family History       Problem Relation (Age of Onset)    Cancer Mother, Brother    Diabetes Father    Heart attack Son    Heart disease Father, Son    Hypertension Father, Brother    Kidney disease Son    No Known Problems Daughter, Son, Son, Son          Tobacco Use    Smoking status: Never    Smokeless tobacco: Never   Substance and Sexual Activity    Alcohol use: Not Currently     Alcohol/week: 2.0 standard drinks of alcohol     Types: 2 Cans of beer per week     Comment: beer once a month    Drug use: No    Sexual activity: Yes     Partners: Female     Review of Systems   Constitutional: Positive for malaise/fatigue.   Musculoskeletal:  Negative for falls.   Neurological:  Positive for dizziness.   All other systems reviewed and are negative.    Objective:     Vital Signs (Most Recent):  Temp: 97.9 °F (36.6 °C) (08/25/23 1300)  Pulse: (!) 49 (08/25/23 1436)  Resp: 19 (08/25/23 1436)  BP: (!) 133/95 (08/25/23 1410)  SpO2: 96 % (08/25/23 1436) Vital Signs (24h Range):  Temp:  [97.7 °F (36.5 °C)-97.9 °F (36.6 °C)] 97.9 °F (36.6 °C)  Pulse:  [40-52] 49  Resp:  [16-20] 19  SpO2:  [94 %-97 %] 96 %  BP: ()/(56-95) 133/95     Weight: 77.1 kg (170 lb)  Body mass index is 29.18 kg/m².    SpO2: 96 %         Intake/Output Summary (Last 24 hours) at 8/25/2023 1441  Last data filed at 8/25/2023 1411  Gross per 24 hour   Intake --   Output 400 ml   Net -400 ml       Lines/Drains/Airways       Peripheral Intravenous Line  Duration                  Peripheral IV - Single Lumen 08/25/23 1009 20 G Right Antecubital <1 day                     Physical Exam  Constitutional:       General: He is  "not in acute distress.     Appearance: Normal appearance. He is obese. He is not ill-appearing.   HENT:      Head: Normocephalic.      Nose: Nose normal.   Eyes:      Extraocular Movements: Extraocular movements intact.   Cardiovascular:      Rate and Rhythm: Regular rhythm. Bradycardia present.   Pulmonary:      Effort: Pulmonary effort is normal. No respiratory distress.      Breath sounds: No wheezing or rales.   Abdominal:      Palpations: Abdomen is soft.   Musculoskeletal:      Cervical back: Neck supple.      Right lower leg: No edema.      Left lower leg: No edema.   Skin:     General: Skin is warm.   Neurological:      Mental Status: He is alert and oriented to person, place, and time.          Significant Labs: BMP:   Recent Labs   Lab 08/25/23  1310   GLU 85      K 4.3      CO2 21*   BUN 10   CREATININE 1.4   CALCIUM 8.2*   MG 1.9   , CMP   Recent Labs   Lab 08/25/23  1310      K 4.3      CO2 21*   GLU 85   BUN 10   CREATININE 1.4   CALCIUM 8.2*   PROT 6.9   ALBUMIN 3.6   BILITOT 0.7   ALKPHOS 68   AST 47*   ALT 41   ANIONGAP 11   , CBC No results for input(s): "WBC", "HGB", "HCT", "PLT" in the last 48 hours., INR No results for input(s): "INR", "PROTIME" in the last 48 hours., Lipid Panel No results for input(s): "CHOL", "HDL", "LDLCALC", "TRIG", "CHOLHDL" in the last 48 hours., and Troponin   Recent Labs   Lab 08/25/23  1310   TROPONINI 0.010       Significant Imaging:   Reviewed  "

## 2023-08-25 NOTE — ANESTHESIA PREPROCEDURE EVALUATION
08/25/2023  Taran Hernandes is a 82 y.o., male.    Past Medical History:   Diagnosis Date    BPH (benign prostatic hyperplasia)     BPH with urinary obstruction 8/22/2016    DDD (degenerative disc disease), lumbar     Dyslipidemia     Elevated prostate specific antigen (PSA) 8/22/2016    Fatty liver     GERD (gastroesophageal reflux disease)     Hematuria     History of adenomatous polyp of colon 7/17/2015    History of colonic polyps     Hypertension     Lumbar disc disease     Mild vitamin D deficiency     Renal disorder     S/P TURP 3/9/2020     Past Surgical History:   Procedure Laterality Date    CATARACT EXTRACTION W/  INTRAOCULAR LENS IMPLANT Left     Dr Ruggiero     CATARACT EXTRACTION W/  INTRAOCULAR LENS IMPLANT Right 03/27/16    Dr Ruggiero    CHOLECYSTECTOMY      COLONOSCOPY N/A 6/10/2021    Procedure: COLONOSCOPY;  Surgeon: Moody Morton MD;  Location: [facility] (4TH FLR);  Service: Endoscopy;  Laterality: N/A;  prep ins. emailed -Pt. fully vaccinated.Feb.2021 [facility]    EYE SURGERY      FRACTURE SURGERY      left ankle    LASIK Bilateral 2016    NEUROMA SURGERY Right 2006    TRANSURETHRAL RESECTION OF PROSTATE N/A 3/3/2020    Procedure: TURP (TRANSURETHRAL RESECTION OF PROSTATE);  Surgeon: Vincent King MD;  [address]  Service: Urology;  Laterality: N/A;  2hr       Pre-op Assessment    I have reviewed the Patient Summary Reports.     I have reviewed the Nursing Notes. I have reviewed the NPO Status.   I have reviewed the Medications.     Review of Systems  Anesthesia Hx:  No problems with previous Anesthesia  Denies Family Hx of Anesthesia complications.   Denies Personal Hx of Anesthesia complications.   Hematology/Oncology:  Hematology Normal   Oncology Normal     EENT/Dental:EENT/Dental Normal   Cardiovascular:   Hypertension    Pulmonary:  Pulmonary  Normal    Renal/:   Chronic Renal Disease    Hepatic/GI:   Bowel Prep. GERD Liver Disease,    Musculoskeletal:   Arthritis     Neurological:   Neuromuscular Disease,    Endocrine:  Obesity / BMI > 30  Dermatological:  Skin Normal    Psych:  Psychiatric Normal           Physical Exam  General: Well nourished    Airway:  Mallampati: II   Mouth Opening: Normal  TM Distance: Normal  Tongue: Normal  Neck ROM: Normal ROM    Dental:  Intact        Anesthesia Plan  Type of Anesthesia, risks & benefits discussed:    Anesthesia Type: Gen Natural Airway  Intra-op Monitoring Plan: Standard ASA Monitors  Informed Consent: Informed consent signed with the Patient and all parties understand the risks and agree with anesthesia plan.  All questions answered.   ASA Score: 3  Day of Surgery Review of History & Physical: H&P Update referred to the surgeon/provider.I have interviewed and examined the patient. I have reviewed the patient's H&P dated: There are no significant changes.     Ready For Surgery From Anesthesia Perspective.     .

## 2023-08-25 NOTE — PHARMACY MED REC
"  Admission Medication History     The home medication history was taken by Gretchen Leahy.    You may go to "Admission" then "Reconcile Home Medications" tabs to review and/or act upon these items.     The home medication list has been updated by the Pharmacy department.   Please read ALL comments highlighted in yellow.   Please address this information as you see fit.    Feel free to contact us if you have any questions or require assistance.      Medications listed below were obtained from: Patient  Current Outpatient Medications on File Prior to Encounter   Medication Sig    acetaminophen (TYLENOL) 500 MG tablet   Take 1,000 mg by mouth daily as needed for Pain.    amLODIPine (NORVASC) 10 MG tablet   Take 1 tablet (10 mg total) by mouth once daily.    aspirin (ECOTRIN) 81 MG EC tablet   Take 81 mg by mouth once daily.     atorvastatin (LIPITOR) 10 MG tablet   Take 1 tablet (10 mg total) by mouth once daily.    lanolin/mineral oil/petrolatum (ARTIFICIAL TEARS OPHT)   Place 2 drops into both eyes daily as needed (Dry eye).    losartan (COZAAR) 100 MG tablet   Take 1 tablet (100 mg total) by mouth once daily.    omeprazole (PRILOSEC) 20 MG capsule   Take 20 mg by mouth once daily.    vitamin D (VITAMIN D3) 1000 units Tab Take 1,000 Units by mouth once daily.     Potential issues to be addressed PRIOR TO DISCHARGE    NO ISSUES TO DISCLOSE            Gretchen Leahy  EXT [id number]                .          "

## 2023-08-25 NOTE — HPI
Mr Hernandes is a 81 yo M with PMH HTN, HLD presenting to the ED after colonoscopy today for bradycardia. Pt accompanied by his son and wife. He has been feeling dizzy and tired for the past month. No falls, but has caught himself off balance a few times. No syncope, chest pain, SOB, PND, orthopnea, etc. Colonoscopy done today and revealed polyps and diverticulosis. Given Propofol and Lidocaine for procedure.     In the ED, he was hemodynamically stable. HR in the upper 40s in junctional bradycardia. Troponin negative. Cardiology consulted for bradycardia.

## 2023-08-25 NOTE — ASSESSMENT & PLAN NOTE
- Creatinine 1.4 on presentation which appears to be baseline  - avoid nephrotoxic agents as appropriate  - continue to monitor

## 2023-08-26 LAB
ALBUMIN SERPL BCP-MCNC: 3.6 G/DL (ref 3.5–5.2)
ALP SERPL-CCNC: 73 U/L (ref 55–135)
ALT SERPL W/O P-5'-P-CCNC: 36 U/L (ref 10–44)
ANION GAP SERPL CALC-SCNC: 11 MMOL/L (ref 8–16)
ASCENDING AORTA: 3.93 CM
AST SERPL-CCNC: 36 U/L (ref 10–40)
AV INDEX (PROSTH): 0.57
AV MEAN GRADIENT: 6 MMHG
AV PEAK GRADIENT: 10 MMHG
AV VALVE AREA BY VELOCITY RATIO: 1.85 CM²
AV VALVE AREA: 1.77 CM²
AV VELOCITY RATIO: 0.59
BASOPHILS # BLD AUTO: 0.05 K/UL (ref 0–0.2)
BASOPHILS NFR BLD: 0.6 % (ref 0–1.9)
BILIRUB SERPL-MCNC: 0.6 MG/DL (ref 0.1–1)
BSA FOR ECHO PROCEDURE: 1.87 M2
BUN SERPL-MCNC: 12 MG/DL (ref 8–23)
CALCIUM SERPL-MCNC: 8.6 MG/DL (ref 8.7–10.5)
CHLORIDE SERPL-SCNC: 107 MMOL/L (ref 95–110)
CO2 SERPL-SCNC: 20 MMOL/L (ref 23–29)
CREAT SERPL-MCNC: 1.1 MG/DL (ref 0.5–1.4)
CV ECHO LV RWT: 0.28 CM
DIFFERENTIAL METHOD: ABNORMAL
DOP CALC AO PEAK VEL: 1.58 M/S
DOP CALC AO VTI: 39.67 CM
DOP CALC LVOT AREA: 3.1 CM2
DOP CALC LVOT DIAMETER: 1.99 CM
DOP CALC LVOT PEAK VEL: 0.94 M/S
DOP CALC LVOT STROKE VOLUME: 70.13 CM3
DOP CALCLVOT PEAK VEL VTI: 22.56 CM
E WAVE DECELERATION TIME: 221.51 MSEC
E/A RATIO: 0.85
E/E' RATIO: 11.38 M/S
ECHO LV POSTERIOR WALL: 0.74 CM (ref 0.6–1.1)
EOSINOPHIL # BLD AUTO: 0.2 K/UL (ref 0–0.5)
EOSINOPHIL NFR BLD: 2.6 % (ref 0–8)
ERYTHROCYTE [DISTWIDTH] IN BLOOD BY AUTOMATED COUNT: 13.9 % (ref 11.5–14.5)
EST. GFR  (NO RACE VARIABLE): >60 ML/MIN/1.73 M^2
FRACTIONAL SHORTENING: 39 % (ref 28–44)
GLUCOSE SERPL-MCNC: 141 MG/DL (ref 70–110)
HCT VFR BLD AUTO: 41.1 % (ref 40–54)
HGB BLD-MCNC: 13.5 G/DL (ref 14–18)
IMM GRANULOCYTES # BLD AUTO: 0.02 K/UL (ref 0–0.04)
IMM GRANULOCYTES NFR BLD AUTO: 0.3 % (ref 0–0.5)
INTERVENTRICULAR SEPTUM: 0.91 CM (ref 0.6–1.1)
IVRT: 125.59 MSEC
LA MAJOR: 5.4 CM
LA MINOR: 5.41 CM
LA WIDTH: 4.2 CM
LEFT ATRIUM SIZE: 4.2 CM
LEFT ATRIUM VOLUME INDEX MOD: 22.4 ML/M2
LEFT ATRIUM VOLUME INDEX: 44.3 ML/M2
LEFT ATRIUM VOLUME MOD: 40.94 CM3
LEFT ATRIUM VOLUME: 81.04 CM3
LEFT INTERNAL DIMENSION IN SYSTOLE: 3.17 CM (ref 2.1–4)
LEFT VENTRICLE DIASTOLIC VOLUME INDEX: 70.97 ML/M2
LEFT VENTRICLE DIASTOLIC VOLUME: 129.88 ML
LEFT VENTRICLE MASS INDEX: 83 G/M2
LEFT VENTRICLE SYSTOLIC VOLUME INDEX: 21.9 ML/M2
LEFT VENTRICLE SYSTOLIC VOLUME: 40.07 ML
LEFT VENTRICULAR INTERNAL DIMENSION IN DIASTOLE: 5.21 CM (ref 3.5–6)
LEFT VENTRICULAR MASS: 151.53 G
LV LATERAL E/E' RATIO: 11.38 M/S
LV SEPTAL E/E' RATIO: 11.38 M/S
LYMPHOCYTES # BLD AUTO: 2.4 K/UL (ref 1–4.8)
LYMPHOCYTES NFR BLD: 30.3 % (ref 18–48)
MAGNESIUM SERPL-MCNC: 1.8 MG/DL (ref 1.6–2.6)
MCH RBC QN AUTO: 27.8 PG (ref 27–31)
MCHC RBC AUTO-ENTMCNC: 32.8 G/DL (ref 32–36)
MCV RBC AUTO: 85 FL (ref 82–98)
MONOCYTES # BLD AUTO: 0.5 K/UL (ref 0.3–1)
MONOCYTES NFR BLD: 6.9 % (ref 4–15)
MV PEAK A VEL: 1.07 M/S
MV PEAK E VEL: 0.91 M/S
MV STENOSIS PRESSURE HALF TIME: 64.24 MS
MV VALVE AREA P 1/2 METHOD: 3.42 CM2
NEUTROPHILS # BLD AUTO: 4.6 K/UL (ref 1.8–7.7)
NEUTROPHILS NFR BLD: 59.3 % (ref 38–73)
NRBC BLD-RTO: 0 /100 WBC
PHOSPHATE SERPL-MCNC: 2.8 MG/DL (ref 2.7–4.5)
PISA TR MAX VEL: 2.88 M/S
PLATELET # BLD AUTO: 194 K/UL (ref 150–450)
PMV BLD AUTO: 9.8 FL (ref 9.2–12.9)
POTASSIUM SERPL-SCNC: 3.4 MMOL/L (ref 3.5–5.1)
PROT SERPL-MCNC: 7.2 G/DL (ref 6–8.4)
RA MAJOR: 4.33 CM
RA PRESSURE ESTIMATED: 3 MMHG
RA WIDTH: 2.99 CM
RBC # BLD AUTO: 4.85 M/UL (ref 4.6–6.2)
RIGHT VENTRICULAR END-DIASTOLIC DIMENSION: 3.03 CM
RV TB RVSP: 6 MMHG
SINUS: 2.7 CM
SODIUM SERPL-SCNC: 138 MMOL/L (ref 136–145)
STJ: 2.56 CM
TDI LATERAL: 0.08 M/S
TDI SEPTAL: 0.08 M/S
TDI: 0.08 M/S
TR MAX PG: 33 MMHG
TRICUSPID ANNULAR PLANE SYSTOLIC EXCURSION: 2.06 CM
TV REST PULMONARY ARTERY PRESSURE: 36 MMHG
WBC # BLD AUTO: 7.82 K/UL (ref 3.9–12.7)
Z-SCORE OF LEFT VENTRICULAR DIMENSION IN END DIASTOLE: 0.27
Z-SCORE OF LEFT VENTRICULAR DIMENSION IN END SYSTOLE: 0.1

## 2023-08-26 PROCEDURE — 99239 HOSP IP/OBS DSCHRG MGMT >30: CPT | Mod: HCNC,,, | Performed by: STUDENT IN AN ORGANIZED HEALTH CARE EDUCATION/TRAINING PROGRAM

## 2023-08-26 PROCEDURE — 93010 EKG 12-LEAD: ICD-10-PCS | Mod: HCNC,,, | Performed by: INTERNAL MEDICINE

## 2023-08-26 PROCEDURE — 99213 PR OFFICE/OUTPT VISIT, EST, LEVL III, 20-29 MIN: ICD-10-PCS | Mod: HCNC,GC,, | Performed by: INTERNAL MEDICINE

## 2023-08-26 PROCEDURE — 96372 THER/PROPH/DIAG INJ SC/IM: CPT | Performed by: FAMILY MEDICINE

## 2023-08-26 PROCEDURE — 63600175 PHARM REV CODE 636 W HCPCS: Mod: HCNC | Performed by: FAMILY MEDICINE

## 2023-08-26 PROCEDURE — 99213 OFFICE O/P EST LOW 20 MIN: CPT | Mod: HCNC,GC,, | Performed by: INTERNAL MEDICINE

## 2023-08-26 PROCEDURE — 36415 COLL VENOUS BLD VENIPUNCTURE: CPT | Mod: HCNC | Performed by: FAMILY MEDICINE

## 2023-08-26 PROCEDURE — 99213 OFFICE O/P EST LOW 20 MIN: CPT | Mod: HCNC,GC,, | Performed by: STUDENT IN AN ORGANIZED HEALTH CARE EDUCATION/TRAINING PROGRAM

## 2023-08-26 PROCEDURE — 63600175 PHARM REV CODE 636 W HCPCS: Mod: HCNC | Performed by: STUDENT IN AN ORGANIZED HEALTH CARE EDUCATION/TRAINING PROGRAM

## 2023-08-26 PROCEDURE — 83735 ASSAY OF MAGNESIUM: CPT | Mod: HCNC | Performed by: FAMILY MEDICINE

## 2023-08-26 PROCEDURE — 96365 THER/PROPH/DIAG IV INF INIT: CPT | Mod: 59

## 2023-08-26 PROCEDURE — 97530 THERAPEUTIC ACTIVITIES: CPT | Mod: HCNC

## 2023-08-26 PROCEDURE — 93010 ELECTROCARDIOGRAM REPORT: CPT | Mod: HCNC,,, | Performed by: INTERNAL MEDICINE

## 2023-08-26 PROCEDURE — 84100 ASSAY OF PHOSPHORUS: CPT | Mod: HCNC | Performed by: FAMILY MEDICINE

## 2023-08-26 PROCEDURE — 97161 PT EVAL LOW COMPLEX 20 MIN: CPT | Mod: HCNC

## 2023-08-26 PROCEDURE — 25000003 PHARM REV CODE 250: Mod: HCNC | Performed by: STUDENT IN AN ORGANIZED HEALTH CARE EDUCATION/TRAINING PROGRAM

## 2023-08-26 PROCEDURE — 85025 COMPLETE CBC W/AUTO DIFF WBC: CPT | Mod: HCNC | Performed by: FAMILY MEDICINE

## 2023-08-26 PROCEDURE — 99213 PR OFFICE/OUTPT VISIT, EST, LEVL III, 20-29 MIN: ICD-10-PCS | Mod: HCNC,GC,, | Performed by: STUDENT IN AN ORGANIZED HEALTH CARE EDUCATION/TRAINING PROGRAM

## 2023-08-26 PROCEDURE — G0378 HOSPITAL OBSERVATION PER HR: HCPCS | Mod: HCNC

## 2023-08-26 PROCEDURE — 25000003 PHARM REV CODE 250: Mod: HCNC | Performed by: FAMILY MEDICINE

## 2023-08-26 PROCEDURE — 93005 ELECTROCARDIOGRAM TRACING: CPT | Mod: HCNC

## 2023-08-26 PROCEDURE — 97116 GAIT TRAINING THERAPY: CPT | Mod: HCNC

## 2023-08-26 PROCEDURE — 97165 OT EVAL LOW COMPLEX 30 MIN: CPT | Mod: HCNC

## 2023-08-26 PROCEDURE — 80053 COMPREHEN METABOLIC PANEL: CPT | Mod: HCNC | Performed by: FAMILY MEDICINE

## 2023-08-26 PROCEDURE — 99239 PR HOSPITAL DISCHARGE DAY,>30 MIN: ICD-10-PCS | Mod: HCNC,,, | Performed by: STUDENT IN AN ORGANIZED HEALTH CARE EDUCATION/TRAINING PROGRAM

## 2023-08-26 RX ORDER — MAGNESIUM SULFATE 1 G/100ML
1 INJECTION INTRAVENOUS ONCE
Status: COMPLETED | OUTPATIENT
Start: 2023-08-26 | End: 2023-08-26

## 2023-08-26 RX ORDER — POTASSIUM CHLORIDE 750 MG/1
30 CAPSULE, EXTENDED RELEASE ORAL
Status: COMPLETED | OUTPATIENT
Start: 2023-08-26 | End: 2023-08-26

## 2023-08-26 RX ADMIN — POTASSIUM CHLORIDE 30 MEQ: 10 CAPSULE, COATED, EXTENDED RELEASE ORAL at 11:08

## 2023-08-26 RX ADMIN — ASPIRIN 81 MG: 81 TABLET, COATED ORAL at 09:08

## 2023-08-26 RX ADMIN — HEPARIN SODIUM 5000 UNITS: 5000 INJECTION INTRAVENOUS; SUBCUTANEOUS at 05:08

## 2023-08-26 RX ADMIN — MAGNESIUM SULFATE HEPTAHYDRATE 1 G: 500 INJECTION, SOLUTION INTRAMUSCULAR; INTRAVENOUS at 09:08

## 2023-08-26 RX ADMIN — ATORVASTATIN CALCIUM 10 MG: 10 TABLET, FILM COATED ORAL at 09:08

## 2023-08-26 RX ADMIN — PANTOPRAZOLE SODIUM 40 MG: 40 TABLET, DELAYED RELEASE ORAL at 09:08

## 2023-08-26 RX ADMIN — POTASSIUM CHLORIDE 30 MEQ: 10 CAPSULE, COATED, EXTENDED RELEASE ORAL at 09:08

## 2023-08-26 NOTE — SUBJECTIVE & OBJECTIVE
Interval History: No acute events overnight. Telemetry showing intermittent junctional bradycardia with sinus bradycardia with HR 40-50s. BP stable. He still has symptoms c/w vertigo when turning his head to the R, but also feels lightheaded when sitting up in bed. He hasn't ambulated yet. No CP, SOB. Dennis in place for urinary retention.     Review of Systems   Constitutional: Positive for malaise/fatigue.   Neurological:  Positive for dizziness.     Objective:     Vital Signs (Most Recent):  Temp: 98.2 °F (36.8 °C) (08/26/23 0807)  Pulse: (!) 48 (08/26/23 0807)  Resp: 18 (08/26/23 0807)  BP: (!) 155/80 (08/26/23 0807)  SpO2: (!) 92 % (08/26/23 0807) Vital Signs (24h Range):  Temp:  [97.2 °F (36.2 °C)-98.2 °F (36.8 °C)] 98.2 °F (36.8 °C)  Pulse:  [40-64] 48  Resp:  [10-20] 18  SpO2:  [92 %-98 %] 92 %  BP: ()/(56-95) 155/80     Weight: 77.1 kg (170 lb)  Body mass index is 29.18 kg/m².     SpO2: (!) 92 %         Intake/Output Summary (Last 24 hours) at 8/26/2023 0842  Last data filed at 8/26/2023 0436  Gross per 24 hour   Intake 240 ml   Output 2950 ml   Net -2710 ml       Lines/Drains/Airways       Drain  Duration                  Urethral Catheter 08/25/23 1629 <1 day              Peripheral Intravenous Line  Duration                  Peripheral IV - Single Lumen 08/25/23 1009 20 G Right Antecubital <1 day                       Physical Exam  Constitutional:       General: He is not in acute distress.     Appearance: Normal appearance. He is not ill-appearing.   HENT:      Head: Normocephalic.      Mouth/Throat:      Mouth: Mucous membranes are moist.   Eyes:      Extraocular Movements: Extraocular movements intact.   Cardiovascular:      Rate and Rhythm: Regular rhythm. Bradycardia present.   Pulmonary:      Effort: Pulmonary effort is normal. No respiratory distress.      Breath sounds: No wheezing.   Abdominal:      Palpations: Abdomen is soft.   Genitourinary:     Comments: Dennis in  "place  Musculoskeletal:      Cervical back: Neck supple.      Right lower leg: No edema.      Left lower leg: No edema.   Skin:     General: Skin is warm.   Neurological:      Mental Status: He is alert and oriented to person, place, and time.            Significant Labs: BMP:   Recent Labs   Lab 08/25/23  1310 08/26/23  0514   GLU 85 141*    138   K 4.3 3.4*    107   CO2 21* 20*   BUN 10 12   CREATININE 1.4 1.1   CALCIUM 8.2* 8.6*   MG 1.9 1.8   , CMP   Recent Labs   Lab 08/25/23  1310 08/26/23  0514    138   K 4.3 3.4*    107   CO2 21* 20*   GLU 85 141*   BUN 10 12   CREATININE 1.4 1.1   CALCIUM 8.2* 8.6*   PROT 6.9 7.2   ALBUMIN 3.6 3.6   BILITOT 0.7 0.6   ALKPHOS 68 73   AST 47* 36   ALT 41 36   ANIONGAP 11 11   , CBC   Recent Labs   Lab 08/25/23  1558 08/26/23  0514   WBC 6.31 7.82   HGB 13.6* 13.5*   HCT 41.4 41.1    194   , INR No results for input(s): "INR", "PROTIME" in the last 48 hours., Lipid Panel No results for input(s): "CHOL", "HDL", "LDLCALC", "TRIG", "CHOLHDL" in the last 48 hours., and Troponin   Recent Labs   Lab 08/25/23  1310   TROPONINI 0.010       Significant Imaging:   Reviewed  "

## 2023-08-26 NOTE — SUBJECTIVE & OBJECTIVE
Past Medical History:   Diagnosis Date    BPH (benign prostatic hyperplasia)     BPH with urinary obstruction 8/22/2016    DDD (degenerative disc disease), lumbar     Dyslipidemia     Elevated prostate specific antigen (PSA) 8/22/2016    Fatty liver     GERD (gastroesophageal reflux disease)     Hematuria     History of adenomatous polyp of colon 7/17/2015    History of colonic polyps     Hypertension     Lumbar disc disease     Mild vitamin D deficiency     Renal disorder     S/P TURP 3/9/2020       Past Surgical History:   Procedure Laterality Date    CATARACT EXTRACTION W/  INTRAOCULAR LENS IMPLANT Left     Dr Ruggiero     CATARACT EXTRACTION W/  INTRAOCULAR LENS IMPLANT Right 03/27/16    Dr Ruggiero    CHOLECYSTECTOMY      COLONOSCOPY N/A 6/10/2021    Procedure: COLONOSCOPY;  Surgeon: Moody Morton MD;  Location: [facility] (4TH FLR);  Service: Endoscopy;  Laterality: N/A;  prep ins. emailed -Pt. fully vaccinated.Feb.2021 EC    COLONOSCOPY N/A 8/25/2023    Procedure: COLONOSCOPY;  Surgeon: Moody Morton MD;  Location: [facility] (4TH FLR);  Service: Endoscopy;  Laterality: N/A;  Referral: KAITLYN CASTRO  [place] states will most likely not answer phone since he can't hear, uses [employer]  [employer]   Inst portal and handed to pt    EYE SURGERY      FRACTURE SURGERY      left ankle    LASIK Bilateral 2016    NEUROMA SURGERY Right 2006    TRANSURETHRAL RESECTION OF PROSTATE N/A 3/3/2020    Procedure: TURP (TRANSURETHRAL RESECTION OF PROSTATE);  Surgeon: Vincent King MD;  [facility];  Service: Urology;  Laterality: N/A;  2hr       Review of patient's allergies indicates:   Allergen Reactions    No known allergies        Current Facility-Administered Medications on File Prior to Encounter   Medication    [DISCONTINUED] 0.9%  NaCl infusion    [DISCONTINUED] LIDOcaine (cardiac) injection    [DISCONTINUED] propofol (DIPRIVAN) 10 mg/mL infusion    [DISCONTINUED] sodium chloride 0.9% infusion      Current Outpatient Medications on File Prior to Encounter   Medication Sig    acetaminophen (TYLENOL) 500 MG tablet Take 1,000 mg by mouth daily as needed for Pain.    amLODIPine (NORVASC) 10 MG tablet Take 1 tablet (10 mg total) by mouth once daily.    aspirin (ECOTRIN) 81 MG EC tablet Take 81 mg by mouth once daily.     atorvastatin (LIPITOR) 10 MG tablet Take 1 tablet (10 mg total) by mouth once daily.    lanolin/mineral oil/petrolatum (ARTIFICIAL TEARS OPHT) Place 2 drops into both eyes daily as needed (Dry eye).    losartan (COZAAR) 100 MG tablet Take 1 tablet (100 mg total) by mouth once daily.    omeprazole (PRILOSEC) 20 MG capsule Take 20 mg by mouth once daily.    vitamin D (VITAMIN D3) 1000 units Tab Take 1,000 Units by mouth once daily.     Family History       Problem Relation (Age of Onset)    Cancer Mother, Brother    Diabetes Father    Heart attack Son    Heart disease Father, Son    Hypertension Father, Brother    Kidney disease Son    No Known Problems Daughter, Son, Son, Son          Tobacco Use    Smoking status: Never    Smokeless tobacco: Never   Substance and Sexual Activity    Alcohol use: Not Currently     Alcohol/week: 2.0 standard drinks of alcohol     Types: 2 Cans of beer per week     Comment: beer once a month    Drug use: No    Sexual activity: Yes     Partners: Female     Review of Systems   Constitutional: Positive for malaise/fatigue.   Cardiovascular: Negative.  Negative for chest pain, irregular heartbeat and leg swelling.   Musculoskeletal:  Negative for falls.   Neurological:  Positive for dizziness.   All other systems reviewed and are negative.    Objective:     Vital Signs (Most Recent):  Temp: 98.2 °F (36.8 °C) (08/26/23 0807)  Pulse: 69 (08/26/23 0900)  Resp: 18 (08/26/23 0900)  BP: (!) 178/84 (08/26/23 0900)  SpO2: (!) 94 % (08/26/23 0900) Vital Signs (24h Range):  Temp:  [97.2 °F (36.2 °C)-98.2 °F (36.8 °C)] 98.2 °F (36.8 °C)  Pulse:  [40-69] 69  Resp:  [10-20]  "18  SpO2:  [92 %-98 %] 94 %  BP: ()/(56-95) 178/84       Weight: 77.1 kg (170 lb)  Body mass index is 29.18 kg/m².    SpO2: (!) 94 %        Physical Exam  Constitutional:       General: He is not in acute distress.     Appearance: Normal appearance. He is not ill-appearing.   HENT:      Head: Normocephalic.      Mouth/Throat:      Mouth: Mucous membranes are moist.   Eyes:      Extraocular Movements: Extraocular movements intact.   Cardiovascular:      Rate and Rhythm: Regular rhythm. Bradycardia present.   Pulmonary:      Effort: Pulmonary effort is normal. No respiratory distress.   Abdominal:      Palpations: Abdomen is soft.   Musculoskeletal:      Right lower leg: No edema.      Left lower leg: No edema.   Skin:     General: Skin is warm.   Neurological:      Mental Status: He is alert and oriented to person, place, and time. Mental status is at baseline.   Psychiatric:         Mood and Affect: Mood normal.         Behavior: Behavior normal.         Thought Content: Thought content normal.            Significant Labs: BMP:   Recent Labs   Lab 08/25/23  1310 08/26/23  0514   GLU 85 141*    138   K 4.3 3.4*    107   CO2 21* 20*   BUN 10 12   CREATININE 1.4 1.1   CALCIUM 8.2* 8.6*   MG 1.9 1.8   , CMP:   Recent Labs   Lab 08/25/23  1310 08/26/23  0514    138   K 4.3 3.4*    107   CO2 21* 20*   GLU 85 141*   BUN 10 12   CREATININE 1.4 1.1   CALCIUM 8.2* 8.6*   PROT 6.9 7.2   ALBUMIN 3.6 3.6   BILITOT 0.7 0.6   ALKPHOS 68 73   AST 47* 36   ALT 41 36   ANIONGAP 11 11   , CBC:   Recent Labs   Lab 08/25/23  1558 08/26/23  0514   WBC 6.31 7.82   HGB 13.6* 13.5*   HCT 41.4 41.1    194   , and INR: No results for input(s): "INR", "PROTIME" in the last 48 hours.    Significant Imaging: reviewed  "

## 2023-08-26 NOTE — PROGRESS NOTES
[facility] - Cardiology Stepdown  Cardiology  Progress Note    Patient Name: Taran Hernandes  MRN: [id number]  Admission Date: 8/25/2023  Hospital Length of Stay: 0 days  Code Status: Full Code   Attending Physician: Milind Espinal MD   Primary Care Physician: Rosita Rios MD  Expected Discharge Date:   Principal Problem:Bradycardia    Subjective:     Interval History: No acute events overnight. Telemetry showing intermittent junctional bradycardia with sinus bradycardia with HR 40-50s. BP stable. He still has symptoms c/w vertigo when turning his head to the R, but also feels lightheaded when sitting up in bed. He hasn't ambulated yet. No CP, SOB. Dennis in place for urinary retention.     Review of Systems   Constitutional: Positive for malaise/fatigue.   Neurological:  Positive for dizziness.     Objective:     Vital Signs (Most Recent):  Temp: 98.2 °F (36.8 °C) (08/26/23 0807)  Pulse: (!) 48 (08/26/23 0807)  Resp: 18 (08/26/23 0807)  BP: (!) 155/80 (08/26/23 0807)  SpO2: (!) 92 % (08/26/23 0807) Vital Signs (24h Range):  Temp:  [97.2 °F (36.2 °C)-98.2 °F (36.8 °C)] 98.2 °F (36.8 °C)  Pulse:  [40-64] 48  Resp:  [10-20] 18  SpO2:  [92 %-98 %] 92 %  BP: ()/(56-95) 155/80     Weight: 77.1 kg (170 lb)  Body mass index is 29.18 kg/m².     SpO2: (!) 92 %         Intake/Output Summary (Last 24 hours) at 8/26/2023 0842  Last data filed at 8/26/2023 0436  Gross per 24 hour   Intake 240 ml   Output 2950 ml   Net -2710 ml       Lines/Drains/Airways       Drain  Duration                  Urethral Catheter 08/25/23 1629 <1 day              Peripheral Intravenous Line  Duration                  Peripheral IV - Single Lumen 08/25/23 1009 20 G Right Antecubital <1 day                       Physical Exam  Constitutional:       General: He is not in acute distress.     Appearance: Normal appearance. He is not ill-appearing.   HENT:      Head: Normocephalic.      Mouth/Throat:      Mouth: Mucous membranes are moist.  "  Eyes:      Extraocular Movements: Extraocular movements intact.   Cardiovascular:      Rate and Rhythm: Regular rhythm. Bradycardia present.   Pulmonary:      Effort: Pulmonary effort is normal. No respiratory distress.      Breath sounds: No wheezing.   Abdominal:      Palpations: Abdomen is soft.   Genitourinary:     Comments: Dennis in place  Musculoskeletal:      Cervical back: Neck supple.      Right lower leg: No edema.      Left lower leg: No edema.   Skin:     General: Skin is warm.   Neurological:      Mental Status: He is alert and oriented to person, place, and time.            Significant Labs: BMP:   Recent Labs   Lab 08/25/23  1310 08/26/23  0514   GLU 85 141*    138   K 4.3 3.4*    107   CO2 21* 20*   BUN 10 12   CREATININE 1.4 1.1   CALCIUM 8.2* 8.6*   MG 1.9 1.8   , CMP   Recent Labs   Lab 08/25/23  1310 08/26/23  0514    138   K 4.3 3.4*    107   CO2 21* 20*   GLU 85 141*   BUN 10 12   CREATININE 1.4 1.1   CALCIUM 8.2* 8.6*   PROT 6.9 7.2   ALBUMIN 3.6 3.6   BILITOT 0.7 0.6   ALKPHOS 68 73   AST 47* 36   ALT 41 36   ANIONGAP 11 11   , CBC   Recent Labs   Lab 08/25/23  1558 08/26/23  0514   WBC 6.31 7.82   HGB 13.6* 13.5*   HCT 41.4 41.1    194   , INR No results for input(s): "INR", "PROTIME" in the last 48 hours., Lipid Panel No results for input(s): "CHOL", "HDL", "LDLCALC", "TRIG", "CHOLHDL" in the last 48 hours., and Troponin   Recent Labs   Lab 08/25/23  1310   TROPONINI 0.010       Significant Imaging:   Reviewed    Assessment and Plan:       * Bradycardia  Noted to be in a narrow-complex, junctional bradycardia after colonoscopy; possibly anesthetic-induced.  Noted to have fatigue, dizziness x1 month; no additional s/s angina or heart failure.  HR improved to mid 60s with exercise in bed with transient restoration of NSR.  Still in intermittent junctional bradycardia with sinus bradycardia.     Hold all AVN blocking agents and hold home antihypertensives until " orthostatics obtained and patient ambulates  TSH WNL  Check TTE  Monitor on telemetry, repeat EKG this morning  K>4, Mag>2  No indication for pacing at this time, but will go ahead and consult EP for potential need for PPM given symptomatic bradycardia        VTE Risk Mitigation (From admission, onward)           Ordered     heparin (porcine) injection 5,000 Units  Every 8 hours         08/25/23 1722     IP VTE HIGH RISK PATIENT  Once         08/25/23 1722     Place sequential compression device  Until discontinued         08/25/23 1722     Place sequential compression device  Until discontinued         08/25/23 1650                  Case discussed with Dr Veronica Paredes MD.     Ethan Ziegler MD  Cardiology  [facility] - Cardiology Stepdown

## 2023-08-26 NOTE — PLAN OF CARE
Problem: Occupational Therapy  Goal: Occupational Therapy Goal  Description: Pt is currently functioning at their baseline in ADLs and functional mobility. Therefore, further skilled OT intervention is not warranted at this time. Pt is appropriate to discharge home. Please re-consult if pt's functional status changes.   Outcome: Met   OT eval complete

## 2023-08-26 NOTE — PLAN OF CARE
[facility] - Cardiology Stepdown  Discharge Assessment    Primary Care Provider: Rosita Rios MD     CM at bedside to discuss discharge planning assessment.   Patient lives with his wife in a first-floor apartment.   Independent with ADL and does not use medical equipment.   Explained CM services during patient's stay in hospital.   My Health packet discussed and left with patient.     Discharge Assessment (most recent)       BRIEF DISCHARGE ASSESSMENT - 08/26/23 1010          Discharge Planning    Assessment Type Discharge Planning Brief Assessment     Resource/Environmental Concerns none     Support Systems Spouse/significant other     Equipment Currently Used at Home none     Current Living Arrangements apartment     Patient/Family Anticipates Transition to home with family     Patient/Family Anticipated Services at Transition none     DME Needed Upon Discharge  --   TBD    Discharge Plan A Home with family     Discharge Plan B Home Health        Physical Activity    On average, how many days per week do you engage in moderate to strenuous exercise (like a brisk walk)? 3 days     On average, how many minutes do you engage in exercise at this level? 120 min        Financial Resource Strain    How hard is it for you to pay for the very basics like food, housing, medical care, and heating? Not hard at all        Housing Stability    In the last 12 months, was there a time when you were not able to pay the mortgage or rent on time? No     In the last 12 months, how many places have you lived? 1     In the last 12 months, was there a time when you did not have a steady place to sleep or slept in a shelter (including now)? No        Transportation Needs    In the past 12 months, has lack of transportation kept you from medical appointments or from getting medications? No     In the past 12 months, has lack of transportation kept you from meetings, work, or from getting things needed for daily living? No         Food Insecurity    Within the past 12 months, you worried that your food would run out before you got the money to buy more. Never true     Within the past 12 months, the food you bought just didn't last and you didn't have money to get more. Never true        Stress    Do you feel stress - tense, restless, nervous, or anxious, or unable to sleep at night because your mind is troubled all the time - these days? Not at all        Social Connections    In a typical week, how many times do you talk on the phone with family, friends, or neighbors? More than three times a week     How often do you get together with friends or relatives? More than three times a week     How often do you attend Temple or Pentecostalism services? Never     Do you belong to any clubs or organizations such as Temple groups, unions, fraternal or athletic groups, or school groups? No     How often do you attend meetings of the clubs or organizations you belong to? Never     Are you , , , , never , or living with a partner?         Alcohol Use    Q1: How often do you have a drink containing alcohol? Never     Q2: How many drinks containing alcohol do you have on a typical day when you are drinking? Patient does not drink     Q3: How often do you have six or more drinks on one occasion? Never

## 2023-08-26 NOTE — ASSESSMENT & PLAN NOTE
Mr Hernandes is a 83 yo M with PMH HTN, HLD presenting to the ED after colonoscopy for bradycardia for which he received glycopyrrolate following post-anesthesia.  ECG at that time showed junctional bradycardia. Patient reports occasional vertigo like symptoms that appear c/w with BPPV. Tele reviewed this AM shows NSR with HR now in the 60s and occasional PACs.    - Bradycardia likely medication induced from anesthesia. Patient is not a candidate for pacemaker at this time.   - Will plan for outpatient 48 hr holter monitoring. Counseled patient to keep a diary of his symptoms.   - Patient can be discharged today from an EP perspective.

## 2023-08-26 NOTE — DISCHARGE SUMMARY
Discharge Summary  Hospital Medicine  [facility]      Attending Physician on Discharge: Milind Espinal MD  [facility] Medicine Team: [facility]  Date of Admission:  8/25/2023     Date of Discharge:  8/26/2023  Code status: Full Code    Active Hospital Problems    Diagnosis  POA    *Bradycardia [R00.1]  Unknown    CKD (chronic kidney disease), stage III [N18.30]  Yes    Hypertension [I10]  Yes    Hyperlipidemia [E78.5]  Yes      Resolved Hospital Problems   No resolved problems to display.       Consults: Cardiology, EP     History of Present Illness:  82-year-old male history of hypertension, hyperlipidemia, presenting to the emergency department for bradycardia.  Patient was in endoscopy receiving a colonoscopy in which he received propofol lidocaine.  Patient was bradycardic at heart rate of 30s given glycopyrrolate x2 with minimal improvement from heart rate in the 30s to the high 50s.  Postprocedure patient was bradycardic and brought down for continued monitoring and assessment and evaluation of bradycardia.  Patient reports over the past month he has had repeated episodes of dizziness when which he feels the room is spinning and he is lightheaded.  Son is also at bedside and reports that patient has been more fatigued over the last couple months.  Patient denies any cardiac history.  Patient denies any actual episodes of syncope, chest pain, shortness of breath.  Patient reports current mild dizziness but no other symptoms at this time.     In the ED patient afebrile and hemodynamically stable saturating well on room air. HR in 40-50's. EKG with junctional rhythm bradycardia. Patient denies symptoms at this time at rest. Denies dizziness, shortness of breath, chest pain, palpitations, nausea, vomiting, abdominal pain, confusion. Cardiology consulted and patient admitted to the care of medicine for further evaluation and management.    Hospital Course:  HR remained in 40s, symptoms  "found to likely be BPPV, referred to vestibular PT. EP recommended holter for 48 hours and follow up with cardiology.        Laboratory Values:  Recent Labs   Lab 08/25/23  1558 08/26/23  0514   WBC 6.31 7.82   HGB 13.6* 13.5*   HCT 41.4 41.1    194     Recent Labs   Lab 08/25/23  1310 08/26/23  0514    138   K 4.3 3.4*    107   CO2 21* 20*   BUN 10 12   CREATININE 1.4 1.1   GLU 85 141*   CALCIUM 8.2* 8.6*   MG 1.9 1.8   PHOS 3.3 2.8     Recent Labs   Lab 08/25/23  1310 08/26/23  0514   ALKPHOS 68 73   ALT 41 36   AST 47* 36   ALBUMIN 3.6 3.6   PROT 6.9 7.2   BILITOT 0.7 0.6      No results for input(s): "POCTGLUCOSE" in the last 168 hours.  Recent Labs     08/25/23  1310   TROPONINI 0.010         Microbiology:  Microbiology Results (last 7 days)       ** No results found for the last 168 hours. **            Imaging:  Imaging Results              X-Ray Chest AP Portable (Final result)  Result time 08/25/23 13:22:56      Final result by Bret Dickson MD (08/25/23 13:22:56)                   Impression:      No acute cardiopulmonary disease.  Allowing for difference in technique, there probably has been no significant change in the cardiopulmonary status.      Electronically signed by: Bret Dickson MD  Date:    08/25/2023  Time:    13:22               Narrative:    EXAMINATION:  XR CHEST AP PORTABLE    CLINICAL HISTORY:  Bradycardia, unspecified    TECHNIQUE:  Single frontal view of the chest was performed.    COMPARISON:  12/26/2009    FINDINGS:  Cardiac silhouette is magnified by portable AP technique.  The heart appears to be near the upper limits of normal size.  Tortuous thoracic aorta and brachiocephalic vessels with atherosclerotic calcification in the wall of the aortic arch.  Pulmonary vascularity does not appear congested.  Lungs are satisfactorily expanded and appear free of active disease.  No pleural fluid or pneumothorax.  Skeletal structures appear intact.                           "                Cardiac:  ECG Results              EKG 12-lead (Final result)  Result time 08/25/23 17:47:26      [facility] (08/25/23 17:47:26)                   Narrative:    Test Reason : R00.1,    Vent. Rate : 054 BPM     Atrial Rate : 100 BPM     P-R Int : 000 ms          QRS Dur : 078 ms      QT Int : 444 ms       P-R-T Axes : 000 003 -09 degrees     QTc Int : 421 ms    Junctional rhythm  Nonspecific T wave abnormality  Abnormal ECG  When compared with ECG of 21-FEB-2020 14:22,  No significant change was found  Confirmed by Veronica Paredes MD ([id number]) on 8/25/2023 5:47:18 PM    Referred By: OLU   SELF           Confirmed By:Veronica Paredes MD                                     EKG 12-lead (Final result)  Result time 08/25/23 17:52:02      Final result by Interface, Lab In [facility] (08/25/23 17:52:02)                   Narrative:    Test Reason : R00.1,    Vent. Rate : 052 BPM     Atrial Rate : 000 BPM     P-R Int : 000 ms          QRS Dur : 086 ms      QT Int : 456 ms       P-R-T Axes : 000 -10 -02 degrees     QTc Int : 424 ms    Junctional rhythm  Abnormal ECG  No previous ECGs available  Confirmed by Veronica Paredes MD ([id number]) on 8/25/2023 5:51:59 PM    Referred By: OLU   SELF           Confirmed By:Veronica Paredes MD                                    Results for orders placed during the hospital encounter of 08/25/23    Echo    Interpretation Summary    Left Ventricle: The left ventricle is normal in size. Ventricular mass is normal. Normal wall thickness. Normal wall motion. There is normal systolic function with a visually estimated ejection fraction of 60 - 65%. Grade I diastolic dysfunction.    Left Atrium: Left atrium is moderately dilated.    Right Ventricle: Normal right ventricular cavity size. Wall thickness is normal. Right ventricle wall motion  is normal. Systolic function is normal.    Aortic Valve: There is mild aortic valve sclerosis. There is mild aortic  regurgitation.    Tricuspid Valve: There is mild regurgitation.    Pulmonary Artery: The estimated pulmonary artery systolic pressure is 36 mmHg.    IVC/SVC: Normal venous pressure at 3 mmHg.    Echolucent, round structure in the liver suggestive of liver cyst. Clinical correlation is required.        Procedures:   * No surgery found *        Discharge Medication List as of 8/26/2023  1:39 PM        CONTINUE these medications which have NOT CHANGED    Details   acetaminophen (TYLENOL) 500 MG tablet Take 1,000 mg by mouth daily as needed for Pain., Historical Med      amLODIPine (NORVASC) 10 MG tablet Take 1 tablet (10 mg total) by mouth once daily., Starting Tue 11/8/2022, Normal      aspirin (ECOTRIN) 81 MG EC tablet Take 81 mg by mouth once daily. , Historical Med      atorvastatin (LIPITOR) 10 MG tablet Take 1 tablet (10 mg total) by mouth once daily., Starting Sun 11/27/2022, Normal      lanolin/mineral oil/petrolatum (ARTIFICIAL TEARS OPHT) Place 2 drops into both eyes daily as needed (Dry eye)., Historical Med      losartan (COZAAR) 100 MG tablet Take 1 tablet (100 mg total) by mouth once daily., Starting Tue 11/8/2022, Normal      omeprazole (PRILOSEC) 20 MG capsule Take 20 mg by mouth once daily., Historical Med      vitamin D (VITAMIN D3) 1000 units Tab Take 1,000 Units by mouth once daily., Historical Med               Discharge Diet:cardiac diet with Normal Fluid intake of 1500 - 2000 mL per day    Activity: activity as tolerated    Discharge Condition: Good    Disposition: Home or Self Care    Follow up:        Tests pending at the time of discharge: none        Time spent  on the discharge of the patient including review of hospital course with the patient. reviewing discharge medications and arranging follow-up care: >30 mins    Discharge examination: Patient was seen and examined on the date of discharge and determined to be suitable for discharge.        Milind Espinal M.D.  Department of [facility]  Medicine  [facility]

## 2023-08-26 NOTE — DISCHARGE INSTRUCTIONS
Dear Mr. Hernandes,    You were admitted for low heart rate. The electrophysiologists (cardiologists of abnormal heart rhythms) recommend a 48 hour monitor and I have placed a referral to follow up with cardiology to review the results.    Your dizziness is likely a condition called benign positional paroxysmal vertigo, I have given you a handout with a home exercise and made a referral for physical therapy for them to treat this condition.    I have also placed a referral to audiology for a hearing test.

## 2023-08-26 NOTE — PT/OT/SLP EVAL
Occupational Therapy   Co-Evaluation and Discharge Note  Co-treatment performed due to patient's multiple deficits requiring two skilled therapists to appropriately and safely assess patient's strength and endurance while facilitating functional tasks in addition to accommodating for patient's activity tolerance.      Name: Taran Hernandes  MRN: [id number]  Admitting Diagnosis: Bradycardia  Recent Surgery: * No surgery found *      Recommendations:     Discharge Recommendations: home  Discharge Equipment Recommendations: none  Barriers to discharge:  None    Assessment:     Taran Hernandes is a 82 y.o. male with a medical diagnosis of Bradycardia. Pt agreeable to therapy with wife present at bedside. Prior to hospital admission, pt was (I) with ADLs and functional mobility and highly active. He performed supine>sit with SPV, STS t/f with SBA, and functional ambulation in room and out in hallway with SBA using no AD without incident. BP taken while standing from EOB at 179/84 and HR low 60s. Pt initially endorsed feeling lightheaded while standing that improved as time progressed. At this time, patient is functioning at their prior level of function and does not require further acute OT services. He is appropriate for discharge home once medically appropriate.    Plan:     During this hospitalization, patient does not require further acute OT services.  Please re-consult if situation changes.    Plan of Care Reviewed with: patient, spouse    Subjective     Chief Complaint: lightheadedness   Patient/Family Comments/goals: to discharge home    Occupational Profile:  Living Environment: Pt lives with his spouse in a 1st floor apartment with no JOHS. T/s combo  Previous level of function: (I) with ADLs and functional mobility  Roles and Routines: retired from finance and (+); enjoys being active and going to the gym  Equipment Used at home: none  Assistance upon Discharge: wife    Pain/Comfort:  Pain Rating 1: 0/10  Pain  Rating Post-Intervention 1: 0/10    Patients cultural, spiritual, Yazdanism conflicts given the current situation: no    Objective:   Additional staff present:  Farooq PT    Communicated with: RN prior to session.  Patient found HOB elevated with Condom Catheter, telemetry upon OT entry to room.    General Precautions: Standard, fall  Orthopedic Precautions: N/A  Braces: N/A  Respiratory Status: Room air     Occupational Performance:    Bed Mobility:    Patient completed Scooting/Bridging with supervision  Patient completed Supine to Sit with supervision    Functional Mobility/Transfers:  Patient completed Sit <> Stand Transfer with stand by assistance  with  no assistive device   X2 trials from EOB  Functional Mobility: Pt engaged in functional mobility to simulate household/community distances with SBA using no AD in order to maximize functional endurance and standing balance required for engagement in occupations of choice   No LOB or SOB noted    Activities of Daily Living:  Upper Body Dressing: modified independence to don hospital gown EOB    Cognitive/Visual Perceptual:  Cognitive/Psychosocial Skills:     -       Oriented to: Person, Place, Time, and Situation   -       Follows Commands/attention:Follows multistep  commands  -       Safety awareness/insight to disability: intact   -       Mood/Affect/Coping skills/emotional control: Cooperative and Pleasant    Physical Exam:  Sensation:    -       Intact  light/touch B UE  Upper Extremity Range of Motion:     -       Right Upper Extremity: WFL  -       Left Upper Extremity: WFL  Upper Extremity Strength:    -       Right Upper Extremity: WFL  -       Left Upper Extremity: WFL    AMPAC 6 Click ADL:  AMPAC Total Score: 24    Treatment & Education:  Provided education regarding role of OT & discharge recommendations with pt and wife verbalizing understanding.  Pt had no further questions & when asked whether there were any concerns pt reported none.      Patient left up in chair with all lines intact, call button in reach, RN notified, and MD and wife present    GOALS:   Multidisciplinary Problems       Occupational Therapy Goals       Not on file              Multidisciplinary Problems (Resolved)          Problem: Occupational Therapy    Goal Priority Disciplines Outcome Interventions   Occupational Therapy Goal   (Resolved)     OT, PT/OT Met    Description: Pt is currently functioning at their baseline in ADLs and functional mobility. Therefore, further skilled OT intervention is not warranted at this time. Pt is appropriate to discharge home. Please re-consult if pt's functional status changes.                        History:     Past Medical History:   Diagnosis Date    BPH (benign prostatic hyperplasia)     BPH with urinary obstruction 8/22/2016    DDD (degenerative disc disease), lumbar     Dyslipidemia     Elevated prostate specific antigen (PSA) 8/22/2016    Fatty liver     GERD (gastroesophageal reflux disease)     Hematuria     History of adenomatous polyp of colon 7/17/2015    History of colonic polyps     Hypertension     Lumbar disc disease     Mild vitamin D deficiency     Renal disorder     S/P TURP 3/9/2020         Past Surgical History:   Procedure Laterality Date    CATARACT EXTRACTION W/  INTRAOCULAR LENS IMPLANT Left     Dr Ruggiero     CATARACT EXTRACTION W/  INTRAOCULAR LENS IMPLANT Right 03/27/16    Dr Ruggiero    CHOLECYSTECTOMY      COLONOSCOPY N/A 6/10/2021    Procedure: COLONOSCOPY;  Surgeon: Moody Morton MD;  Location: [facility] ([address] [place] [address]);  Service: Endoscopy;  Laterality: N/A;  prep ins. emailed -Pt. fully vaccinated.Feb.2021 EC    COLONOSCOPY N/A 8/25/2023    Procedure: COLONOSCOPY;  Surgeon: Moody Morton MD;  Location: [facility] ([address] [place] [address]);  Service: Endoscopy;  Laterality: N/A;  Referral: KAITLYN CASTRO  Very [place] states will most likely not answer phone since he can't hear, uses [facility]  PEG   Inst portal and handed to  pt    EYE SURGERY      FRACTURE SURGERY      left ankle    LASIK Bilateral 2016    NEUROMA SURGERY Right 2006    TRANSURETHRAL RESECTION OF PROSTATE N/A 3/3/2020    Procedure: TURP (TRANSURETHRAL RESECTION OF PROSTATE);  Surgeon: Vincent King MD;  Location: [facility] OR [address] [place] [address];  Service: Urology;  Laterality: N/A;  2hr       Time Tracking:     OT Date of Treatment: 08/26/23  OT Start Time: 0913  OT Stop Time: 0935  OT Total Time (min): 22 min    Billable Minutes:Evaluation 12  Therapeutic Activity 10    8/26/2023

## 2023-08-26 NOTE — CONSULTS
[facility] - Cardiology Stepdown  Cardiac Electrophysiology  Consult Note    Admission Date: 8/25/2023  Code Status: Full Code   Attending Provider: Milind Espinal MD  Consulting Provider: Divina Alva MD  Principal Problem:Bradycardia    Inpatient consult to Electrophysiology  Consult performed by: Divina Alva MD  Consult ordered by: Ethan Ziegler MD        Subjective:     Chief Complaint:  Dizziness     HPI:   Mr Hernandes is a 81 yo M with PMH HTN, HLD presenting to the ED after colonoscopy today for bradycardia. Pt accompanied by his wife. Yesterday, patient was in endoscopy receiving a colonoscopy in which he received propofol lidocaine.  Patient was bradycardic at heart rate of 30s given glycopyrrolate x2 with minimal improvement from heart rate in the 30s to the high 50s.  Postprocedure patient was bradycardic and brought down for continued monitoring and assessment and evaluation of bradycardia.  ECG showed junctional bradycardia. Patient reports over the past month he has had repeated episodes of dizziness when which he feels the room is spinning that is positional in nature, while in bed and with just getting up. This has been ongoing for 1 month. He also reports fatigued over the last couple months.  Patient denies any cardiac history.  Patient denies any actual episodes of syncope, chest pain, shortness of breath.  Colonoscopy done yesterday and revealed polyps and diverticulosis. On tele review, yesterday, he was intermittently in junctional bradycardia, however overnight and this AM, he is in NSR HR in high 40s - 60s with few PACs. While working with PT/OT, he denied any symtpoms. His HR went up to high 60s. Baseline HR in 50s. TTE pending.    EP consulted for symptomatic bradycardia.          Past Medical History:   Diagnosis Date    BPH (benign prostatic hyperplasia)     BPH with urinary obstruction 8/22/2016    DDD (degenerative disc disease), lumbar     Dyslipidemia     Elevated prostate specific  antigen (PSA) 8/22/2016    Fatty liver     GERD (gastroesophageal reflux disease)     Hematuria     History of adenomatous polyp of colon 7/17/2015    History of colonic polyps     Hypertension     Lumbar disc disease     Mild vitamin D deficiency     Renal disorder     S/P TURP 3/9/2020       Past Surgical History:   Procedure Laterality Date    CATARACT EXTRACTION W/  INTRAOCULAR LENS IMPLANT Left     Dr Ruggiero     CATARACT EXTRACTION W/  INTRAOCULAR LENS IMPLANT Right 03/27/16    Dr Ruggiero    CHOLECYSTECTOMY      COLONOSCOPY N/A 6/10/2021    Procedure: COLONOSCOPY;  Surgeon: Moody Morton MD;  Location: [facility] (4TH FLR);  Service: Endoscopy;  Laterality: N/A;  prep ins. emailed -Pt. fully vaccinated.Feb.2021 EC    COLONOSCOPY N/A 8/25/2023    Procedure: COLONOSCOPY;  Surgeon: Moody Morton MD;  Location: [facility] (4TH FLR);  Service: Endoscopy;  Laterality: N/A;  Referral: KAITLYN CASTRO  Very [place] states will most likely not answer phone since he can't hear, uses [facility]  PEG   Inst portal and handed to pt    EYE SURGERY      FRACTURE SURGERY      left ankle    LASIK Bilateral 2016    NEUROMA SURGERY Right 2006    TRANSURETHRAL RESECTION OF PROSTATE N/A 3/3/2020    Procedure: TURP (TRANSURETHRAL RESECTION OF PROSTATE);  Surgeon: Vincent King MD;  [facility];  Service: Urology;  Laterality: N/A;  2hr       Review of patient's allergies indicates:   Allergen Reactions    No known allergies        Current Facility-Administered Medications on File Prior to Encounter   Medication    [DISCONTINUED] 0.9%  NaCl infusion    [DISCONTINUED] LIDOcaine (cardiac) injection    [DISCONTINUED] propofol (DIPRIVAN) 10 mg/mL infusion    [DISCONTINUED] sodium chloride 0.9% infusion     Current Outpatient Medications on File Prior to Encounter   Medication Sig    acetaminophen (TYLENOL) 500 MG tablet Take 1,000 mg by mouth daily as needed for Pain.    amLODIPine (NORVASC) 10 MG tablet Take 1 tablet  (10 mg total) by mouth once daily.    aspirin (ECOTRIN) 81 MG EC tablet Take 81 mg by mouth once daily.     atorvastatin (LIPITOR) 10 MG tablet Take 1 tablet (10 mg total) by mouth once daily.    lanolin/mineral oil/petrolatum (ARTIFICIAL TEARS OPHT) Place 2 drops into both eyes daily as needed (Dry eye).    losartan (COZAAR) 100 MG tablet Take 1 tablet (100 mg total) by mouth once daily.    omeprazole (PRILOSEC) 20 MG capsule Take 20 mg by mouth once daily.    vitamin D (VITAMIN D3) 1000 units Tab Take 1,000 Units by mouth once daily.     Family History       Problem Relation (Age of Onset)    Cancer Mother, Brother    Diabetes Father    Heart attack Son    Heart disease Father, Son    Hypertension Father, Brother    Kidney disease Son    No Known Problems Daughter, Son, Son, Son          Tobacco Use    Smoking status: Never    Smokeless tobacco: Never   Substance and Sexual Activity    Alcohol use: Not Currently     Alcohol/week: 2.0 standard drinks of alcohol     Types: 2 Cans of beer per week     Comment: beer once a month    Drug use: No    Sexual activity: Yes     Partners: Female     Review of Systems   Constitutional: Positive for malaise/fatigue.   Cardiovascular: Negative.  Negative for chest pain, irregular heartbeat and leg swelling.   Musculoskeletal:  Negative for falls.   Neurological:  Positive for dizziness.   All other systems reviewed and are negative.    Objective:     Vital Signs (Most Recent):  Temp: 98.2 °F (36.8 °C) (08/26/23 0807)  Pulse: 69 (08/26/23 0900)  Resp: 18 (08/26/23 0900)  BP: (!) 178/84 (08/26/23 0900)  SpO2: (!) 94 % (08/26/23 0900) Vital Signs (24h Range):  Temp:  [97.2 °F (36.2 °C)-98.2 °F (36.8 °C)] 98.2 °F (36.8 °C)  Pulse:  [40-69] 69  Resp:  [10-20] 18  SpO2:  [92 %-98 %] 94 %  BP: ()/(56-95) 178/84       Weight: 77.1 kg (170 lb)  Body mass index is 29.18 kg/m².    SpO2: (!) 94 %        Physical Exam  Constitutional:       General: He is not in acute distress.      "Appearance: Normal appearance. He is not ill-appearing.   HENT:      Head: Normocephalic.      Mouth/Throat:      Mouth: Mucous membranes are moist.   Eyes:      Extraocular Movements: Extraocular movements intact.   Cardiovascular:      Rate and Rhythm: Regular rhythm. Bradycardia present.   Pulmonary:      Effort: Pulmonary effort is normal. No respiratory distress.   Abdominal:      Palpations: Abdomen is soft.   Musculoskeletal:      Right lower leg: No edema.      Left lower leg: No edema.   Skin:     General: Skin is warm.   Neurological:      Mental Status: He is alert and oriented to person, place, and time. Mental status is at baseline.   Psychiatric:         Mood and Affect: Mood normal.         Behavior: Behavior normal.         Thought Content: Thought content normal.            Significant Labs: BMP:   Recent Labs   Lab 08/25/23  1310 08/26/23  0514   GLU 85 141*    138   K 4.3 3.4*    107   CO2 21* 20*   BUN 10 12   CREATININE 1.4 1.1   CALCIUM 8.2* 8.6*   MG 1.9 1.8   , CMP:   Recent Labs   Lab 08/25/23  1310 08/26/23  0514    138   K 4.3 3.4*    107   CO2 21* 20*   GLU 85 141*   BUN 10 12   CREATININE 1.4 1.1   CALCIUM 8.2* 8.6*   PROT 6.9 7.2   ALBUMIN 3.6 3.6   BILITOT 0.7 0.6   ALKPHOS 68 73   AST 47* 36   ALT 41 36   ANIONGAP 11 11   , CBC:   Recent Labs   Lab 08/25/23  1558 08/26/23  0514   WBC 6.31 7.82   HGB 13.6* 13.5*   HCT 41.4 41.1    194   , and INR: No results for input(s): "INR", "PROTIME" in the last 48 hours.    Significant Imaging: reviewed              Assessment and Plan:     * Bradycardia  Mr Hernandes is a 81 yo M with PMH HTN, HLD presenting to the ED after colonoscopy for bradycardia for which he received glycopyrrolate following post-anesthesia.  ECG at that time showed junctional bradycardia. Patient reports occasional vertigo like symptoms that appear c/w with BPPV. Tele reviewed this AM shows NSR with HR now in the 60s and occasional PACs.    - " Bradycardia likely medication induced from anesthesia. Patient is not a candidate for pacemaker at this time.   - Will plan for outpatient 48 hr holter monitoring. Counseled patient to keep a diary of his symptoms.   - Patient can be discharged today from an EP perspective.          Thank you for your consult. I will sign off. Please contact us if you have any additional questions.    Divina Alva MD  Cardiac Electrophysiology  [facility] - Cardiology Stepdown

## 2023-08-26 NOTE — ASSESSMENT & PLAN NOTE
Mr Hernandes is a 83 yo M with PMH HTN, HLD presenting to the ED after colonoscopy for bradycardia for which he received glycopyrrolate following post-anesthesia.  ECG at that time showed junctional bradycardia. Patient reports occasional vertigo like symptoms. Tele reviewed this AM shows NSR with HR now in the 60s, similar to baseline of 50s.     - Bradycardia likely medication induced from anesthesia. Patient is not a candidate for pacemaker at this time.   - Will plan for outpatient 48 hr holter monitoring. Counseled patient to keep a diary of his symptoms.   - Patient can be discharged today from an EP perspective.

## 2023-08-26 NOTE — ASSESSMENT & PLAN NOTE
Noted to be in a narrow-complex, junctional bradycardia after colonoscopy; possibly anesthetic-induced.  Noted to have fatigue, dizziness x1 month; no additional s/s angina or heart failure.  HR improved to mid 60s with exercise in bed with transient restoration of NSR.  Still in intermittent junctional bradycardia with sinus bradycardia.     Hold all AVN blocking agents and hold home antihypertensives until orthostatics obtained and patient ambulates  TSH WNL  Check TTE  Monitor on telemetry, repeat EKG this morning  K>4, Mag>2  No indication for pacing at this time, but pending work-up may need PPM for symptomatic bradycardia

## 2023-08-26 NOTE — HPI
Mr Hernandes is a 81 yo M with PMH HTN, HLD presenting to the ED after colonoscopy today for bradycardia. Pt accompanied by his wife. Yesterday, patient was in endoscopy receiving a colonoscopy in which he received propofol lidocaine.  Patient was bradycardic at heart rate of 30s given glycopyrrolate x2 with minimal improvement from heart rate in the 30s to the high 50s.  Postprocedure patient was bradycardic and brought down for continued monitoring and assessment and evaluation of bradycardia.  ECG showed junctional bradycardia. Patient reports over the past month he has had repeated episodes of dizziness when which he feels the room is spinning that is positional in nature, while in bed and with just getting up. This has been ongoing for 1 month. He also reports fatigued over the last couple months.  Patient denies any cardiac history.  Patient denies any actual episodes of syncope, chest pain, shortness of breath.  Colonoscopy done yesterday and revealed polyps and diverticulosis. On tele review, yesterday, he was intermittently in junctional bradycardia, however overnight and this AM, he is in NSR HR in high 40s - 60s with few PACs. While working with PT/OT, he denied any symtpoms. His HR went up to high 60s. Baseline HR in 50s. TTE pending.    EP consulted for symptomatic bradycardia.

## 2023-08-26 NOTE — PT/OT/SLP EVAL
"Physical Therapy Co-Evaluation and Discharge Note    Patient Name:  Taran Hernandes   MRN:  [id number]  Admitting Diagnosis:  Bradycardia   Recent Surgery: * No surgery found *    Admit Date: 8/25/2023  Length of Stay: 0 days    Recommendations:     Discharge Recommendations:  home   Discharge Equipment Recommendations: none   Barriers to discharge: None    Assessment:     Taran Hernandes is a 82 y.o. male admitted with a medical diagnosis of Bradycardia. .  At this time, patient is functioning at their prior level of function and does not require further acute PT services.     Highest level of mobility achieved this visit: Ambulates 200 ft no A/D SBA    Treatment Tolerated: Excellent    Plan:     During this hospitalization, patient does not require further acute PT services.  Please re-consult if situation changes.      Subjective     RN Selene notified prior to session. Pt's wife present upon PT entrance into room.    Chief Complaint: difficulty hearing  Patient/Family Comments/goals: "My hearing aids don't help."  Pain/Comfort:  Pain Rating 1: 0/10    Social History:  Residence: lives with their spouse 1-story house with 0 JOSH.   Support available: Spouse  Equipment Used: none  Equipment Owned (not using): None  Prior level of function: independent  Work: Retired.   Drive: yes.     Objective:     Additional staff present: OT Elisabeth    Patient found sitting edge of bed with Condom Catheter, telemetry upon PT entry to room.    General Precautions: Standard, fall   Orthopedic Precautions:N/A   Braces: N/A   Body mass index is 29.18 kg/m².  Oxygen Device: Room Air  Vitals: BP (!) 154/76   Pulse (!) 54   Temp 98 °F (36.7 °C)   Resp 18   Ht 5' 4" (1.626 m)   Wt 77.1 kg (170 lb)   SpO2 (!) 92%   BMI 29.18 kg/m²     Exam:  Cognition:   Alert and Cooperative  Command following: Follows two-step verbal commands  Fluency: clear/fluent  Skin Integrity: Visible skin intact  Edema: None noted  Sensation: Intact to light " touch  Hearing: [place]  Vision:  Intact  Coordination: grossly intact  Range of Motion:  RLE: WFL  LLE: WFL  Strength Exam:  Bilateral Lower Extremity Strength: grossly WNL    Outcome Measures:  AM-PAC 6 CLICK MOBILITY  Turning over in bed (including adjusting bedclothes, sheets and blankets)?: 4  Sitting down on and standing up from a chair with arms (e.g., wheelchair, bedside commode, etc.): 4  Moving from lying on back to sitting on the side of the bed?: 4  Moving to and from a bed to a chair (including a wheelchair)?: 4  Need to walk in hospital room?: 3  Climbing 3-5 steps with a railing?: 3  Basic Mobility Total Score: 22     Functional Mobility:  Bed Mobility:   Supine to Sit: independence; from R side of bed  Scooting anteriorly to EOB to have both feet planted on floor: independence    Sitting Balance at Edge of Bed:  Assistance Level Required: [place]  Postural deviations noted: no deviations noted    Transfers:   Sit <> Stand Transfer: stand by assistance with no assistive device   Stand <> Sit Transfer: stand by assistance with no assistive device   [id number] from EOB    Standing Balance:  Assistance Level Required: Stand-by Assistance  Patient used: no assistive device   Time: 10 min  Postural deviations noted: no deviations noted  Gait:   Patient ambulated: 200ft   Patient required: standy by assistance  Patient used:  no assistive device   Gait Pattern observed: swing-through gait  Gait Deviation(s): decreased lorenza  Impairments due to: impaired balance  Gait belt utilized    Education:  Time provided for education, counseling and discussion of health disposition in regards to patient's current status  All questions answered within PT scope of practice and to patient's satisfaction    Patient left up in chair with call button in reach and wife present.      History:     Past Medical History:   Diagnosis Date    BPH (benign prostatic hyperplasia)     BPH with urinary obstruction 8/22/2016    DDD  (degenerative disc disease), lumbar     Dyslipidemia     Elevated prostate specific antigen (PSA) 8/22/2016    Fatty liver     GERD (gastroesophageal reflux disease)     Hematuria     History of adenomatous polyp of colon 7/17/2015    History of colonic polyps     Hypertension     Lumbar disc disease     Mild vitamin D deficiency     Renal disorder     S/P TURP 3/9/2020       Past Surgical History:   Procedure Laterality Date    CATARACT EXTRACTION W/  INTRAOCULAR LENS IMPLANT Left     Dr Ruggiero     CATARACT EXTRACTION W/  INTRAOCULAR LENS IMPLANT Right 03/27/16    Dr Ruggiero    CHOLECYSTECTOMY      COLONOSCOPY N/A 6/10/2021    Procedure: COLONOSCOPY;  Surgeon: Moody Morton MD;  Location: [facility] (4TH FLR);  Service: Endoscopy;  Laterality: N/A;  prep ins. emailed -Pt. fully vaccinated.Feb.2021 EC    COLONOSCOPY N/A 8/25/2023    Procedure: COLONOSCOPY;  Surgeon: Moody Morton MD;  Location: [facility] (4TH FLR);  Service: Endoscopy;  Laterality: N/A;  Referral: KAITLYN CASTRO  Very [place] states will most likely not answer phone since he can't hear, uses [facility]  PEG   Inst portal and handed to pt    EYE SURGERY      FRACTURE SURGERY      left ankle    LASIK Bilateral 2016    NEUROMA SURGERY Right 2006    TRANSURETHRAL RESECTION OF PROSTATE N/A 3/3/2020    Procedure: TURP (TRANSURETHRAL RESECTION OF PROSTATE);  Surgeon: Vincent King MD;  Location: [facility] OR [address] [place] [address];  Service: Urology;  Laterality: N/A;  2hr       Time Tracking:     PT Received On: 08/26/23  PT Start Time: 0912     PT Stop Time: 0934  PT Total Time (min): 22 min     Billable Minutes: Evaluation 1 procedure and Gait Training 11 min    Lul Garcia PT, DPT  8/26/2023

## 2023-08-26 NOTE — PLAN OF CARE
[facility] - Cardiology Stepdown  Discharge Final Note    Primary Care Provider: Rosita Rios MD    Expected Discharge Date: 8/26/2023    Message left with [facility] (ext. [phone]) requesting 48-hour holter monitor,    Final Discharge Note (most recent)       Final Note - 08/26/23 1354          Post-Acute Status    Post-Acute Authorization HME   48 hour holter monitor    HME Status Pending Delivery                     Important Message from Medicare

## 2023-08-28 ENCOUNTER — TELEPHONE (OUTPATIENT)
Dept: ELECTROPHYSIOLOGY | Facility: CLINIC | Age: 83
End: 2023-08-28
Payer: MEDICARE

## 2023-08-28 VITALS
RESPIRATION RATE: 18 BRPM | DIASTOLIC BLOOD PRESSURE: 76 MMHG | SYSTOLIC BLOOD PRESSURE: 154 MMHG | TEMPERATURE: 98 F | BODY MASS INDEX: 29.02 KG/M2 | OXYGEN SATURATION: 92 % | HEART RATE: 54 BPM | WEIGHT: 170 LBS | HEIGHT: 64 IN

## 2023-09-11 ENCOUNTER — CLINICAL SUPPORT (OUTPATIENT)
Dept: CARDIOLOGY | Facility: HOSPITAL | Age: 83
End: 2023-09-11
Attending: STUDENT IN AN ORGANIZED HEALTH CARE EDUCATION/TRAINING PROGRAM
Payer: MEDICARE

## 2023-09-11 DIAGNOSIS — R00.1 BRADYCARDIA: ICD-10-CM

## 2023-09-11 PROCEDURE — 93227 XTRNL ECG REC<48 HR R&I: CPT | Mod: HCNC,,, | Performed by: STUDENT IN AN ORGANIZED HEALTH CARE EDUCATION/TRAINING PROGRAM

## 2023-09-11 PROCEDURE — 93227 HOLTER MONITOR - 48 HOUR (CUPID ONLY): ICD-10-PCS | Mod: HCNC,,, | Performed by: STUDENT IN AN ORGANIZED HEALTH CARE EDUCATION/TRAINING PROGRAM

## 2023-09-11 PROCEDURE — 93225 XTRNL ECG REC<48 HRS REC: CPT | Mod: HCNC

## 2023-09-14 LAB
OHS CV EVENT MONITOR DAY: 0
OHS CV HOLTER LENGTH DECIMAL HOURS: 48
OHS CV HOLTER LENGTH HOURS: 48
OHS CV HOLTER LENGTH MINUTES: 0
OHS CV HOLTER SINUS AVERAGE HR: 53
OHS CV HOLTER SINUS MAX HR: 119
OHS CV HOLTER SINUS MIN HR: 36

## 2023-12-07 ENCOUNTER — PATIENT OUTREACH (OUTPATIENT)
Dept: ADMINISTRATIVE | Facility: HOSPITAL | Age: 83
End: 2023-12-07
Payer: MEDICARE

## 2023-12-08 DIAGNOSIS — I10 ESSENTIAL HYPERTENSION: ICD-10-CM

## 2023-12-08 RX ORDER — LOSARTAN POTASSIUM 100 MG/1
100 TABLET ORAL DAILY
Qty: 90 TABLET | Refills: 0 | Status: SHIPPED | OUTPATIENT
Start: 2023-12-08 | End: 2024-02-26 | Stop reason: SDUPTHER

## 2023-12-08 NOTE — TELEPHONE ENCOUNTER
Care Due:                  Date            Visit Type   Department     Provider  --------------------------------------------------------------------------------                                EP -                              PRIMARY      [facility] PRIMARY   Rosita Nunes  Last Visit: 11-      CARE ([facility])   CARE           Blanca  Next Visit: None Scheduled  None         None Found                                                            Last  Test          Frequency    Reason                     Performed    Due Date  --------------------------------------------------------------------------------    Office Visit  15 months..  amLODIPine, atorvastatin,   11- 02-                             losartan.................    Lipid Panel.  12 months..  atorvastatin.............  11- 11-    Health [facility] Embedded Care Due Messages. Reference number: [id number].   12/08/2023 2:10:35 PM CST

## 2024-01-01 DIAGNOSIS — I70.0 ATHEROSCLEROSIS OF ABDOMINAL AORTA: ICD-10-CM

## 2024-01-01 DIAGNOSIS — E78.5 HYPERLIPIDEMIA, UNSPECIFIED HYPERLIPIDEMIA TYPE: ICD-10-CM

## 2024-01-01 DIAGNOSIS — I10 ESSENTIAL HYPERTENSION: ICD-10-CM

## 2024-01-01 NOTE — TELEPHONE ENCOUNTER
No care due was identified.  Health [facility] Embedded Care Due Messages. Reference number: [id number].   1/01/2024 3:09:55 PM CST

## 2024-01-03 RX ORDER — AMLODIPINE BESYLATE 10 MG/1
10 TABLET ORAL
Qty: 90 TABLET | Refills: 0 | Status: SHIPPED | OUTPATIENT
Start: 2024-01-03 | End: 2024-02-26 | Stop reason: SDUPTHER

## 2024-01-03 RX ORDER — ATORVASTATIN CALCIUM 10 MG/1
10 TABLET, FILM COATED ORAL DAILY
Qty: 90 TABLET | Refills: 0 | Status: SHIPPED | OUTPATIENT
Start: 2024-01-03 | End: 2024-02-26 | Stop reason: SDUPTHER

## 2024-02-26 ENCOUNTER — OFFICE VISIT (OUTPATIENT)
Dept: PRIMARY CARE CLINIC | Facility: CLINIC | Age: 84
End: 2024-02-26
Payer: MEDICARE

## 2024-02-26 VITALS
HEIGHT: 64 IN | SYSTOLIC BLOOD PRESSURE: 142 MMHG | OXYGEN SATURATION: 98 % | DIASTOLIC BLOOD PRESSURE: 72 MMHG | BODY MASS INDEX: 30.19 KG/M2 | RESPIRATION RATE: 20 BRPM | TEMPERATURE: 99 F | WEIGHT: 176.81 LBS | HEART RATE: 64 BPM

## 2024-02-26 DIAGNOSIS — I70.0 ATHEROSCLEROSIS OF ABDOMINAL AORTA: ICD-10-CM

## 2024-02-26 DIAGNOSIS — R73.03 PRE-DIABETES: ICD-10-CM

## 2024-02-26 DIAGNOSIS — M48.061 DEGENERATIVE LUMBAR SPINAL STENOSIS: ICD-10-CM

## 2024-02-26 DIAGNOSIS — Z29.11 NEED FOR RSV VACCINATION: ICD-10-CM

## 2024-02-26 DIAGNOSIS — Z23 NEED FOR COVID-19 VACCINE: ICD-10-CM

## 2024-02-26 DIAGNOSIS — E78.5 HYPERLIPIDEMIA, UNSPECIFIED HYPERLIPIDEMIA TYPE: ICD-10-CM

## 2024-02-26 DIAGNOSIS — K76.0 FATTY LIVER: ICD-10-CM

## 2024-02-26 DIAGNOSIS — I50.30 HYPERTENSIVE HEART DISEASE WITH DIASTOLIC HEART FAILURE: Primary | ICD-10-CM

## 2024-02-26 DIAGNOSIS — N18.31 STAGE 3A CHRONIC KIDNEY DISEASE: ICD-10-CM

## 2024-02-26 DIAGNOSIS — M54.16 LUMBAR RADICULOPATHY, CHRONIC: ICD-10-CM

## 2024-02-26 DIAGNOSIS — I11.0 HYPERTENSIVE HEART DISEASE WITH DIASTOLIC HEART FAILURE: Primary | ICD-10-CM

## 2024-02-26 DIAGNOSIS — R10.11 RUQ ABDOMINAL PAIN: ICD-10-CM

## 2024-02-26 PROBLEM — K51.40 PSEUDOPOLYP OF DESCENDING COLON WITHOUT COMPLICATION: Status: RESOLVED | Noted: 2022-11-08 | Resolved: 2024-02-26

## 2024-02-26 LAB
BACTERIA #/AREA URNS AUTO: ABNORMAL /HPF
BILIRUB UR QL STRIP: ABNORMAL
CAOX CRY UR QL COMP ASSIST: ABNORMAL
CLARITY UR REFRACT.AUTO: ABNORMAL
COLOR UR AUTO: ABNORMAL
GLUCOSE UR QL STRIP: NEGATIVE
HGB UR QL STRIP: NEGATIVE
HYALINE CASTS UR QL AUTO: 0 /LPF
KETONES UR QL STRIP: ABNORMAL
LEUKOCYTE ESTERASE UR QL STRIP: NEGATIVE
MICROSCOPIC COMMENT: ABNORMAL
NITRITE UR QL STRIP: NEGATIVE
PH UR STRIP: 5 [PH] (ref 5–8)
PROT UR QL STRIP: ABNORMAL
RBC #/AREA URNS AUTO: 2 /HPF (ref 0–4)
SP GR UR STRIP: 1.03 (ref 1–1.03)
SQUAMOUS #/AREA URNS AUTO: 0 /HPF
URN SPEC COLLECT METH UR: ABNORMAL
WBC #/AREA URNS AUTO: 4 /HPF (ref 0–5)

## 2024-02-26 PROCEDURE — 99215 OFFICE O/P EST HI 40 MIN: CPT | Mod: S$GLB,,, | Performed by: INTERNAL MEDICINE

## 2024-02-26 PROCEDURE — 3078F DIAST BP <80 MM HG: CPT | Mod: CPTII,S$GLB,, | Performed by: INTERNAL MEDICINE

## 2024-02-26 PROCEDURE — 1160F RVW MEDS BY RX/DR IN RCRD: CPT | Mod: CPTII,S$GLB,, | Performed by: INTERNAL MEDICINE

## 2024-02-26 PROCEDURE — 1101F PT FALLS ASSESS-DOCD LE1/YR: CPT | Mod: CPTII,S$GLB,, | Performed by: INTERNAL MEDICINE

## 2024-02-26 PROCEDURE — 3288F FALL RISK ASSESSMENT DOCD: CPT | Mod: CPTII,S$GLB,, | Performed by: INTERNAL MEDICINE

## 2024-02-26 PROCEDURE — 1125F AMNT PAIN NOTED PAIN PRSNT: CPT | Mod: CPTII,S$GLB,, | Performed by: INTERNAL MEDICINE

## 2024-02-26 PROCEDURE — 99999 PR PBB SHADOW E&M-EST. PATIENT-LVL V: CPT | Mod: PBBFAC,HCNC,, | Performed by: INTERNAL MEDICINE

## 2024-02-26 PROCEDURE — 81001 URINALYSIS AUTO W/SCOPE: CPT | Performed by: INTERNAL MEDICINE

## 2024-02-26 PROCEDURE — 1159F MED LIST DOCD IN RCRD: CPT | Mod: CPTII,S$GLB,, | Performed by: INTERNAL MEDICINE

## 2024-02-26 PROCEDURE — 3077F SYST BP >= 140 MM HG: CPT | Mod: CPTII,S$GLB,, | Performed by: INTERNAL MEDICINE

## 2024-02-26 RX ORDER — ATORVASTATIN CALCIUM 10 MG/1
10 TABLET, FILM COATED ORAL DAILY
Qty: 90 TABLET | Refills: 2 | Status: SHIPPED | OUTPATIENT
Start: 2024-02-26

## 2024-02-26 RX ORDER — AMLODIPINE BESYLATE 10 MG/1
10 TABLET ORAL DAILY
Qty: 90 TABLET | Refills: 2 | Status: SHIPPED | OUTPATIENT
Start: 2024-02-26

## 2024-02-26 RX ORDER — GABAPENTIN 300 MG/1
300 CAPSULE ORAL 2 TIMES DAILY
Qty: 60 CAPSULE | Refills: 1 | Status: SHIPPED | OUTPATIENT
Start: 2024-02-26

## 2024-02-26 RX ORDER — LOSARTAN POTASSIUM 100 MG/1
100 TABLET ORAL DAILY
Qty: 90 TABLET | Refills: 2 | Status: SHIPPED | OUTPATIENT
Start: 2024-02-26

## 2024-02-27 NOTE — PROGRESS NOTES
Subjective     Patient ID: Taran Hernandes is a 83 y.o. male.    Chief Complaint: Annual Exam    Last seen 15 months ago. Returns for annual physical and f/u chronic medical conditions. Taking daily meds as prescribed. Multiple complaints detailed below.     PMH:  Hypertension. EF 60-65% with Grade I diastolic dysfunction on Echo .   Pre-Diabetes.  Mild Dyslipidemia.  CKD stage 3, GFR 50's.  Abdominal Aorta Atherosclerosis.   Fatty Liver, 4 cm complex liver cyst on MRI .  GERD.  Colon Polyp, adenomas.   Hematuria, recurrent UTI, BPH.  Mild Lumbar DJD/Disc disease, MRI .   Diffuse Diverticulosis.  Acute Prostatitis with sepsis 2013.  Acute Pancreatitis Aug. '16.    Hearing impairment, eval by ENT in the past.     Colonoscopy  internal hemorrhoid, diverticulosis, mult adenomas - rep 2 yrs. Eye exam 3/23. Vaccines reviewed.    PSH: Cholecystectomy. Left Ankle Fracture. Bilateral Cataract Extraction. TURP 3/20.    Social: Never smoked, No alcohol. , 6 children. , retired.     FMH: Father  age 76 with HTN, DM, Heart disease. Mother  age 82 complications of C-scope, suspected to have Colon cancer. Half brother had osteosarcoma. Half brother had liver cancer, heavy EtOH. Half brother  of a stroke noncompliant with HTN management.     NKDA. No food allergies.     MEDICATIONS: list reviewed and reconciled.           Review of Systems   Constitutional:  Negative for activity change, appetite change, chills, fatigue, fever and unexpected weight change.   HENT:  Positive for hearing loss. Negative for nasal congestion, ear pain, postnasal drip, rhinorrhea, sore throat, trouble swallowing and voice change.    Eyes:  Negative for pain and visual disturbance.   Respiratory:  Negative for apnea, cough, chest tightness, shortness of breath and wheezing.    Cardiovascular:  Negative for chest pain, palpitations and leg swelling.   Gastrointestinal:  Negative for blood in stool,  "constipation, diarrhea, nausea and vomiting.        RUQ abdominal pain daily x 2 months, no N/V, not relieved with Omeprazole.    Genitourinary:  Negative for difficulty urinating, dysuria, frequency, hematuria and urgency.   Musculoskeletal:  Positive for back pain and leg pain. Negative for arthralgias, gait problem, myalgias, neck pain and neck stiffness.        Chronic low back pain radiating down right leg with numbness is worsening, no weakness. Chronic right foot pain on plantar surface has recurred after neuroma removed years ago.Takes two tabs of Tylenol 500 with little relief.    Integumentary:  Negative for color change and rash.   Neurological:  Negative for dizziness, seizures, syncope, weakness, numbness and headaches.   Hematological:  Negative for adenopathy. Does not bruise/bleed easily.   Psychiatric/Behavioral:  Negative for agitation, confusion, dysphoric mood and sleep disturbance. The patient is not nervous/anxious.           Objective   Vitals:    02/26/24 1332 02/26/24 1432   BP: (!) 144/74 (!) 142/72   BP Location: Right arm Right arm   Patient Position: Sitting Sitting   Pulse: 64    Resp: 20    Temp: 98.7 °F (37.1 °C)    TempSrc: Oral    SpO2: 98%    Weight: 80.2 kg (176 lb 12.9 oz) From 175.7 last visit here.    Height: 5' 4" (1.626 m)    BMI=30.4  Physical Exam  Constitutional:       General: He is not in acute distress.     Appearance: Normal appearance. He is well-developed. He is not ill-appearing or diaphoretic.   HENT:      Head: Normocephalic and atraumatic.      Right Ear: Tympanic membrane, ear canal and external ear normal.      Left Ear: Tympanic membrane, ear canal and external ear normal.      Nose: Nose normal. No congestion.      Mouth/Throat:      Mouth: Mucous membranes are moist.      Pharynx: Oropharynx is clear.   Eyes:      General: No scleral icterus.     Extraocular Movements: Extraocular movements intact.      Conjunctiva/sclera: Conjunctivae normal.   Neck:      " Thyroid: No thyromegaly.      Vascular: No carotid bruit or JVD.   Cardiovascular:      Rate and Rhythm: Normal rate and regular rhythm.      Pulses: Normal pulses.      Heart sounds: Normal heart sounds. No murmur heard.     No friction rub. No gallop.   Pulmonary:      Effort: Pulmonary effort is normal. No respiratory distress.      Breath sounds: Normal breath sounds. No wheezing, rhonchi or rales.   Abdominal:      General: Bowel sounds are normal. There is no distension.      Palpations: Abdomen is soft. There is no mass.      Tenderness: There is no abdominal tenderness.      Hernia: No hernia is present.   Musculoskeletal:         General: No tenderness or deformity. Normal range of motion.      Cervical back: Normal range of motion and neck supple.      Right lower leg: No edema.      Left lower leg: No edema.   Lymphadenopathy:      Cervical: No cervical adenopathy.   Skin:     General: Skin is warm and dry.      Findings: No rash.   Neurological:      General: No focal deficit present.      Mental Status: He is alert and oriented to person, place, and time.      Cranial Nerves: No cranial nerve deficit.      Motor: No weakness or abnormal muscle tone.      Coordination: Coordination normal.      Gait: Gait normal.   Psychiatric:         Mood and Affect: Mood and affect normal.         Speech: Speech normal.         Behavior: Behavior normal.         Thought Content: Thought content normal.         Judgment: Judgment normal.          Assessment and Plan     1. Hypertensive heart disease with diastolic heart failure - fair control, continue same.   -     CBC Auto Differential; Future; Expected date: 02/26/2024  -     Comprehensive Metabolic Panel; Future; Expected date: 02/26/2024  -     amLODIPine (NORVASC) 10 MG tablet; Take 1 tablet (10 mg total) by mouth once daily.  Dispense: 90 tablet; Refill: 2  -     losartan (COZAAR) 100 MG tablet; Take 1 tablet (100 mg total) by mouth once daily.  Dispense: 90  tablet; Refill: 2    2. Hyperlipidemia, unspecified hyperlipidemia type - typically controlled  -     Lipid Panel; Future; Expected date: 02/26/2024  -     atorvastatin (LIPITOR) 10 MG tablet; Take 1 tablet (10 mg total) by mouth once daily.  Dispense: 90 tablet; Refill: 2    3. Pre-diabetes  -     Hemoglobin A1C; Future; Expected date: 02/26/2024  -     counseled on diet.     4. Atherosclerosis of abdominal aorta  -     atorvastatin (LIPITOR) 10 MG tablet; Take 1 tablet (10 mg total) by mouth once daily.  Dispense: 90 tablet; Refill: 2    5. Stage 3a chronic kidney disease - labs as above        -    may continue Acetaminophen, avoid NSAIDS.     6. Fatty liver - imaging as below.     7. RUQ abdominal pain  -     Lipase; Future; Expected date: 02/26/2024  -     Urinalysis  -     US Abdomen Limited; Future; Expected date: 02/26/2024    8. Degenerative lumbar spinal stenosis  -     MRI Lumbar Spine Without Contrast; Future; Expected date: 02/26/2024  -     gabapentin (NEURONTIN) 300 MG capsule; Take 1 capsule (300 mg total) by mouth 2 (two) times daily.  Dispense: 60 capsule; Refill: 1  -     Ambulatory referral/consult to Pain Clinic; Future; Expected date: 03/04/2024    9. Lumbar radiculopathy, chronic  -     MRI Lumbar Spine Without Contrast; Future; Expected date: 02/26/2024    10. Need for COVID-19 vaccine - COVID booster today.     11. Need for RSV vaccination - RSV vaccine today.        Follow up in about 6 months (around 8/26/2024) for Blood Pressure Check.

## 2024-03-01 ENCOUNTER — HOSPITAL ENCOUNTER (OUTPATIENT)
Dept: RADIOLOGY | Facility: HOSPITAL | Age: 84
Discharge: HOME | End: 2024-03-01
Attending: INTERNAL MEDICINE
Payer: MEDICARE

## 2024-03-01 DIAGNOSIS — R10.11 RUQ ABDOMINAL PAIN: ICD-10-CM

## 2024-03-01 PROCEDURE — 76705 ECHO EXAM OF ABDOMEN: CPT | Mod: TC,HCNC

## 2024-03-01 PROCEDURE — 76705 ECHO EXAM OF ABDOMEN: CPT | Mod: 26,HCNC,, | Performed by: STUDENT IN AN ORGANIZED HEALTH CARE EDUCATION/TRAINING PROGRAM

## 2024-03-06 ENCOUNTER — PATIENT MESSAGE (OUTPATIENT)
Dept: PRIMARY CARE CLINIC | Facility: CLINIC | Age: 84
End: 2024-03-06
Payer: MEDICARE

## 2024-03-06 DIAGNOSIS — R10.13 EPIGASTRIC ABDOMINAL PAIN: Primary | ICD-10-CM

## 2024-03-07 ENCOUNTER — TELEPHONE (OUTPATIENT)
Dept: ENDOSCOPY | Facility: HOSPITAL | Age: 84
End: 2024-03-07
Payer: MEDICARE

## 2024-03-12 ENCOUNTER — TELEPHONE (OUTPATIENT)
Dept: ENDOSCOPY | Facility: HOSPITAL | Age: 84
End: 2024-03-12
Payer: MEDICARE

## 2024-03-13 ENCOUNTER — TELEPHONE (OUTPATIENT)
Dept: ENDOSCOPY | Facility: HOSPITAL | Age: 84
End: 2024-03-13
Payer: MEDICARE

## 2024-03-21 ENCOUNTER — TELEPHONE (OUTPATIENT)
Dept: SPINE | Facility: CLINIC | Age: 84
End: 2024-03-21
Payer: MEDICARE

## 2024-03-21 ENCOUNTER — PATIENT MESSAGE (OUTPATIENT)
Dept: SPINE | Facility: CLINIC | Age: 84
End: 2024-03-21
Payer: MEDICARE

## 2024-03-21 NOTE — TELEPHONE ENCOUNTER
Staff left pt a voice message and also sent an portal message regards of getting rescheduled with  on 3/27 pt also left pt an call back number .

## 2024-03-22 ENCOUNTER — TELEPHONE (OUTPATIENT)
Dept: ENDOSCOPY | Facility: HOSPITAL | Age: 84
End: 2024-03-22
Payer: MEDICARE

## 2024-03-22 NOTE — TELEPHONE ENCOUNTER
Endoscopy Scheduling Department  [facility] [address] [place] [address]  Take the [facility] Elevators to 4th Floor Endoscopy Lab      Date: 3/22/24      Medical Record # [id number]      Dear  Taran Hernandes    An order for the following procedure(s) Upper Endoscopy (EGD) was placed for you by   Rosita Rios MD.    Since multiple attempts have been made to get in touch with you, this is the last notification. Please call the scheduling nurse to schedule this procedure as soon as possible.    If you have already scheduled this appointment, please disregard this letter. If you would like to cancel this request, please call the number listed below.     Sincerely,        Endoscopy Scheduling Department [phone]        Comments: Office hours are Monday through Friday 8-430p.

## 2024-03-23 ENCOUNTER — HOSPITAL ENCOUNTER (OUTPATIENT)
Dept: RADIOLOGY | Facility: HOSPITAL | Age: 84
Discharge: HOME | End: 2024-03-23
Attending: INTERNAL MEDICINE
Payer: MEDICARE

## 2024-03-23 DIAGNOSIS — M54.16 LUMBAR RADICULOPATHY, CHRONIC: ICD-10-CM

## 2024-03-23 DIAGNOSIS — M48.061 DEGENERATIVE LUMBAR SPINAL STENOSIS: ICD-10-CM

## 2024-03-23 PROCEDURE — 72148 MRI LUMBAR SPINE W/O DYE: CPT | Mod: TC,HCNC

## 2024-03-23 PROCEDURE — 72148 MRI LUMBAR SPINE W/O DYE: CPT | Mod: 26,HCNC,, | Performed by: RADIOLOGY

## 2024-03-25 ENCOUNTER — PATIENT MESSAGE (OUTPATIENT)
Dept: PRIMARY CARE CLINIC | Facility: CLINIC | Age: 84
End: 2024-03-25
Payer: MEDICARE

## 2024-10-16 ENCOUNTER — PATIENT MESSAGE (OUTPATIENT)
Dept: ADMINISTRATIVE | Facility: HOSPITAL | Age: 84
End: 2024-10-16
Payer: MEDICARE

## 2024-10-18 ENCOUNTER — PATIENT MESSAGE (OUTPATIENT)
Dept: ADMINISTRATIVE | Facility: HOSPITAL | Age: 84
End: 2024-10-18
Payer: MEDICARE

## 2024-10-30 ENCOUNTER — OFFICE VISIT (OUTPATIENT)
Dept: INTERNAL MEDICINE | Facility: CLINIC | Age: 84
End: 2024-10-30
Payer: MEDICARE

## 2024-10-30 VITALS
HEART RATE: 93 BPM | WEIGHT: 177.5 LBS | BODY MASS INDEX: 30.3 KG/M2 | OXYGEN SATURATION: 96 % | SYSTOLIC BLOOD PRESSURE: 138 MMHG | HEIGHT: 64 IN | DIASTOLIC BLOOD PRESSURE: 62 MMHG

## 2024-10-30 DIAGNOSIS — I10 PRIMARY HYPERTENSION: ICD-10-CM

## 2024-10-30 DIAGNOSIS — R73.03 PRE-DIABETES: ICD-10-CM

## 2024-10-30 DIAGNOSIS — N18.31 STAGE 3A CHRONIC KIDNEY DISEASE: ICD-10-CM

## 2024-10-30 DIAGNOSIS — H91.90 HEARING LOSS, UNSPECIFIED HEARING LOSS TYPE, UNSPECIFIED LATERALITY: Primary | ICD-10-CM

## 2024-10-30 LAB
BILIRUB SERPL-MCNC: NORMAL MG/DL
BLOOD URINE, POC: NORMAL
CLARITY, POC UA: CLEAR
COLOR, POC UA: YELLOW
GLUCOSE UR QL STRIP: NEGATIVE
KETONES UR QL STRIP: NORMAL
LEUKOCYTE ESTERASE URINE, POC: NORMAL
NITRITE, POC UA: NEGATIVE
PH, POC UA: 7
PROTEIN, POC: >=300
SPECIFIC GRAVITY, POC UA: 1.02
UROBILINOGEN, POC UA: 2

## 2024-10-30 PROCEDURE — 81002 URINALYSIS NONAUTO W/O SCOPE: CPT | Mod: HCNC,GC,S$GLB,

## 2024-10-30 PROCEDURE — 1101F PT FALLS ASSESS-DOCD LE1/YR: CPT | Mod: HCNC,CPTII,GC,S$GLB

## 2024-10-30 PROCEDURE — 3288F FALL RISK ASSESSMENT DOCD: CPT | Mod: HCNC,CPTII,GC,S$GLB

## 2024-10-30 PROCEDURE — 1125F AMNT PAIN NOTED PAIN PRSNT: CPT | Mod: HCNC,CPTII,GC,S$GLB

## 2024-10-30 PROCEDURE — 99999 PR PBB SHADOW E&M-EST. PATIENT-LVL IV: CPT | Mod: PBBFAC,HCNC,GC,

## 2024-10-30 PROCEDURE — 99214 OFFICE O/P EST MOD 30 MIN: CPT | Mod: HCNC,GC,S$GLB,

## 2024-10-30 PROCEDURE — 3075F SYST BP GE 130 - 139MM HG: CPT | Mod: HCNC,CPTII,GC,S$GLB

## 2024-10-30 PROCEDURE — 1159F MED LIST DOCD IN RCRD: CPT | Mod: HCNC,CPTII,GC,S$GLB

## 2024-10-30 PROCEDURE — 3078F DIAST BP <80 MM HG: CPT | Mod: HCNC,CPTII,GC,S$GLB

## 2024-11-02 ENCOUNTER — LAB VISIT (OUTPATIENT)
Dept: LAB | Facility: HOSPITAL | Age: 84
End: 2024-11-02
Payer: MEDICARE

## 2024-11-02 DIAGNOSIS — R73.03 PRE-DIABETES: ICD-10-CM

## 2024-11-02 LAB
ALBUMIN SERPL BCP-MCNC: 3.3 G/DL (ref 3.5–5.2)
ALP SERPL-CCNC: 73 U/L (ref 40–150)
ALT SERPL W/O P-5'-P-CCNC: 37 U/L (ref 10–44)
ANION GAP SERPL CALC-SCNC: 10 MMOL/L (ref 8–16)
AST SERPL-CCNC: 46 U/L (ref 10–40)
BILIRUB SERPL-MCNC: 0.7 MG/DL (ref 0.1–1)
BUN SERPL-MCNC: 13 MG/DL (ref 8–23)
CALCIUM SERPL-MCNC: 8.9 MG/DL (ref 8.7–10.5)
CHLORIDE SERPL-SCNC: 106 MMOL/L (ref 95–110)
CO2 SERPL-SCNC: 20 MMOL/L (ref 23–29)
CREAT SERPL-MCNC: 1.2 MG/DL (ref 0.5–1.4)
EST. GFR  (NO RACE VARIABLE): 59.6 ML/MIN/1.73 M^2
ESTIMATED AVG GLUCOSE: 120 MG/DL (ref 68–131)
GLUCOSE SERPL-MCNC: 98 MG/DL (ref 70–110)
HBA1C MFR BLD: 5.8 % (ref 4–5.6)
POTASSIUM SERPL-SCNC: 3.9 MMOL/L (ref 3.5–5.1)
PROT SERPL-MCNC: 7.5 G/DL (ref 6–8.4)
SODIUM SERPL-SCNC: 136 MMOL/L (ref 136–145)

## 2024-11-02 PROCEDURE — 83036 HEMOGLOBIN GLYCOSYLATED A1C: CPT | Mod: HCNC

## 2024-11-02 PROCEDURE — 36415 COLL VENOUS BLD VENIPUNCTURE: CPT | Mod: HCNC

## 2024-11-02 PROCEDURE — 80053 COMPREHEN METABOLIC PANEL: CPT | Mod: HCNC

## 2024-11-06 ENCOUNTER — TELEPHONE (OUTPATIENT)
Dept: INFECTIOUS DISEASES | Facility: HOSPITAL | Age: 84
End: 2024-11-06
Payer: MEDICARE

## 2024-11-23 DIAGNOSIS — I50.30 HYPERTENSIVE HEART DISEASE WITH DIASTOLIC HEART FAILURE: ICD-10-CM

## 2024-11-23 DIAGNOSIS — I11.0 HYPERTENSIVE HEART DISEASE WITH DIASTOLIC HEART FAILURE: ICD-10-CM

## 2024-11-24 RX ORDER — AMLODIPINE BESYLATE 10 MG/1
10 TABLET ORAL
Qty: 90 TABLET | Refills: 3 | Status: SHIPPED | OUTPATIENT
Start: 2024-11-24

## 2024-11-24 RX ORDER — LOSARTAN POTASSIUM 100 MG/1
100 TABLET ORAL
Qty: 90 TABLET | Refills: 3 | Status: SHIPPED | OUTPATIENT
Start: 2024-11-24

## 2024-11-24 NOTE — TELEPHONE ENCOUNTER
Refill Routing Note   Medication(s) are not appropriate for processing by [facility] for the following reason(s):        Non-participating provider    [facility] action(s):  Route             Appointments  past 12m or future 3m with PCP    Date Provider   Last Visit   10/30/2024 Bruna Salinas DO   Next Visit   Visit date not found Bruna Salinas DO   ED visits in past 90 days: 0        Note composed:9:24 PM 11/23/2024

## 2025-01-13 DIAGNOSIS — Z00.00 ENCOUNTER FOR MEDICARE ANNUAL WELLNESS EXAM: ICD-10-CM

## 2025-01-13 NOTE — PROGRESS NOTES
Subjective:       Patient ID: Taran Hernandes is a 79 y.o. male.    Chief Complaint: Annual Exam    Last seen 18 months ago. Returns for annual exam and f/u chronic medical conditions. No acute complaints. Blood pressure ranges 130's/70's at home per history, no log. Started a new exercise regimen three weeks ago.     PMH:  Hypertension. Stress Echo negative , EF 60%.  Pre-Diabetes. HbA1c 5.9% .  Mild Dyslipidemia. LDL 67 .  CKD stage 3, GFR 50's.   Abdominal Aorta Atherosclerosis on imaging.   Fatty Liver.  GERD.  Colon Polyp, adenomas.   Hematuria, recurrent UTI, BPH.  Mild Lumbar DJD/Disc disease, MRI .   Diffuse Diverticulosis.  Acute Prostatitis with sepsis 2013.  Acute Pancreatitis Aug. '16.    Hearing impairment, eval by ENT in the past.     Colonoscopy 7/15 one tubular adenoma, subepithelial lipoma in rectum - rep 5 yrs. Eye exam 3/19. Flu shot . Tdap 3/19. Prevnar 6/15. Pneumovax . Shingrix .    PSH: Cholecystectomy. Left Ankle Fracture. Bilateral Cataract Extraction. TURP 3/20.    Social: Never smoked, No alcohol. , 6 children all living, 5 local. , retired.     FMH: Father  age 76 with HTN, DM, Heart disease. Mother  age 82 complications of C-scope, suspected to have Colon cancer. Half brother had osteosarcoma. Half brother had liver cancer, heavy EtOH. Half brother  of a stroke noncompliant with HTN management.     NKDA. No food allergies.     MEDICATIONS: list reviewed and reconciled. Omeprazole daily for acid reflux.        Review of Systems   Constitutional: Negative for activity change, appetite change, chills, fatigue, fever and unexpected weight change.   HENT: Negative for nasal congestion, ear pain, hearing loss, postnasal drip, rhinorrhea, sore throat, trouble swallowing and voice change.    Eyes: Negative for pain and visual disturbance.   Respiratory: Negative for apnea, cough, chest tightness, shortness of breath and  "wheezing.    Cardiovascular: Negative for chest pain, palpitations and leg swelling.   Gastrointestinal: Positive for reflux. Negative for abdominal pain, blood in stool, constipation, diarrhea, nausea and vomiting.   Genitourinary: Negative for difficulty urinating, dysuria, frequency, hematuria and urgency.   Musculoskeletal: Negative for arthralgias, back pain, gait problem, myalgias, neck pain and neck stiffness.   Integumentary:  Negative for color change and rash.   Neurological: Negative for dizziness, seizures, syncope, weakness, numbness and headaches.   Hematological: Negative for adenopathy. Does not bruise/bleed easily.   Psychiatric/Behavioral: Negative for agitation, dysphoric mood and sleep disturbance. The patient is not nervous/anxious.          Objective:    /60, Pulse 70, Temp 98.3, O2 Sat 97%, Ht 5' 4", Wt 178.9 lbs, BMI=30.7  Physical Exam  Constitutional:       General: He is not in acute distress.     Appearance: Normal appearance. He is well-developed. He is not diaphoretic.   HENT:      Head: Normocephalic and atraumatic.      Right Ear: Tympanic membrane, ear canal and external ear normal.      Left Ear: Tympanic membrane, ear canal and external ear normal.      Nose: Nose normal.      Mouth/Throat:      Mouth: Mucous membranes are moist.   Eyes:      General: No scleral icterus.     Extraocular Movements: Extraocular movements intact.      Conjunctiva/sclera: Conjunctivae normal.      Pupils: Pupils are equal, round, and reactive to light.   Neck:      Musculoskeletal: Normal range of motion and neck supple.      Thyroid: No thyromegaly.      Vascular: No carotid bruit or JVD.   Cardiovascular:      Rate and Rhythm: Normal rate and regular rhythm.      Pulses: Normal pulses.      Heart sounds: Normal heart sounds. No murmur. No friction rub. No gallop.    Pulmonary:      Effort: Pulmonary effort is normal. No respiratory distress.      Breath sounds: Normal breath sounds. No " wheezing, rhonchi or rales.   Abdominal:      General: Bowel sounds are normal. There is no distension.      Palpations: Abdomen is soft. There is no mass.      Tenderness: There is no abdominal tenderness.      Hernia: No hernia is present.   Musculoskeletal: Normal range of motion.         General: No tenderness or deformity.      Right lower leg: No edema.      Left lower leg: No edema.   Lymphadenopathy:      Cervical: No cervical adenopathy.   Skin:     General: Skin is warm and dry.      Findings: No rash.   Neurological:      General: No focal deficit present.      Mental Status: He is alert and oriented to person, place, and time.      Cranial Nerves: No cranial nerve deficit.      Motor: No abnormal muscle tone.      Coordination: Coordination normal.      Deep Tendon Reflexes: Reflexes are normal and symmetric.   Psychiatric:         Mood and Affect: Mood normal.         Behavior: Behavior normal.         Thought Content: Thought content normal.         Judgment: Judgment normal.         Assessment:       1. Essential hypertension    2. Hyperlipidemia, unspecified hyperlipidemia type    3. Pre-diabetes    4. Atherosclerosis of abdominal aorta    5. CKD (chronic kidney disease) stage 3, GFR 30-59 ml/min    6. Gastroesophageal reflux disease, esophagitis presence not specified    7. Colon cancer screening    8. Need for shingles vaccine    9. Influenza vaccine needed        Plan:       Essential hypertension  -     CBC auto differential; Future; Expected date: 09/22/2020  -     Comprehensive metabolic panel; Future; Expected date: 09/22/2020  -     Ambulatory referral/consult to Optometry; Future; Expected date: 09/29/2020  -     losartan (COZAAR) 100 MG tablet; Take 1 tablet (100 mg total) by mouth once daily.  Dispense: 90 tablet; Refill: 2  -     amLODIPine (NORVASC) 10 MG tablet; Take 1 tablet (10 mg total) by mouth once daily.  Dispense: 90 tablet; Refill: 2    Hyperlipidemia, unspecified hyperlipidemia  type  -     Lipid Panel; Future; Expected date: 09/22/2020  -     atorvastatin (LIPITOR) 10 MG tablet; Take 1 tablet (10 mg total) by mouth once daily.  Dispense: 90 tablet; Refill: 2    Pre-diabetes  -     Hemoglobin A1C; Future; Expected date: 09/22/2020    Atherosclerosis of abdominal aorta  -     atorvastatin (LIPITOR) 10 MG tablet; Take 1 tablet (10 mg total) by mouth once daily.  Dispense: 90 tablet; Refill: 2    CKD (chronic kidney disease) stage 3, GFR 30-59 ml/min  -     Protein / creatinine ratio, urine    Gastroesophageal reflux disease, esophagitis presence not specified  -     famotidine (PEPCID) 20 MG tablet; Take 1 tablet (20 mg total) by mouth 2 (two) times daily as needed for Heartburn.  Dispense: 60 tablet; Refill: 3    Colon cancer screening  -     Case request GI: COLONOSCOPY    Need for shingles vaccine - Shingrix #2 today.    Influenza vaccine needed - Flu shot today.             [FreeTextEntry1] : Referring: Dr. Gomez   Ms. Marroquin is a 79 year old woman with a history of HFpEF, nonobstructive CAD, HTN, HLD, and COPD who presents to the HF/cardiomyopathy clinic for further evaluation  ==============  For several years, has noted SOB both at rest and exercise which was attributed to pulmonary disease. Ischemic workup in 2013 via [facility] unrevealing and repeat in 2016 via [facility] consistent with mild non-obstructive CAD. More recently in August 2024, she developed LE swelling and noted to have very mild elevated in pro-BNP for which loop diuretics were initiated and Amlodipine dose reduced with subsequent improvement in both symptoms and normalized pro-BNP. She has not had HF hospitalizations.  ==============  She presents today for evaluation. She reports she started furosemide 20 mg daily about ~1 month ago, she has not taken this yet today. She has not noted a substantial improvement in her breathing since starting the furosemide but has noted increased urine output. She lives on [address] and previously walking 10-15 blocks one year ago. But at present feels there has been a decline. She reports she took the bus and the subway here today she managed the stairs without dyspnea and her primary limitation is arthralgias (carpel tunnel, unilateral), knee pain, hip pain, and shoulder pain. But can comfortably manage 5 blocks. Admits to occasional LH / dizziness not associated with positional changes. Her LE edema has resolved on furosemide and lower doses of amlodipine. Now wearing compression socks daily too.   She denies CP, SOB at rest, orthopnea, PND, abdominal discomfort, palpitations, and syncope. Her appetite is normal.  Denies early prandial fullness. No hx of joint replacement. Hx of unilateral carpel tunnel.

## 2025-01-30 ENCOUNTER — OFFICE VISIT (OUTPATIENT)
Dept: OTOLARYNGOLOGY | Facility: CLINIC | Age: 85
End: 2025-01-30
Payer: MEDICARE

## 2025-01-30 ENCOUNTER — CLINICAL SUPPORT (OUTPATIENT)
Dept: AUDIOLOGY | Facility: CLINIC | Age: 85
End: 2025-01-30
Payer: MEDICARE

## 2025-01-30 DIAGNOSIS — H90.3 SENSORINEURAL HEARING LOSS (SNHL) OF BOTH EARS: Primary | ICD-10-CM

## 2025-01-30 DIAGNOSIS — H90.3 SENSORINEURAL HEARING LOSS (SNHL) OF BOTH EARS: ICD-10-CM

## 2025-01-30 PROCEDURE — 92557 COMPREHENSIVE HEARING TEST: CPT | Mod: HCNC,S$GLB,, | Performed by: AUDIOLOGIST

## 2025-01-30 PROCEDURE — 99999 PR PBB SHADOW E&M-EST. PATIENT-LVL II: CPT | Mod: PBBFAC,HCNC,, | Performed by: NURSE PRACTITIONER

## 2025-01-30 PROCEDURE — 92567 TYMPANOMETRY: CPT | Mod: HCNC,S$GLB,, | Performed by: AUDIOLOGIST

## 2025-01-30 PROCEDURE — 99999 PR PBB SHADOW E&M-EST. PATIENT-LVL I: CPT | Mod: PBBFAC,HCNC,, | Performed by: AUDIOLOGIST

## 2025-01-30 NOTE — PROGRESS NOTES
Subjective:   Taran Hernandes is a 84 y.o. male who was referred to me by [facility] [employer] in consultation for hearing loss. He has a past medical history of BPH (benign prostatic hyperplasia), BPH with urinary obstruction (8/22/2016), DDD (degenerative disc disease), lumbar, Dyslipidemia, Elevated prostate specific antigen (PSA) (8/22/2016), Fatty liver, GERD (gastroesophageal reflux disease), Hematuria, History of adenomatous polyp of colon (7/17/2015), History of colonic polyps, Hypertension, Lumbar disc disease, Mild vitamin D deficiency, Renal disorder, and S/P TURP (3/9/2020). He reports at least 5 years of bilateral hearing loss. He struggles the most when on the phone, often having people email or text him instead. Has trouble making out speech. He in unsure if he has a better ear. He denies tinnitus, otalgia or otorrhea. There is not a family history of hearing loss at a young age. There is not a prior history of ear surgery. There is not a prior history of ear infections. There is not a history of ear trauma. He reports a history of significant noise exposure working as a  for an [employer] when he was younger.     Past Medical History  He has a past medical history of BPH (benign prostatic hyperplasia), BPH with urinary obstruction, DDD (degenerative disc disease), lumbar, Dyslipidemia, Elevated prostate specific antigen (PSA), Fatty liver, GERD (gastroesophageal reflux disease), Hematuria, History of adenomatous polyp of colon, History of colonic polyps, Hypertension, Lumbar disc disease, Mild vitamin D deficiency, Renal disorder, and S/P TURP.    Past Surgical History  He has a past surgical history that includes Cholecystectomy; Fracture surgery; Cataract extraction w/  intraocular lens implant (Left); Cataract extraction w/  intraocular lens implant (Right, 03/27/16); Neuroma surgery (Right, 2006); LASIK (Bilateral, 2016); Eye surgery; Transurethral resection of prostate (N/A, 3/3/2020);  Colonoscopy (N/A, 6/10/2021); and Colonoscopy (N/A, 8/25/2023).    Family History  His family history includes Cancer in his brother and mother; Diabetes in his father; Heart attack in his son; Heart disease in his father and son; Hypertension in his brother and father; Kidney disease in his son; No Known Problems in his daughter, son, son, and son.    Social History  He reports that he has never smoked. He has never used smokeless tobacco. He reports that he does not currently use alcohol after a past usage of about 2.0 standard drinks of alcohol per week. He reports that he does not use drugs.    Allergies  He is allergic to no known allergies.    Medications  He has a current medication list which includes the following prescription(s): acetaminophen, amlodipine, aspirin, atorvastatin, gabapentin, lanolin/mineral oil/petrolatum, losartan, omeprazole, [id number] [id number] (pf), and vitamin d.  Review of Systems     Constitutional: Negative for chills and fever.      HENT: Positive for hearing loss.  Negative for ear discharge, ear pain and ringing in the ears.      Neurological: Negative for dizziness.          Objective:     Constitutional:   He appears well-developed and well-nourished.     Head:  Normocephalic.     Ears:    Right Ear: No drainage. Tympanic membrane is not scarred. No middle ear effusion. Decreased hearing is noted.   Left Ear: No drainage. Tympanic membrane is not scarred.  No middle ear effusion. Decreased hearing is noted.     Pulmonary/Chest:   Effort normal.     Procedure  None    Imaging  I independently reviewed the MRI Brain dated 1/9/18.  Pertinent findings: Negative for acoustic neuroma.    Audiogram    I have independently reviewed the following audiogram and reviewed the report. Findings as follows:     Pure Tone Audiometry: Symmetric  Right - Normal (0-25 dB) to Profound (>90 dB) high frequency sensorineural hearing loss   Left - Normal (0-25 dB) to Severe (71-90 dB) sensorineural  hearing loss    Tympanometry  Right: Could not test  Left: Could not test    Word Discrimination Score  Right: 52%  Left 60%    Assessment:     1. Sensorineural hearing loss (SNHL) of both ears      Plan:     Sensorineural hearing loss (SNHL) of both ears  - Reviewed audiogram with patient.   - He is medically cleared for hearing amplification.. We discussed next steps for hearing aid evaluation. He would be a candidate for a cochlear implant eval in the future if hearing aids are no benefit.

## 2025-01-30 NOTE — PROGRESS NOTES
Audiologic Evaluation 1/30/2025:       Taran Hernandes, a 84 y.o. male, was seen today in the clinic for an audiologic evaluation for hearing loss.  Mr. Hernandes reported difficulty understanding conversational speech, when not looking at the person speaking. He denied tinnitus, otalgia, and dizziness. Mr. Hernandes reported a history of occupational noise exposure working as an  many years ago.      Tympanometry was attempted, but could not be measured due to failure to maintain hermetic seal. Audiogram results revealed normal hearing sloping to profound sensorineural hearing loss in the right ear and normal hearing sloping to severe sensorineural hearing loss in the left ear.  Speech reception thresholds were noted at 60 dB in the right ear and 55 dB in the left ear.  Speech discrimination scores were 52% in the right ear and 60% in the left ear.    Recommendations:  Otologic evaluation  Hearing aid consultation and/or cochlear implant evaluation  Annual audiogram  Hearing protection when in noise

## 2025-02-23 DIAGNOSIS — I70.0 ATHEROSCLEROSIS OF ABDOMINAL AORTA: ICD-10-CM

## 2025-02-23 DIAGNOSIS — E78.5 HYPERLIPIDEMIA, UNSPECIFIED HYPERLIPIDEMIA TYPE: ICD-10-CM

## 2025-02-24 RX ORDER — ATORVASTATIN CALCIUM 10 MG/1
10 TABLET, FILM COATED ORAL
Qty: 90 TABLET | Refills: 3 | Status: SHIPPED | OUTPATIENT
Start: 2025-02-24

## 2025-02-24 NOTE — TELEPHONE ENCOUNTER
Refill Routing Note   Medication(s) are not appropriate for processing by [facility] for the following reason(s):        Non-participating provider    [facility] action(s):  Route               Appointments  past 12m or future 3m with PCP    Date Provider   Last Visit   10/30/2024 Bruna Salinas DO   Next Visit   Visit date not found Bruna Salinas DO   ED visits in past 90 days: 0        Note composed:7:17 PM 02/23/2025

## 2025-04-23 ENCOUNTER — OFFICE VISIT (OUTPATIENT)
Dept: URGENT CARE | Facility: CLINIC | Age: 85
End: 2025-04-23
Payer: MEDICARE

## 2025-04-23 VITALS
RESPIRATION RATE: 18 BRPM | TEMPERATURE: 98 F | SYSTOLIC BLOOD PRESSURE: 164 MMHG | HEIGHT: 64 IN | OXYGEN SATURATION: 96 % | DIASTOLIC BLOOD PRESSURE: 85 MMHG | BODY MASS INDEX: 30.3 KG/M2 | WEIGHT: 177.5 LBS | HEART RATE: 57 BPM

## 2025-04-23 DIAGNOSIS — B96.89 BACTERIAL RESPIRATORY INFECTION: Primary | ICD-10-CM

## 2025-04-23 DIAGNOSIS — J98.8 BACTERIAL RESPIRATORY INFECTION: Primary | ICD-10-CM

## 2025-04-23 DIAGNOSIS — R05.1 ACUTE COUGH: ICD-10-CM

## 2025-04-23 LAB
CTP QC/QA: YES
CTP QC/QA: YES
POC MOLECULAR INFLUENZA A AGN: NEGATIVE
POC MOLECULAR INFLUENZA B AGN: NEGATIVE
SARS CORONAVIRUS 2 ANTIGEN: NEGATIVE

## 2025-04-23 PROCEDURE — 99203 OFFICE O/P NEW LOW 30 MIN: CPT | Mod: S$GLB,,, | Performed by: NURSE PRACTITIONER

## 2025-04-23 PROCEDURE — 87502 INFLUENZA DNA AMP PROBE: CPT | Mod: QW,S$GLB,, | Performed by: NURSE PRACTITIONER

## 2025-04-23 PROCEDURE — 87811 SARS-COV-2 COVID19 W/OPTIC: CPT | Mod: QW,S$GLB,, | Performed by: NURSE PRACTITIONER

## 2025-04-23 RX ORDER — BENZONATATE 200 MG/1
200 CAPSULE ORAL EVERY 8 HOURS PRN
Qty: 30 CAPSULE | Refills: 0 | Status: SHIPPED | OUTPATIENT
Start: 2025-04-23

## 2025-04-23 RX ORDER — PROMETHAZINE HYDROCHLORIDE AND DEXTROMETHORPHAN HYDROBROMIDE 6.25; 15 MG/5ML; MG/5ML
5 SYRUP ORAL NIGHTLY PRN
Qty: 118 ML | Refills: 0 | Status: SHIPPED | OUTPATIENT
Start: 2025-04-23

## 2025-04-23 RX ORDER — DOXYCYCLINE 100 MG/1
100 CAPSULE ORAL EVERY 12 HOURS
Qty: 14 CAPSULE | Refills: 0 | Status: SHIPPED | OUTPATIENT
Start: 2025-04-23 | End: 2025-04-30

## 2025-04-23 NOTE — PROGRESS NOTES
"Subjective:      Patient ID: Taran Hernandes is a 84 y.o. male.    Vitals:  height is 5' 4.02" (1.626 m) and weight is 80.5 kg (177 lb 7.5 oz). His oral temperature is 98.3 °F (36.8 °C). His blood pressure is 164/85 (abnormal) and his pulse is 57 (abnormal). His respiration is 18 and oxygen saturation is 96%.     Chief Complaint: Cough    This is a 84 y.o. male who presents today with a chief complaint of cough, chest congestion, and chest pain when coughing x1 week.  Home txt: aspirin, tylenol, dayquil     Cough  This is a new problem. The current episode started today. The problem has been unchanged. The cough is Non-productive. Associated symptoms include headaches, myalgias and wheezing. Pertinent negatives include no ear congestion, ear pain, fever or shortness of breath.       Constitution: Positive for fatigue. Negative for fever.   HENT:  Positive for congestion. Negative for ear pain.    Respiratory:  Positive for cough and wheezing. Negative for shortness of breath.    Gastrointestinal:  Negative for vomiting and diarrhea.   Musculoskeletal:  Positive for muscle ache.   Neurological:  Positive for headaches.      Objective:     Physical Exam   Constitutional: He is oriented to person, place, and time. He appears well-developed. He is cooperative.  Non-toxic appearance. He does not appear ill. No distress.   HENT:   Head: Normocephalic.   Ears:   Right Ear: Hearing, tympanic membrane, external ear and ear canal normal.   Left Ear: Hearing, tympanic membrane, external ear and ear canal normal.   Nose: Congestion present. No mucosal edema, rhinorrhea or nasal deformity. No epistaxis. Right sinus exhibits no maxillary sinus tenderness and no frontal sinus tenderness. Left sinus exhibits no maxillary sinus tenderness and no frontal sinus tenderness.   Mouth/Throat: Uvula is midline and mucous membranes are normal. Mucous membranes are moist. No trismus in the jaw. Normal dentition. No uvula swelling. " Posterior oropharyngeal erythema present. No oropharyngeal exudate or posterior oropharyngeal edema. Oropharynx is clear.   Eyes: Conjunctivae and lids are normal. No scleral icterus.   Neck: Trachea normal and phonation normal. Neck supple. No edema present. No erythema present. No neck rigidity present.   Cardiovascular: Normal rate and regular rhythm.   Pulmonary/Chest: Effort normal and breath sounds normal. No respiratory distress. He has no decreased breath sounds. He has no wheezes. He has no rhonchi.   Abdominal: Normal appearance.   Musculoskeletal: Normal range of motion.         General: No deformity. Normal range of motion.   Neurological: He is alert and oriented to person, place, and time. He exhibits normal muscle tone. Coordination normal.   Skin: Skin is warm, dry, intact, not diaphoretic and not pale.   Psychiatric: His speech is normal and behavior is normal. Judgment and thought content normal.   Nursing note and vitals reviewed.    Results for orders placed or performed in visit on 04/23/25   SARS Coronavirus 2 Antigen, POCT Manual Read    Collection Time: 04/23/25  4:07 PM   Result Value Ref Range    SARS Coronavirus 2 Antigen Negative Negative, Presumptive Negative     Acceptable Yes    POCT Influenza A/B MOLECULAR    Collection Time: 04/23/25  4:08 PM   Result Value Ref Range    POC Molecular Influenza A Ag Negative Negative    POC Molecular Influenza B Ag Negative Negative     Acceptable Yes         Assessment:     1. Bacterial respiratory infection    2. Acute cough        Plan:       Bacterial respiratory infection  -     POCT Influenza A/B MOLECULAR  -     SARS Coronavirus 2 Antigen, POCT Manual Read  -     doxycycline (VIBRAMYCIN) 100 MG Cap; Take 1 capsule (100 mg total) by mouth every 12 (twelve) hours. for 7 days  Dispense: 14 capsule; Refill: 0    Acute cough  -     POCT Influenza A/B MOLECULAR  -     SARS Coronavirus 2 Antigen, POCT Manual Read  -      benzonatate (TESSALON) 200 MG capsule; Take 1 capsule (200 mg total) by mouth every 8 (eight) hours as needed for Cough.  Dispense: 30 capsule; Refill: 0  -     promethazine-dextromethorphan (PROMETHAZINE-DM) 6.25-15 mg/5 mL Syrp; Take 5 mLs by mouth nightly as needed (cough).  Dispense: 118 mL; Refill: 0      Oral fluids  Hot tea with honey   Throat or cough lozenges  Ibuprofen or tylenol for any aches or fever   Rest  Steam from hot showers to help open upper airway  Blow nose often  Avoid circulating air (such as ceiling fans) dries your airway  Avoid drinking cold drinks (worsens cough)  Avoid strong smells which could worsen cough (perfume, lotions, smoke...)  You can also try a humidifier  Therapeutic coughing to expel mucous  Sit in upright position often  Follow up with any worsening symptoms; and seek ER care with any wheezing, retractions, or respiratory distress; lethargy; dehydration...

## 2025-08-07 ENCOUNTER — LAB VISIT (OUTPATIENT)
Dept: LAB | Facility: HOSPITAL | Age: 85
End: 2025-08-07
Payer: MEDICARE

## 2025-08-07 ENCOUNTER — OFFICE VISIT (OUTPATIENT)
Dept: INTERNAL MEDICINE | Facility: CLINIC | Age: 85
End: 2025-08-07
Payer: MEDICARE

## 2025-08-07 VITALS
SYSTOLIC BLOOD PRESSURE: 146 MMHG | HEART RATE: 63 BPM | BODY MASS INDEX: 31.42 KG/M2 | OXYGEN SATURATION: 96 % | HEIGHT: 64 IN | WEIGHT: 184.06 LBS | DIASTOLIC BLOOD PRESSURE: 70 MMHG

## 2025-08-07 DIAGNOSIS — R73.03 PREDIABETES: ICD-10-CM

## 2025-08-07 DIAGNOSIS — Z00.00 ADULT GENERAL MEDICAL EXAM: ICD-10-CM

## 2025-08-07 DIAGNOSIS — N40.1 BPH WITH URINARY OBSTRUCTION: Primary | ICD-10-CM

## 2025-08-07 DIAGNOSIS — R80.9 PROTEINURIA, UNSPECIFIED TYPE: ICD-10-CM

## 2025-08-07 DIAGNOSIS — N13.8 BPH WITH URINARY OBSTRUCTION: Primary | ICD-10-CM

## 2025-08-07 DIAGNOSIS — R10.11 RIGHT UPPER QUADRANT ABDOMINAL PAIN: ICD-10-CM

## 2025-08-07 DIAGNOSIS — R06.09 DYSPNEA ON EXERTION: ICD-10-CM

## 2025-08-07 LAB
ABSOLUTE EOSINOPHIL (OHS): 0.22 K/UL
ABSOLUTE MONOCYTE (OHS): 1.24 K/UL (ref 0.3–1)
ABSOLUTE NEUTROPHIL COUNT (OHS): 3.29 K/UL (ref 1.8–7.7)
ALBUMIN SERPL BCP-MCNC: 4.3 G/DL (ref 3.5–5.2)
ALP SERPL-CCNC: 74 UNIT/L (ref 40–150)
ALT SERPL W/O P-5'-P-CCNC: 49 UNIT/L (ref 0–55)
ANION GAP (OHS): 11 MMOL/L (ref 8–16)
AST SERPL-CCNC: 53 UNIT/L (ref 0–50)
BASOPHILS # BLD AUTO: 0.09 K/UL
BASOPHILS NFR BLD AUTO: 1 %
BILIRUB SERPL-MCNC: 0.5 MG/DL (ref 0.1–1)
BILIRUB SERPL-MCNC: ABNORMAL MG/DL
BLOOD URINE, POC: ABNORMAL
BUN SERPL-MCNC: 19 MG/DL (ref 8–23)
CALCIUM SERPL-MCNC: 9.7 MG/DL (ref 8.7–10.5)
CHLORIDE SERPL-SCNC: 107 MMOL/L (ref 95–110)
CO2 SERPL-SCNC: 24 MMOL/L (ref 23–29)
COLOR, POC UA: YELLOW
CREAT SERPL-MCNC: 1.4 MG/DL (ref 0.5–1.4)
EAG (OHS): 120 MG/DL (ref 68–131)
ERYTHROCYTE [DISTWIDTH] IN BLOOD BY AUTOMATED COUNT: 14.4 % (ref 11.5–14.5)
GFR SERPLBLD CREATININE-BSD FMLA CKD-EPI: 49 ML/MIN/1.73/M2
GLUCOSE SERPL-MCNC: 82 MG/DL (ref 70–110)
GLUCOSE UR QL STRIP: ABNORMAL
HBA1C MFR BLD: 5.8 % (ref 4–5.6)
HCT VFR BLD AUTO: 47.2 % (ref 40–54)
HGB BLD-MCNC: 14.8 GM/DL (ref 14–18)
IMM GRANULOCYTES # BLD AUTO: 0.03 K/UL (ref 0–0.04)
IMM GRANULOCYTES NFR BLD AUTO: 0.3 % (ref 0–0.5)
KETONES UR QL STRIP: ABNORMAL
LEUKOCYTE ESTERASE URINE, POC: ABNORMAL
LYMPHOCYTES # BLD AUTO: 3.83 K/UL (ref 1–4.8)
MCH RBC QN AUTO: 28.2 PG (ref 27–31)
MCHC RBC AUTO-ENTMCNC: 31.4 G/DL (ref 32–36)
MCV RBC AUTO: 90 FL (ref 82–98)
NITRITE, POC UA: ABNORMAL
NUCLEATED RBC (/100WBC) (OHS): 0 /100 WBC
PH, POC UA: 6
PLATELET # BLD AUTO: 205 K/UL (ref 150–450)
PMV BLD AUTO: 10.9 FL (ref 9.2–12.9)
POTASSIUM SERPL-SCNC: 5.3 MMOL/L (ref 3.5–5.1)
PROT SERPL-MCNC: 8.2 GM/DL (ref 6–8.4)
PROTEIN, POC: ABNORMAL
RBC # BLD AUTO: 5.24 M/UL (ref 4.6–6.2)
RELATIVE EOSINOPHIL (OHS): 2.5 %
RELATIVE LYMPHOCYTE (OHS): 44 % (ref 18–48)
RELATIVE MONOCYTE (OHS): 14.3 % (ref 4–15)
RELATIVE NEUTROPHIL (OHS): 37.9 % (ref 38–73)
SODIUM SERPL-SCNC: 142 MMOL/L (ref 136–145)
SPECIFIC GRAVITY, POC UA: 1.03
UROBILINOGEN, POC UA: 1
WBC # BLD AUTO: 8.7 K/UL (ref 3.9–12.7)

## 2025-08-07 PROCEDURE — 99999 PR PBB SHADOW E&M-EST. PATIENT-LVL III: CPT | Mod: PBBFAC,GC,,

## 2025-08-07 PROCEDURE — 83036 HEMOGLOBIN GLYCOSYLATED A1C: CPT

## 2025-08-07 PROCEDURE — 36415 COLL VENOUS BLD VENIPUNCTURE: CPT

## 2025-08-07 PROCEDURE — 85025 COMPLETE CBC W/AUTO DIFF WBC: CPT

## 2025-08-07 PROCEDURE — 84155 ASSAY OF PROTEIN SERUM: CPT

## 2025-08-07 RX ORDER — TAMSULOSIN HYDROCHLORIDE 0.4 MG/1
0.4 CAPSULE ORAL DAILY
Qty: 30 CAPSULE | Refills: 11 | Status: SHIPPED | OUTPATIENT
Start: 2025-08-07 | End: 2026-08-07

## 2025-08-07 NOTE — PATIENT INSTRUCTIONS
Tamulosin started for increasing urination at night. Please stop medication if this causes dizziness. Continue to check blood pressure at home and notify me if your systolic blood pressure is >135.

## 2025-08-07 NOTE — PROGRESS NOTES
Taran Hernandes  1940        Subjective     Reason for Visit: annual    History of Present Illness:  Mr. Taran Hernandes is a 84 y.o. male with a history of HTN, HLD, prediabetes, CKD stage 3, hepatic steatosis, lumbar DJD, hearing impairment who presents to clinic for annual exam. He complains of urinating every hour when he sleeps. He complains of ROMERO although he works out regularly. He doesn't get SOB when he works out at the gym. He takes his BP meds regularly and his SBP usually runs in the 130s at home. He complains of dull, intermittent RUQ pain that is associated with eating although he has had a cholecystectomy.             ROS     PAST HISTORY:     Past Medical History:   Diagnosis Date    BPH (benign prostatic hyperplasia)     BPH with urinary obstruction 8/22/2016    DDD (degenerative disc disease), lumbar     Dyslipidemia     Elevated prostate specific antigen (PSA) 8/22/2016    Fatty liver     GERD (gastroesophageal reflux disease)     Hematuria     History of adenomatous polyp of colon 7/17/2015    History of colonic polyps     Hypertension     Lumbar disc disease     Mild vitamin D deficiency     Renal disorder     S/P TURP 3/9/2020       Past Surgical History:   Procedure Laterality Date    CATARACT EXTRACTION W/  INTRAOCULAR LENS IMPLANT Left     Dr Ruggiero     CATARACT EXTRACTION W/  INTRAOCULAR LENS IMPLANT Right 03/27/16    Dr Ruggiero    CHOLECYSTECTOMY      COLONOSCOPY N/A 6/10/2021    Procedure: COLONOSCOPY;  Surgeon: Moody Morton MD;  Location: [facility] ([address] [place] [address]);  Service: Endoscopy;  Laterality: N/A;  prep ins. emailed -Pt. fully vaccinated.Feb.2021 [facility]    COLONOSCOPY N/A 8/25/2023    Procedure: COLONOSCOPY;  Surgeon: Moody Morton MD;  Location: [facility] ([address] [place] [address]);  Service: Endoscopy;  Laterality: N/A;  Referral: KAITLYN CASTRO  [place] states will most likely not answer phone since he can't hear, uses [facility]  PEG   Inst portal and handed to pt    EYE SURGERY       FRACTURE SURGERY      left ankle    LASIK Bilateral 2016    NEUROMA SURGERY Right 2006    TRANSURETHRAL RESECTION OF PROSTATE N/A 3/3/2020    Procedure: TURP (TRANSURETHRAL RESECTION OF PROSTATE);  Surgeon: Vincent King MD;  Location: [facility] OR [address] [place] [address];  Service: Urology;  Laterality: N/A;  2hr       Family History   Problem Relation Name Age of Onset    Cancer Mother          complications of c-scope/ suspect colon cancer    Hypertension Father      Diabetes Father      Heart disease Father      Cancer Brother          liver/ etoh    Hypertension Brother      No Known Problems Daughter Taylor     Heart attack Son Al Jr     Heart disease Son Al Jr     Kidney disease Son Al Jr         kidney removed for tumor    No Known Problems Son Sumanth     No Known Problems Son Benito     No Known Problems Son Vincent     Melanoma Neg Hx      Blindness Neg Hx      Glaucoma Neg Hx      Macular degeneration Neg Hx      Retinal detachment Neg Hx      Anesthesia problems Neg Hx         Social History     Socioeconomic History    Marital status:    Occupational History    Occupation:     Tobacco Use    Smoking status: Never    Smokeless tobacco: Never   Substance and Sexual Activity    Alcohol use: Not Currently     Alcohol/week: 2.0 standard drinks of alcohol     Types: 2 Cans of beer per week     Comment: beer once a month    Drug use: No    Sexual activity: Yes     Partners: Female     Social Drivers of Health     Financial Resource Strain: Low Risk  (8/26/2023)    Overall Financial Resource Strain (CARDIA)     Difficulty of Paying Living Expenses: Not hard at all   Food Insecurity: No Food Insecurity (8/26/2023)    Hunger Vital Sign     Worried About Running Out of Food in the Last Year: Never true     Ran Out of Food in the Last Year: Never true   Transportation Needs: No Transportation Needs (8/26/2023)    PRAPARE - Transportation     Lack of Transportation (Medical): No     Lack of Transportation  (Non-Medical): No   Physical Activity: Sufficiently Active (8/26/2023)    Exercise Vital Sign     Days of Exercise per Week: 3 days     Minutes of Exercise per Session: 120 min   Stress: No Stress Concern Present (8/26/2023)    [employer] - Occupational Stress Questionnaire     Feeling of Stress : Not at all   Housing Stability: Low Risk  (8/26/2023)    Housing Stability Vital Sign     Unable to Pay for Housing in the Last Year: No     Number of Places Lived in the Last Year: 1     Unstable Housing in the Last Year: No       MEDICATIONS & ALLERGIES:     Current Outpatient Medications on File Prior to Visit   Medication Sig    acetaminophen (TYLENOL) 500 MG tablet Take 1,000 mg by mouth daily as needed for Pain.    amLODIPine (NORVASC) 10 MG tablet TAKE 1 TABLET ONE TIME DAILY    aspirin (ECOTRIN) 81 MG EC tablet Take 81 mg by mouth once daily.     atorvastatin (LIPITOR) 10 MG tablet TAKE 1 TABLET ONE TIME DAILY    benzonatate (TESSALON) 200 MG capsule Take 1 capsule (200 mg total) by mouth every 8 (eight) hours as needed for Cough.    gabapentin (NEURONTIN) 300 MG capsule Take 1 capsule (300 mg total) by mouth 2 (two) times daily. (Patient not taking: Reported on 4/23/2025)    lanolin/mineral oil/petrolatum (ARTIFICIAL TEARS OPHT) Place 2 drops into both eyes daily as needed (Dry eye). (Patient not taking: Reported on 4/23/2025)    losartan (COZAAR) 100 MG tablet TAKE 1 TABLET ONE TIME DAILY    omeprazole (PRILOSEC) 20 MG capsule Take 20 mg by mouth once daily.    promethazine-dextromethorphan (PROMETHAZINE-DM) 6.25-15 mg/5 mL Syrp Take 5 mLs by mouth nightly as needed (cough).    RSVPreF3 antigen-AS01E, PF, (AREXVY) 120 mcg/0.5 mL SusR vaccine Inject 0.5mL into the muscle.    vitamin D (VITAMIN D3) 1000 units Tab Take 1,000 Units by mouth once daily.     No current facility-administered medications on file prior to visit.       Review of patient's allergies indicates:   Allergen  "Reactions    No known allergies        OBJECTIVE:        Vital Signs:  Vitals:    08/07/25 1417   BP: (!) 146/70   BP Location: Left arm   Patient Position: Sitting   Pulse: 63   SpO2: 96%   Weight: 83.5 kg (184 lb 1.4 oz)   Height: 5' 4.02" (1.626 m)       Body mass index is 31.58 kg/m².     Physical Exam:  Physical Exam  Constitutional:       General: He is not in acute distress.  HENT:      Head: Normocephalic and atraumatic.   Eyes:      Extraocular Movements: Extraocular movements intact.   Cardiovascular:      Rate and Rhythm: Normal rate and regular rhythm.      Heart sounds: No murmur heard.  Pulmonary:      Effort: Pulmonary effort is normal. No respiratory distress.      Breath sounds: Normal breath sounds.   Abdominal:      General: There is no distension.      Tenderness: There is abdominal tenderness. There is no guarding or rebound.      Comments: Mild TTP on RUQ     Musculoskeletal:      Right lower leg: No edema.      Left lower leg: No edema.   Neurological:      Mental Status: He is alert. Mental status is at baseline.   Psychiatric:         Mood and Affect: Mood normal.         Behavior: Behavior normal.            Laboratory  Lab Results   Component Value Date    WBC 5.86 03/01/2024    HGB 14.5 03/01/2024    HCT 45.5 03/01/2024    MCV 87 03/01/2024     03/01/2024     Lab Results   Component Value Date    GLU 98 11/02/2024     11/02/2024    K 3.9 11/02/2024     11/02/2024    CO2 20 (L) 11/02/2024    BUN 13 11/02/2024    CREATININE 1.2 11/02/2024    CALCIUM 8.9 11/02/2024    MG 1.8 08/26/2023    PROT 7.5 11/02/2024    BILITOT 0.7 11/02/2024    ALKPHOS 73 11/02/2024    ALT 37 11/02/2024    AST 46 (H) 11/02/2024     Lab Results   Component Value Date    INR 1.0 02/21/2020    INR 1.1 02/06/2010    INR 1.0 05/23/2009     Lab Results   Component Value Date    CHOL 121 03/01/2024    HDL 37 (L) 03/01/2024    LDLCALC 68.4 03/01/2024    TRIG 78 03/01/2024     Lab Results   Component Value " "Date    HGBA1C 5.8 (H) 11/02/2024     Lab Results   Component Value Date    TSH 2.796 08/25/2023     No results for input(s): "POCTGLUCOSE" in the last 72 hours.       Health Maintenance         Date Due Completion Date    COVID-19 Vaccine (5 - 2024-25 season) 09/01/2024 2/26/2024    Colonoscopy 08/25/2025 8/25/2023    Influenza Vaccine (1) 09/01/2025 12/3/2024    Hemoglobin A1c (Prediabetes) 11/02/2025 11/2/2024    Lipid Panel 03/01/2029 3/1/2024    TETANUS VACCINE 03/27/2029 3/27/2019                ASSESSMENT & PLAN:       Mr. Taran Hernandes is a 84 y.o. male who was seen today in clinic for annual. TTE to evaluate for worsening HF. Abdominal US to evaluate for choledoco. Other ddx includes duodenal ulcer vs PVT. He has not scheduled an appointment with nephrology.      Taran Du" was seen today for annual exam and back pain.    Diagnoses and all orders for this visit:    BPH with urinary obstruction  -     tamsulosin (FLOMAX) 0.4 mg Cap; Take 1 capsule (0.4 mg total) by mouth once daily.    Right upper quadrant abdominal pain  -     US Abdomen Limited; Future    Dyspnea on exertion  -     Echo; Future    Prediabetes  -     Hemoglobin A1C; Future    Adult general medical exam  -     CBC Auto Differential; Future  -     Comprehensive Metabolic Panel; Future    Proteinuria, unspecified type  -     POCT URINE DIPSTICK WITH MICROSCOPE, AUTOMATED         1. BPH with urinary obstruction    2. Right upper quadrant abdominal pain    3. Dyspnea on exertion    4. Prediabetes    5. Adult general medical exam    6. Proteinuria, unspecified type        Follow up in about 1 year (around 8/7/2026).    Other Orders Placed This Visit:  Orders Placed This Encounter   Procedures    US Abdomen Limited    CBC Auto Differential    Comprehensive Metabolic Panel    Hemoglobin A1C    POCT URINE DIPSTICK WITH MICROSCOPE, AUTOMATED    Echo               Discussed with Dr. Cowan - staff attestation to follow    Bruna Salinas, " DO  Internal Medicine, PGY-3  08/07/2025.2:23 PM  [facility]  Internal Medicine Resident Clinic  [address] [place] [address]  [phone]  [email or web address]

## 2025-08-11 ENCOUNTER — HOSPITAL ENCOUNTER (OUTPATIENT)
Dept: CARDIOLOGY | Facility: HOSPITAL | Age: 85
Discharge: HOME | End: 2025-08-11
Payer: MEDICARE

## 2025-08-11 ENCOUNTER — RESULTS FOLLOW-UP (OUTPATIENT)
Dept: HEPATOLOGY | Facility: HOSPITAL | Age: 85
End: 2025-08-11
Payer: MEDICARE

## 2025-08-11 VITALS
SYSTOLIC BLOOD PRESSURE: 130 MMHG | BODY MASS INDEX: 31.41 KG/M2 | DIASTOLIC BLOOD PRESSURE: 84 MMHG | HEART RATE: 57 BPM | HEIGHT: 64 IN | WEIGHT: 184 LBS

## 2025-08-11 DIAGNOSIS — R06.09 DYSPNEA ON EXERTION: ICD-10-CM

## 2025-08-11 DIAGNOSIS — E87.5 HYPERKALEMIA: Primary | ICD-10-CM

## 2025-08-11 LAB
AORTIC SIZE INDEX (SOV): 1.6 CM/M2
AORTIC SIZE INDEX: 2.1 CM/M2
ASCENDING AORTA: 4 CM
AV AREA BY CONTINUOUS VTI: 2.5 CM2
AV INDEX (PROSTH): 0.76
AV LVOT MEAN GRADIENT: 2 MMHG
AV LVOT PEAK GRADIENT: 5 MMHG
AV MEAN GRADIENT: 6 MMHG
AV PEAK GRADIENT: 9 MMHG
AV VALVE AREA BY VELOCITY RATIO: 2.5 CM²
AV VALVE AREA: 2.6 CM2
AV VELOCITY RATIO: 0.73
BSA FOR ECHO PROCEDURE: 1.94 M2
CV ECHO LV RWT: 0.45 CM
DOP CALC AO PEAK VEL: 1.5 M/S
DOP CALC AO VTI: 33.6 CM
DOP CALC LVOT AREA: 3.5 CM2
DOP CALC LVOT DIAMETER: 2.1 CM
DOP CALC LVOT PEAK VEL: 1.1 M/S
DOP CALCLVOT PEAK VEL VTI: 25.7 CM
E WAVE DECELERATION TIME: 218 MS
E/A RATIO: 0.7
E/E' RATIO: 10 M/S
ECHO EF ESTIMATED: 44 %
ECHO LV POSTERIOR WALL: 1 CM (ref 0.6–1.1)
EJECTION FRACTION: 55 %
FRACTIONAL SHORTENING: 22.7 % (ref 28–44)
INTERVENTRICULAR SEPTUM: 1 CM (ref 0.6–1.1)
LA MAJOR: 5.2 CM
LA MINOR: 4.5 CM
LA WIDTH: 3.9 CM
LEFT ATRIUM SIZE: 3.7 CM
LEFT ATRIUM VOLUME INDEX MOD: 28 ML/M2
LEFT ATRIUM VOLUME INDEX: 31 ML/M2
LEFT ATRIUM VOLUME MOD: 53 ML
LEFT ATRIUM VOLUME: 59 CM3
LEFT INTERNAL DIMENSION IN SYSTOLE: 3.4 CM (ref 2.1–4)
LEFT VENTRICLE DIASTOLIC VOLUME INDEX: 46.03 ML/M2
LEFT VENTRICLE DIASTOLIC VOLUME: 87 ML
LEFT VENTRICLE MASS INDEX: 78.2 G/M2
LEFT VENTRICLE SYSTOLIC VOLUME INDEX: 25.9 ML/M2
LEFT VENTRICLE SYSTOLIC VOLUME: 49 ML
LEFT VENTRICULAR INTERNAL DIMENSION IN DIASTOLE: 4.4 CM (ref 3.5–6)
LEFT VENTRICULAR MASS: 147.8 G
LV LATERAL E/E' RATIO: 8.1
LV SEPTAL E/E' RATIO: 12.2
Lab: 1.9 CM/M
Lab: 2.5 CM/M
MV PEAK A VEL: 1.05 M/S
MV PEAK E VEL: 0.73 M/S
OHS CV CPX PATIENT HEIGHT IN: 64
OHS CV RV/LV RATIO: 0.98 CM
PISA TR MAX VEL: 2.5 M/S
RA MAJOR: 5.14 CM
RA PRESSURE ESTIMATED: 8 MMHG
RA WIDTH: 3.58 CM
RIGHT ATRIAL AREA: 18 CM2
RIGHT VENTRICLE DIASTOLIC BASEL DIMENSION: 4.3 CM
RV TB RVSP: 11 MMHG
RV TISSUE DOPPLER FREE WALL SYSTOLIC VELOCITY 1 (APICAL 4 CHAMBER VIEW): 11.99 CM/S
SINUS: 3.1 CM
STJ: 2.6 CM
TDI LATERAL: 0.09 M/S
TDI SEPTAL: 0.06 M/S
TDI: 0.08 M/S
TRICUSPID ANNULAR PLANE SYSTOLIC EXCURSION: 2.6 CM
TV PEAK GRADIENT: 25 MMHG
TV REST PULMONARY ARTERY PRESSURE: 33 MMHG
Z-SCORE OF LEFT VENTRICULAR DIMENSION IN END DIASTOLE: -1.84
Z-SCORE OF LEFT VENTRICULAR DIMENSION IN END SYSTOLE: 0.33

## 2025-08-11 PROCEDURE — 93306 TTE W/DOPPLER COMPLETE: CPT | Mod: 26,,, | Performed by: INTERNAL MEDICINE

## 2025-08-11 PROCEDURE — 93306 TTE W/DOPPLER COMPLETE: CPT

## 2025-08-12 ENCOUNTER — PATIENT MESSAGE (OUTPATIENT)
Dept: INTERNAL MEDICINE | Facility: CLINIC | Age: 85
End: 2025-08-12
Payer: MEDICARE

## 2025-08-16 ENCOUNTER — HOSPITAL ENCOUNTER (OUTPATIENT)
Dept: RADIOLOGY | Facility: HOSPITAL | Age: 85
Discharge: HOME | End: 2025-08-16
Payer: MEDICARE

## 2025-08-16 DIAGNOSIS — R10.11 RIGHT UPPER QUADRANT ABDOMINAL PAIN: ICD-10-CM

## 2025-08-16 PROCEDURE — 76705 ECHO EXAM OF ABDOMEN: CPT | Mod: TC,HCNC

## 2025-08-16 PROCEDURE — 76705 ECHO EXAM OF ABDOMEN: CPT | Mod: 26,HCNC,, | Performed by: RADIOLOGY

## 2025-08-18 ENCOUNTER — PATIENT MESSAGE (OUTPATIENT)
Dept: INTERNAL MEDICINE | Facility: CLINIC | Age: 85
End: 2025-08-18
Payer: MEDICARE

## 2025-08-27 ENCOUNTER — TELEPHONE (OUTPATIENT)
Dept: ENDOSCOPY | Facility: HOSPITAL | Age: 85
End: 2025-08-27

## 2025-08-27 ENCOUNTER — CLINICAL SUPPORT (OUTPATIENT)
Dept: ENDOSCOPY | Facility: HOSPITAL | Age: 85
End: 2025-08-27
Payer: MEDICARE

## 2025-08-27 DIAGNOSIS — Z12.12 SCREENING FOR COLORECTAL CANCER: Primary | ICD-10-CM

## 2025-08-27 DIAGNOSIS — Z12.11 SCREENING FOR COLORECTAL CANCER: Primary | ICD-10-CM

## (undated) DEVICE — SET SINGLE BASIN

## (undated) DEVICE — URINARY DRAINAGE BAG

## (undated) DEVICE — SOL IRR NACL .9% 1000CC

## (undated) DEVICE — PACK CYSTO

## (undated) DEVICE — SET Y-TYPE TUR IRRIGATION

## (undated) DEVICE — GOWN SMARTGOWN LVL4 X-LONG XL

## (undated) DEVICE — LOOP CUT RESECTSCPE 30 DG 26F

## (undated) DEVICE — SYR 50ML CATH TIP

## (undated) DEVICE — UNDERGLOVE BIOGEL PI SZ 6.5 LF

## (undated) DEVICE — TRAY CYSTO BASIN

## (undated) DEVICE — SOL IRR NACL .9% 3000ML

## (undated) DEVICE — SYR 10CC LUER LOCK